# Patient Record
Sex: MALE | Race: BLACK OR AFRICAN AMERICAN | Employment: FULL TIME | URBAN - METROPOLITAN AREA
[De-identification: names, ages, dates, MRNs, and addresses within clinical notes are randomized per-mention and may not be internally consistent; named-entity substitution may affect disease eponyms.]

---

## 2017-01-04 ENCOUNTER — OFFICE VISIT (OUTPATIENT)
Dept: INTERNAL MEDICINE CLINIC | Age: 37
End: 2017-01-04

## 2017-01-04 VITALS
BODY MASS INDEX: 45.67 KG/M2 | DIASTOLIC BLOOD PRESSURE: 64 MMHG | SYSTOLIC BLOOD PRESSURE: 122 MMHG | HEIGHT: 67 IN | WEIGHT: 291 LBS | HEART RATE: 84 BPM | OXYGEN SATURATION: 96 % | RESPIRATION RATE: 20 BRPM

## 2017-01-04 DIAGNOSIS — G89.29 CHRONIC NONINTRACTABLE HEADACHE, UNSPECIFIED HEADACHE TYPE: Primary | ICD-10-CM

## 2017-01-04 DIAGNOSIS — R51.9 CHRONIC NONINTRACTABLE HEADACHE, UNSPECIFIED HEADACHE TYPE: Primary | ICD-10-CM

## 2017-01-04 DIAGNOSIS — E66.01 MORBID OBESITY DUE TO EXCESS CALORIES (HCC): ICD-10-CM

## 2017-01-04 DIAGNOSIS — E78.2 MIXED HYPERLIPIDEMIA: ICD-10-CM

## 2017-01-04 RX ORDER — TOPIRAMATE 50 MG/1
50 TABLET, FILM COATED ORAL 2 TIMES DAILY
Qty: 60 TAB | Refills: 0 | Status: SHIPPED | OUTPATIENT
Start: 2017-01-04 | End: 2017-02-03

## 2017-01-04 RX ORDER — PHENTERMINE HYDROCHLORIDE 37.5 MG/1
37.5 TABLET ORAL
Qty: 30 TAB | Refills: 0 | Status: SHIPPED | OUTPATIENT
Start: 2017-01-04 | End: 2017-02-03

## 2017-01-04 NOTE — PROGRESS NOTES
Chief Complaint   Patient presents with    Medication Refill     was started on phentermine for 30 days in sept and didn't know had to come in for refill. 1. Have you been to the ER, urgent care clinic since your last visit? Hospitalized since your last visit? No    2. Have you seen or consulted any other health care providers outside of the [address] [place] [address] since your last visit? Include any pap smears or colon screening.  No

## 2017-01-04 NOTE — MR AVS SNAPSHOT
Visit Information Date & Time Provider Department Dept. Phone Encounter #  
 1/4/2017 10:45 AM Cedric Elizabeth MD [facility] Medicine [phone] [id number] Upcoming Health Maintenance Date Due INFLUENZA AGE 9 TO ADULT 8/1/2016 DTaP/Tdap/Td series (3 - Td) 4/12/2021 Allergies as of 1/4/2017  Review Complete On: 1/4/2017 By: Cedric Elizabeth MD  
  
 Severity Noted Reaction Type Reactions Sulfa (Sulfonamide Antibiotics)  11/11/2009    Nausea and Vomiting Current Immunizations  Reviewed on 4/12/2011 Name Date  
 TD Vaccine 1/1/1998 TDAP Vaccine 4/12/2011 Not reviewed this visit You Were Diagnosed With   
  
 Codes Comments Chronic nonintractable headache, unspecified headache type    -  Primary ICD-10-CM: R51 ICD-9-CM: 784.0 Morbid obesity due to excess calories (HCC)     ICD-10-CM: E66.01 
ICD-9-CM: 278.01 Mixed hyperlipidemia     ICD-10-CM: E78.2 ICD-9-CM: 272.2 Vitals BP Pulse Resp Height(growth percentile) Weight(growth percentile) SpO2  
 122/64 84 20 5' 7\" (1.702 m) 291 lb (132 kg) 96% BMI Smoking Status 45.58 kg/m2 Never Smoker BMI and BSA Data Body Mass Index Body Surface Area 45.58 kg/m 2 2.5 m 2 Preferred Pharmacy Pharmacy Name Phone [address], [address] Your Updated Medication List  
  
   
This list is accurate as of: 1/4/17 11:46 AM.  Always use your most recent med list.  
  
  
  
  
 phentermine 37.5 mg tablet Commonly known as:  ADIPEX-P Take 1 Tab by mouth every morning for 30 days. Max Daily Amount: 37.5 mg.  
  
 topiramate 50 mg tablet Commonly known as:  TOPAMAX Take 1 Tab by mouth two (2) times a day for 30 days. Prescriptions Printed  Refills  
 phentermine (ADIPEX-P) 37.5 mg tablet 0  
 Sig: Take 1 Tab by mouth every morning for 30 days. Max Daily Amount: 37.5 mg.  
 Class: Print Route: Oral  
  
Prescriptions Sent to Pharmacy Refills  
 topiramate (TOPAMAX) 50 mg tablet 0 Sig: Take 1 Tab by mouth two (2) times a day for 30 days. Class: Normal  
 Pharmacy: [address] [facility], [address]  #: [phone] Route: Oral  
  
Introducing [facility]! Dear William Phillips: Thank you for requesting a [employer] account. Our records indicate that you already have an active [employer] account. You can access your account anytime at [email or web address]. [employer] Did you know that you can access your hospital and ER discharge instructions at any time in [employer]? You can also review all of your test results from your hospital stay or ER visit. Additional Information If you have questions, please visit the Frequently Asked Questions section of the [employer] website at [email or web address] Remember, [employer] is NOT to be used for urgent needs. For medical emergencies, dial 911. Now available from your iPhone and Android! Please provide this summary of care documentation to your next provider. Your primary care clinician is listed as Alonso Marmolejo. If you have any questions after today's visit, please call [phone].

## 2017-01-04 NOTE — PROGRESS NOTES
Written by Eugene Pardo, as dictated by Dr. Yuniel Helms MD.    Flaco Yoon is a 39 y.o. male. HPI  The patient comes in today for a phentermine follow up. He did not come back for a follow up after he finished the first month of phentermine in September. He lost down to 277 lbs and now he has gained the weight back and weighs 291 lbs. He would like to go back on phentermine. He was supposed to go in for a colonoscopy in September, but he lost the paperwork, so he requests more paperwork. He does not exercise and he is very stressed at work and has long hours. His headaches are better, at this time after we put him on Topamax. His flank pain has improved since his last visit. He is currently on Doxycycline for acne. He denies any abdominal pain or irregular BMs. Patient Active Problem List   Diagnosis Code    Hyperlipemia E78.5    Acne vulgaris L70.0    Depression F32.9    Chronic nonintractable headache R51        No current outpatient prescriptions on file prior to visit. No current facility-administered medications on file prior to visit.         Allergies   Allergen Reactions    Sulfa (Sulfonamide Antibiotics) Nausea and Vomiting       Past Medical History   Diagnosis Date    Mixed hyperlipidemia     Obesity     Persistent disorder of initiating or maintaining sleep        Past Surgical History   Procedure Laterality Date    Hx orthopaedic  '05     rt. 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 HENT: Negative for congestion. Respiratory: Negative for cough and wheezing. Cardiovascular: Negative for chest pain and palpitations. Gastrointestinal: Negative for abdominal pain and heartburn. Musculoskeletal: Negative for joint pain and myalgias. Neurological: Positive for headaches. Negative for weakness. Visit Vitals    /64    Pulse 84    Resp 20    Ht 5' 7\" (1.702 m)    Wt 291 lb (132 kg)    SpO2 96%    BMI 45.58 kg/m2     Physical Exam   Constitutional: He is oriented to person, place, and time. No distress. Obese   HENT:   Right Ear: External ear normal.   Left Ear: External ear normal.   Mouth/Throat: Oropharynx is clear and moist.   Eyes: Conjunctivae and EOM are normal. Right eye exhibits no discharge. Left eye exhibits no discharge. Neck: Normal range of motion. Neck supple. Cardiovascular: Normal rate and regular rhythm. Pulmonary/Chest: Effort normal and breath sounds normal. He has no wheezes. Abdominal: Soft. Bowel sounds are normal. He exhibits no distension. Lymphadenopathy:     He has no cervical adenopathy. Neurological: He is alert and oriented to person, place, and time. Psychiatric: He has a normal mood and affect. Nursing note and vitals reviewed. ASSESSMENT and PLAN    ICD-10-CM ICD-9-CM    1. Chronic nonintractable headache, unspecified headache type R51 784.0 topiramate (TOPAMAX) 50 mg tablet sent to pharmacy. I will send him a refill of his Topamax in order to help prevent his headaches. 2. Morbid obesity due to excess calories ([facility]) E66.01 278.01 phentermine (ADIPEX-P) 37.5 mg tablet script given to patient. I will put her on Phentermine for 1 month since he has already had an EKG on this medication.  I discussed that he must come in at the end of the month in order to gauge how his weight loss is working. I discussed he can only take this medication for 3 months. 3. Mixed hyperlipidemia E78.2 272.2 He is not on any oral statins at this time. Trying to loose weight & bring levels down with diet. This plan was reviewed with the patient and patient agrees. All questions were answered. This scribe documentation was reviewed by me and accurately reflects the examination and decisions made by me. This note will not be viewable in [address]

## 2017-04-27 ENCOUNTER — PATIENT OUTREACH (OUTPATIENT)
Dept: INTERNAL MEDICINE CLINIC | Age: 37
End: 2017-04-27

## 2017-07-20 ENCOUNTER — HOSPITAL ENCOUNTER (EMERGENCY)
Age: 37
Discharge: HOME | End: 2017-07-20
Attending: FAMILY MEDICINE

## 2017-07-20 ENCOUNTER — APPOINTMENT (OUTPATIENT)
Dept: GENERAL RADIOLOGY | Age: 37
End: 2017-07-20
Attending: FAMILY MEDICINE

## 2017-07-20 VITALS
HEIGHT: 67 IN | OXYGEN SATURATION: 95 % | SYSTOLIC BLOOD PRESSURE: 136 MMHG | WEIGHT: 291 LBS | HEART RATE: 82 BPM | RESPIRATION RATE: 16 BRPM | DIASTOLIC BLOOD PRESSURE: 86 MMHG | TEMPERATURE: 97.8 F | BODY MASS INDEX: 45.67 KG/M2

## 2017-07-20 DIAGNOSIS — S93.601A FOOT SPRAIN, RIGHT, INITIAL ENCOUNTER: Primary | ICD-10-CM

## 2017-07-20 RX ORDER — HYDROCODONE BITARTRATE AND ACETAMINOPHEN 7.5; 325 MG/1; MG/1
1 TABLET ORAL
Qty: 15 TAB | Refills: 0 | Status: SHIPPED | OUTPATIENT
Start: 2017-07-20 | End: 2017-10-27 | Stop reason: ALTCHOICE

## 2017-07-20 RX ORDER — IBUPROFEN 800 MG/1
800 TABLET ORAL
Qty: 20 TAB | Refills: 0 | Status: SHIPPED | OUTPATIENT
Start: 2017-07-20 | End: 2017-07-27

## 2017-07-20 NOTE — UC PROVIDER NOTE
Patient is a 40 y.o. male presenting with foot pain. The history is provided by the patient. Foot Pain    This is a new problem. The current episode started 3 to 5 hours ago. The problem occurs constantly. The problem has been gradually worsening. The pain is present in the right foot and right ankle. The quality of the pain is described as constant. The pain is at a severity of 9/10. The pain is severe. Associated symptoms include limited range of motion and stiffness. The symptoms are aggravated by standing, movement, palpation and contact. He has tried nothing for the symptoms. There has been a history of trauma (twisted ankle walking straighht this am ). Past Medical History:   Diagnosis Date    Mixed hyperlipidemia     Obesity     Persistent disorder of initiating or maintaining sleep         Past Surgical History:   Procedure Laterality Date    HX ORTHOPAEDIC  '05    rt. 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 All other systems reviewed and are negative. Vitals:    07/20/17 1403   BP: 136/86   Pulse: 82   Resp: 16   Temp: 97.8 °F (36.6 °C)   SpO2: 95%   Weight: 132 kg (291 lb)   Height: 5' 7\" (1.702 m)       Physical Exam   Musculoskeletal:        Right ankle: He exhibits decreased range of motion.  He exhibits no swelling, no ecchymosis, no deformity and no laceration. No tenderness. Right foot: There is decreased range of motion, tenderness, bony tenderness (on 5th MT bone) and swelling. Feet:    Nursing note and vitals reviewed. MDM     Differential Diagnosis; Clinical Impression; Plan:     CLINICAL IMPRESSION:  Foot sprain, right, initial encounter  (primary encounter diagnosis)      DDX    Plan:    Xray- no fracture  No weight bearing x 3-4 days and then advance to partial weight bearing as tolerated. Rest- ice- compression and elevation    Norco for pain and follow with ortho if pain not better. Amount and/or Complexity of Data Reviewed:   Tests in the radiology section of CPT®:  Ordered and reviewed   Review and summarize past medical records:  Yes  Risk of Significant Complications, Morbidity, and/or Mortality:   Presenting problems: Moderate  Diagnostic procedures: Moderate  Management options:   Moderate  Progress:   Patient progress:  Stable      Procedures

## 2017-07-20 NOTE — DISCHARGE INSTRUCTIONS
Foot Sprain: Care Instructions  Your Care Instructions    A foot sprain occurs when you stretch or tear the ligaments around your foot. Ligaments are the tough tissues that connect one bone to another. A sprain can happen when you run, fall, or hit your toe against something. Sprains often happen when you jump or change direction quickly. This may occur when you play basketball, soccer, or other sports. Most foot sprains will get better with treatment at home. Follow-up care is a key part of your treatment and safety. Be sure to make and go to all appointments, and call your doctor if you are having problems. It's also a good idea to know your test results and keep a list of the medicines you take. How can you care for yourself at home? · Walk or put weight on your sprained foot as long as it does not hurt. · If your doctor gave you a splint or immobilizer, wear it as directed. If you were given crutches, use them as directed. · For the first 2 days after your injury, avoid hot showers, hot tubs, or hot packs. They may increase swelling. · Put ice or a cold pack on your foot for 10 to 20 minutes at a time to stop swelling. Try this every 1 to 2 hours for 3 days (when you are awake) or until the swelling goes down. Put a thin cloth between the ice pack and your skin. Keep your splint dry. · After 2 or 3 days, if your swelling is gone, put a heating pad (set on low) or a warm cloth on your foot. Some doctors suggest that you go back and forth between hot and cold treatments. · Prop up your foot on a pillow when you ice it or anytime you sit or lie down. Try to keep it above the level of your heart. This will help reduce swelling. · Take pain medicines exactly as directed. ¨ If the doctor gave you a prescription medicine for pain, take it as prescribed. ¨ If you are not taking a prescription pain medicine, ask your doctor if you can take an over-the-counter medicine.   · Do any exercises that your doctor or physical therapist suggests. · Return to your usual exercise gradually as you feel better. When should you call for help? Call your doctor now or seek immediate medical care if:  · You have increased or severe pain. · Your toes are cool or pale or change color. · Your wrap or splint feels too tight. · You have signs of a blood clot, such as:  ¨ Pain in your calf, back of the knee, thigh, or groin. ¨ Redness and swelling in your leg or groin. · You have tingling, weakness, or numbness in your leg or foot. Watch closely for changes in your health, and be sure to contact your doctor if:  · You cannot put any weight on your foot. · You get a fever. · You do not get better as expected. Where can you learn more? Go to http://barbara-nataly.info/. Enter J013 in the search box to learn more about \"Foot Sprain: Care Instructions. \"  Current as of: March 21, 2017  Content Version: 11.3  © 5141-4537 [employer]. Care instructions adapted under license by [employer] (which disclaims liability or warranty for this information). If you have questions about a medical condition or this instruction, always ask your healthcare professional. [address] any warranty or liability for your use of this information. Arch Pain: Exercises  Your Care Instructions  Here are some examples of typical rehabilitation exercises for your condition. Start each exercise slowly. Ease off the exercise if you start to have pain. Your doctor or physical therapist will tell you when you can start these exercises and which ones will work best for you. How to do the exercises  Plantar fascia stretch    1. Sit in a chair and put your affected foot on your other knee. 2. Hold the heel of your foot in one hand, and grasp your toes with the other hand. 3. Pull on your heel (toward your body), and at the same time pull your toes back with your other hand.   4. You should feel a stretch along the bottom of your foot. 5. Hold 15 to 30 seconds. 6. Repeat 2 to 4 times. Plantar fascia stretch (kneeling)    Note: You may want to place a pillow under your knees for this exercise. 1. Get on your hands and knees on the floor. Keep your heels pointing up and the balls of your feet and your toes on the floor. 2. Slowly sit back toward your ankles. 3. If this is too hard, you can try doing it one leg at a time. Stand up, and then kneel on one knee and keep the other leg forward. Place the foot of your forward leg flat on the ground and bend that knee. The heel on the leg still behind you should point up. The ball and toes of that foot should be on the floor. Sit back toward that ankle. 4. Hold 15 to 30 seconds. 5. Repeat 2 to 4 times. Switch legs if you are doing this one leg at a time. Plantar fascia self-massage    1. Sit in a chair. 2. Place your affected foot on a firm, tube-shaped object, such as a can or water bottle. 3. Roll your foot back and forth over the object to massage the bottom of your foot. 4. If you want to do ice massage, fill a water bottle about three-fourths of the way full and freeze before using. 5. Continue for 2 to 5 minutes. Bilateral calf stretch (knees straight)    1. Place a book on the floor a few inches from a wall or countertop, and put the balls of your feet on it. Your heels should be on the floor. The book needs to be thick enough so that you can feel a gentle stretch in each calf. If you are not steady on your feet, hold on to a chair, counter, or wall while you do this stretch. 2. Keep your knees straight, and lean forward until you feel a stretch in each calf. 3. To get more stretch, add another book or use a thicker book, such as a phone book, a dictionary, or an encyclopedia. 4. Hold the stretch for at least 15 to 30 seconds. 5. Repeat 2 to 4 times. Bilateral calf stretch (knees bent)    1.  Place a book on the floor a few inches from a wall or countertop, and put the balls of your feet on it. Your heels should be on the floor. The book needs to be thick enough so that you can feel a gentle stretch in each calf. If you are not steady on your feet, hold on to a chair, counter, or wall while you do this stretch. 2. Bend your knees, and lean forward until you feel a stretch in each calf. 3. To get more stretch, add another book or use a thicker book, such as a phone book, a dictionary, or an encyclopedia. 4. Hold the stretch for at least 15 to 30 seconds. 5. Repeat 2 to 4 times. [place] pick-ups    1. Put some marbles on the floor next to a cup.  2. Sit down, and use the toes of your affected foot to lift up one marble from the floor at a time. Then try to put the marble in the cup.  3. Repeat 8 to 12 times. Towel scrunches    1. Sit down, and place your affected foot on a towel on the floor. You may also do this with both feet on the towel. 2. Scrunch the towel toward you with your toes. Then use your toes to push the towel back into place. 3. Repeat 8 to 12 times. Heel raises on a step    1. Stand on the bottom step of a staircase, facing up toward the stairs. Put the balls of your feet on the step. If you are not steady on your feet, hold on to the banister or wall. 2. Keeping both knees straight, slowly lift your heels above the step so that you are standing on your toes. Then slowly lower your heels below the step and toward the floor. 3. Return to the starting position, with your feet even with the step. 4. Repeat 8 to 12 times. Follow-up care is a key part of your treatment and safety. Be sure to make and go to all appointments, and call your doctor if you are having problems. It's also a good idea to know your test results and keep a list of the medicines you take. Where can you learn more? Go to http://barbara-nataly.info/.   Enter H119 in the search box to learn more about \"Arch Pain: Exercises. \"  Current as of: March 21, 2017  Content Version: 11.3  © 1623-6357 [employer]. Care instructions adapted under license by [employer] (which disclaims liability or warranty for this information). If you have questions about a medical condition or this instruction, always ask your healthcare professional. [place] [address] any warranty or liability for your use of this information.

## 2017-07-21 ENCOUNTER — OFFICE VISIT (OUTPATIENT)
Dept: INTERNAL MEDICINE CLINIC | Age: 37
End: 2017-07-21

## 2017-07-21 VITALS
BODY MASS INDEX: 45.58 KG/M2 | TEMPERATURE: 98.5 F | DIASTOLIC BLOOD PRESSURE: 80 MMHG | HEART RATE: 100 BPM | SYSTOLIC BLOOD PRESSURE: 136 MMHG | HEIGHT: 67 IN | OXYGEN SATURATION: 97 % | RESPIRATION RATE: 16 BRPM

## 2017-07-21 DIAGNOSIS — S93.401D SPRAIN OF RIGHT ANKLE, UNSPECIFIED LIGAMENT, SUBSEQUENT ENCOUNTER: Primary | ICD-10-CM

## 2017-07-21 DIAGNOSIS — R40.0 DAYTIME SLEEPINESS: ICD-10-CM

## 2017-07-21 DIAGNOSIS — E66.01 MORBID OBESITY DUE TO EXCESS CALORIES (HCC): ICD-10-CM

## 2017-07-21 DIAGNOSIS — R06.83 SNORES: ICD-10-CM

## 2017-07-21 NOTE — PROGRESS NOTES
Written by Jonna Curry, as dictated by Dr. Jessica Gonzáles MD.    Alo Wood is a 40 y.o. male. HPI  The patient comes in today for follow up from   and insomnia. He wakes up choking/gasping for air. The GI who did his colonoscopy recommended he get a sleep study done. He does not feel rested in the morning and feels sleepy throughout the day. He snores at night. He has also been having trouble with his memory, which is affecting his work. He is in a wheelchair today, as he recently fell and sprained his R ankle. He visited  yesterday 07/20 and was told no weight-bearing for 3-4 days, then progress to partial weight-bearing, rest/ice/compression/elevation. He was given Norco and ibuprofen, which he started taking yesterday and feels like the ibuprofen is helping more. He got a colonoscopy done due to fhx of colon cancer, which was normal and he was told to return in 3 years. He has gained weight since 02/2017. Patient Active Problem List   Diagnosis Code    Hyperlipemia E78.5    Acne vulgaris L70.0    Depression F32.9    Chronic nonintractable headache R51        Current Outpatient Prescriptions on File Prior to Visit   Medication Sig Dispense Refill    HYDROcodone-acetaminophen (NORCO) 7.5-325 mg per tablet Take 1 Tab by mouth every six (6) hours as needed for Pain. Max Daily Amount: 4 Tabs. 15 Tab 0    ibuprofen (MOTRIN) 800 mg tablet Take 1 Tab by mouth every eight (8) hours as needed for Pain for up to 7 days. 20 Tab 0     No current facility-administered medications on file prior to visit.         Allergies   Allergen Reactions    Sulfa (Sulfonamide Antibiotics) Nausea and Vomiting       Past Medical History:   Diagnosis Date    Mixed hyperlipidemia     Obesity     Persistent disorder of initiating or maintaining sleep        Past Surgical History:   Procedure Laterality Date    HX ORTHOPAEDIC  '05    rt. 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 Latest known visit with results is:    Hospital Outpatient Visit on 09/08/2016   Component Date Value Ref Range Status    Special Requests: 09/08/2016 NO SPECIAL REQUESTS    Final    [place] Count 09/08/2016 <1,000 COLONIES/mL    Final    Culture result: 09/08/2016 NO GROWTH 2 DAYS    Final       Review of Systems   Constitutional: Negative for malaise/fatigue. HENT: Negative for congestion. Respiratory: Negative for cough and shortness of breath. Musculoskeletal: Positive for joint pain. Negative for myalgias. Neurological: Negative for weakness and headaches. Psychiatric/Behavioral: Positive for memory loss. Negative for depression and substance abuse. The patient has insomnia. Visit Vitals    /80 (BP 1 Location: Right arm, BP Patient Position: Sitting)    Pulse 100    Temp 98.5 °F (36.9 °C) (Oral)    Resp 16    Ht 5' 7\" (1.702 m)    SpO2 97%    BMI 45.58 kg/m2       Physical Exam   Constitutional: He is oriented to person, place, and time. He appears well-developed. No distress. Morbidly obese   HENT:   Right Ear: External ear normal.   Left Ear: External ear normal.   Eyes: Conjunctivae and EOM are normal.   Neck: Normal range of motion. Neck supple. Cardiovascular: Normal rate and regular rhythm.     Pulmonary/Chest: Effort normal and breath sounds normal. He has no wheezes. Abdominal: Soft. Bowel sounds are normal. He exhibits distension. Musculoskeletal: He exhibits edema. ACE bandage on R ankle. ROM limited. Neurological: He is alert and oriented to person, place, and time. Skin: He is not diaphoretic. Psychiatric: He has a normal mood and affect. His behavior is normal.   Nursing note and vitals reviewed. ASSESSMENT and PLAN    ICD-10-CM ICD-9-CM    1. Sprain of right ankle, unspecified ligament, subsequent encounter S93.401D 845.00 Pt visited  on 07/20 and was given medications and a recovery plan. I discussed he should take ibuprofen with food and only take Norco for severe pain, as it can cause constipation. 2. Morbid obesity due to excess calories ([facility] [address] E66.01 278.01 We will discuss weight loss further after he has recovered from his sprain. 3. Snores R06.83 786.09 REFERRAL TO SLEEP STUDIES   4. Daytime sleepiness R40.0 780.54 REFERRAL TO SLEEP STUDIES    I want him to get a sleep study done. I discussed that his memory problems may be a result of chronic snoring. This plan was reviewed with the patient and patient agrees. All questions were answered. This scribe documentation was reviewed by me and accurately reflects the examination and decisions made by me. This note will not be viewable in [address]

## 2017-07-21 NOTE — PROGRESS NOTES
Chief Complaint   Patient presents with    Anxiety    Sleep Problem       1. Have you been to the ER, urgent care clinic since your last visit? Hospitalized since your last visit? No    2. Have you seen or consulted any other health care providers outside of the [address] [place] [address] since your last visit? Include any pap smears or colon screening. No    Body mass index is 45.58 kg/(m^2).

## 2017-07-26 ENCOUNTER — OFFICE VISIT (OUTPATIENT)
Dept: INTERNAL MEDICINE CLINIC | Age: 37
End: 2017-07-26

## 2017-07-26 VITALS
HEART RATE: 84 BPM | HEIGHT: 67 IN | TEMPERATURE: 98.3 F | SYSTOLIC BLOOD PRESSURE: 126 MMHG | OXYGEN SATURATION: 97 % | RESPIRATION RATE: 16 BRPM | WEIGHT: 300 LBS | BODY MASS INDEX: 47.09 KG/M2 | DIASTOLIC BLOOD PRESSURE: 86 MMHG

## 2017-07-26 DIAGNOSIS — F98.8 ADD (ATTENTION DEFICIT DISORDER): Primary | ICD-10-CM

## 2017-07-26 DIAGNOSIS — F41.8 DEPRESSION WITH ANXIETY: ICD-10-CM

## 2017-07-26 RX ORDER — DEXTROAMPHETAMINE SACCHARATE, AMPHETAMINE ASPARTATE, DEXTROAMPHETAMINE SULFATE AND AMPHETAMINE SULFATE 2.5; 2.5; 2.5; 2.5 MG/1; MG/1; MG/1; MG/1
10 TABLET ORAL DAILY
Qty: 30 TAB | Refills: 0 | Status: SHIPPED | OUTPATIENT
Start: 2017-07-26 | End: 2017-08-29 | Stop reason: SDUPTHER

## 2017-07-26 NOTE — MR AVS SNAPSHOT
Visit Information Date & Time Provider Department Dept. Phone Encounter #  
 7/26/2017 12:00 PM Edmond Weir, [address] [facility],[address] Internal Medicine [phone] [id number] Your Appointments 8/22/2017  3:20 PM  
New Patient with Isabela Ashley MD  
[address] [facility] ([facility]) Appt Note: New patient refd by Edmond Weir MD-Please evaluate patient for sleep apnea [address]  
  
   
 [address] [address] Upcoming Health Maintenance Date Due INFLUENZA AGE 9 TO ADULT 8/1/2017 DTaP/Tdap/Td series (3 - Td) 4/12/2021 Allergies as of 7/26/2017  Review Complete On: 7/26/2017 By: Edmond Weir MD  
  
 Severity Noted Reaction Type Reactions Sulfa (Sulfonamide Antibiotics)  11/11/2009    Nausea and Vomiting Current Immunizations  Reviewed on 4/12/2011 Name Date  
 TD Vaccine 1/1/1998 TDAP Vaccine 4/12/2011 Not reviewed this visit You Were Diagnosed With   
  
 Codes Comments ADD (attention deficit disorder)    -  Primary ICD-10-CM: F98.8 ICD-9-CM: 314.00 Depression with anxiety     ICD-10-CM: F41.8 ICD-9-CM: 300.4 Vitals BP Pulse Temp Resp Height(growth percentile) Weight(growth percentile) 126/86 (BP 1 Location: Left arm, BP Patient Position: Sitting) 84 98.3 °F (36.8 °C) (Oral) 16 5' 7\" (1.702 m) 300 lb (136.1 kg) SpO2 BMI Smoking Status 97% 46.99 kg/m2 Never Smoker Vitals History BMI and BSA Data Body Mass Index Body Surface Area  
 46.99 kg/m 2 2.54 m 2 Preferred Pharmacy Pharmacy Name Phone [address], [address] Your Updated Medication List  
  
   
This list is accurate as of: 7/26/17 12:48 PM.  Always use your most recent med list.  
  
  
  
  
 dextroamphetamine-amphetamine 10 mg tablet Commonly known as:  ADDERALL Take 1 Tab (10 mg total) by mouth daily. Max Daily Amount: 10 mg HYDROcodone-acetaminophen 7.5-325 mg per tablet Commonly known as:  Puryear Camilo Take 1 Tab by mouth every six (6) hours as needed for Pain. Max Daily Amount: 4 Tabs. ibuprofen 800 mg tablet Commonly known as:  MOTRIN Take 1 Tab by mouth every eight (8) hours as needed for Pain for up to 7 days. Prescriptions Printed Refills  
 dextroamphetamine-amphetamine (ADDERALL) 10 mg tablet 0 Sig: Take 1 Tab (10 mg total) by mouth daily. Max Daily Amount: 10 mg  
 Class: Print Route: Oral  
  
We Performed the Following REFERRAL TO PSYCHOLOGY [NAU17 Custom] Comments:  
 Please evaluate patient for ADD/anxiety Referral Information Referral ID Referred By Referred To  
  
 [id number] Samuel CHOE6, PHD   
   [address] [place] [address] Phone: [phone] Fax: [phone] Visits Status Start Date End Date 1 New Request 7/26/17 7/26/18 If your referral has a status of pending review or denied, additional information will be sent to support the outcome of this decision. Introducing hospitals & HEALTH SERVICES! Dear Philipp Clay: Thank you for requesting a [employer] account. Our records indicate that you already have an active [employer] account. You can access your account anytime at [email or web address] Did you know that you can access your hospital and ER discharge instructions at any time in [employer]? You can also review all of your test results from your hospital stay or ER visit. Additional Information If you have questions, please visit the Frequently Asked Questions section of the [employer] website at [email or web address] Remember, [employer] is NOT to be used for urgent needs. For medical emergencies, dial 911. Now available from your iPhone and Android! Please provide this summary of care documentation to your next provider. Your primary care clinician is listed as James Calvo. If you have any questions after today's visit, please call [phone].

## 2017-07-26 NOTE — PROGRESS NOTES
Written by Celia Coronado, as dictated by Dr. Magi Reyes MD.    Yolie Nicole is a 40 y.o. male. HPI  The patient comes in today c/o sleep problem. He has been noticing he has difficulty prioritizing things, multitasking, and staying organized, which he first noticed after college. He has also been feeling depressed and anxious lately, as he does not like being around people or speak often. No suicidal ideation, never been on any anxiety or depression medications. Patient Active Problem List   Diagnosis Code    Hyperlipemia E78.5    Acne vulgaris L70.0    Depression F32.9    Chronic nonintractable headache R51        Current Outpatient Prescriptions on File Prior to Visit   Medication Sig Dispense Refill    HYDROcodone-acetaminophen (NORCO) 7.5-325 mg per tablet Take 1 Tab by mouth every six (6) hours as needed for Pain. Max Daily Amount: 4 Tabs. 15 Tab 0    ibuprofen (MOTRIN) 800 mg tablet Take 1 Tab by mouth every eight (8) hours as needed for Pain for up to 7 days. 20 Tab 0     No current facility-administered medications on file prior to visit.         Allergies   Allergen Reactions    Sulfa (Sulfonamide Antibiotics) Nausea and Vomiting       Past Medical History:   Diagnosis Date    Mixed hyperlipidemia     Obesity     Persistent disorder of initiating or maintaining sleep        Past Surgical History:   Procedure Laterality Date    HX ORTHOPAEDIC  '05    rt. shoulder - \"slap\" injury       Family History   Problem Relation Age of Onset    Cancer Father      colon    Asthma Sister        Social History     Social History    Marital status: SINGLE     Spouse name: Single    Number of children: 0    Years of education: N/A     Occupational History    IT      Social History Main Topics    Smoking status: Never Smoker    Smokeless tobacco: Never Used    Alcohol use 1.5 oz/week     3 Standard drinks or equivalent per week    Drug use: No    Sexual activity: Yes     Partners: Female     Other Topics Concern    Not on file     Social History Narrative           Review of Systems   Constitutional: Negative for malaise/fatigue. HENT: Negative for congestion. Respiratory: Negative for cough and shortness of breath. Musculoskeletal: Negative for joint pain and myalgias. Neurological: Negative for weakness and headaches. Psychiatric/Behavioral: Positive for depression. Negative for memory loss and substance abuse. The patient is nervous/anxious and has insomnia. Visit Vitals    /86 (BP 1 Location: Left arm, BP Patient Position: Sitting)    Pulse 84    Temp 98.3 °F (36.8 °C) (Oral)    Resp 16    Ht 5' 7\" (1.702 m)    Wt 300 lb (136.1 kg)    SpO2 97%    BMI 46.99 kg/m2       Physical Exam   Constitutional: He is oriented to person, place, and time. He appears well-developed. Morbidly obese   HENT:   Right Ear: External ear normal.   Left Ear: External ear normal.   Eyes: Conjunctivae and EOM are normal.   Neck: Normal range of motion. Neck supple. Cardiovascular: Normal rate and regular rhythm. Pulmonary/Chest: Effort normal and breath sounds normal.   Abdominal: Soft. Bowel sounds are normal. There is no tenderness. Neurological: He is alert and oriented to person, place, and time. Psychiatric:   Mood normal   Affect flat. Nursing note and vitals reviewed. ASSESSMENT and PLAN    ICD-10-CM ICD-9-CM    1. ADD (attention deficit disorder) F98.8 314.00 REFERRAL TO PSYCHOLOGY      dextroamphetamine-amphetamine (ADDERALL) 10 mg tablet script given to pt   2. Depression with anxiety F41.8 300.4 REFERRAL TO PSYCHOLOGY    I want him to start on Adderall after breakfast and monitor how he feels all day. He should avoid taking another pill in the afternoon as it may affect his sleep. I will refer him for ADD testing to determine if the root cause of his psychological issues is related to depression or anxiety.  He should stop taking Adderall 2-3 days before he is scheduled to be tested. This plan was reviewed with the patient and patient agrees. All questions were answered. This scribe documentation was reviewed by me and accurately reflects the examination and decisions made by me. This note will not be viewable in [address]

## 2017-07-26 NOTE — PROGRESS NOTES
Patient's identity verified with two patient identifiers (name and date of birth). 1. Have you been to the ER, urgent care clinic since your last visit? Hospitalized since your last visit? No    2. Have you seen or consulted any other health care providers outside of the [address] [place] [address] since your last visit? Include any pap smears or colon screening. No    Chief Complaint   Patient presents with    Sleep Problem     Presents for Rx and ADD study, isn't scheduled for consult for sleep study until 8/22/17, needs help with concentration. Not fasting.

## 2017-08-22 ENCOUNTER — OFFICE VISIT (OUTPATIENT)
Dept: SLEEP MEDICINE | Age: 37
End: 2017-08-22

## 2017-08-22 VITALS
WEIGHT: 295.7 LBS | DIASTOLIC BLOOD PRESSURE: 88 MMHG | HEART RATE: 102 BPM | SYSTOLIC BLOOD PRESSURE: 128 MMHG | OXYGEN SATURATION: 96 % | HEIGHT: 67 IN | BODY MASS INDEX: 46.41 KG/M2

## 2017-08-22 DIAGNOSIS — Z86.59 H/O: DEPRESSION: ICD-10-CM

## 2017-08-22 DIAGNOSIS — G47.33 OSA (OBSTRUCTIVE SLEEP APNEA): Primary | ICD-10-CM

## 2017-08-22 NOTE — PROGRESS NOTES
[address] [facility] Jocelyne Burrows [address], [address]  Tel.  [phone]  Fax. [address] [facility]  [place], [address] [facility]  Tel.  [phone]  Fax. [phone] [address]  Tel.  [phone]  Fax. [phone]         Subjective:      Mercedes Giron is an 40 y.o. male referred for evaluation for a sleep disorder. He complains of snoring associated with periods of not breathing. Symptoms began several years ago, unchanged since that time. He usually can fall asleep in 30 minutes. Family or house members note snoring and periods of not breathing. He denies completely or partially paralyzed while falling asleep or waking up. Mercedes Giron does wake up frequently at night. He is bothered by waking up too early and left unable to get back to sleep. He actually sleeps about 5 hours at night and wakes up about 3 times during the night. He does work shifts:  First Shift. Immanuel Carrillo indicates he does get too little sleep at night. His bedtime is 2200. He awakens at 0430. He does take naps. He takes 6 naps a week lasting 2, Hour(s). He has the following observed behaviors: Loud snoring, Light snoring, Pauses in breathing;  . Other remarks:   He denies of an urge to move extremities due to discomfort or other sensation and denies of dream enactment behavior. Bulan Sleepiness Score: 10 which reflect moderate daytime drowsiness. Allergies   Allergen Reactions    Sulfa (Sulfonamide Antibiotics) Nausea and Vomiting         Current Outpatient Prescriptions:     dextroamphetamine-amphetamine (ADDERALL) 10 mg tablet, Take 1 Tab (10 mg total) by mouth daily. Max Daily Amount: 10 mg, Disp: 30 Tab, Rfl: 0    HYDROcodone-acetaminophen (NORCO) 7.5-325 mg per tablet, Take 1 Tab by mouth every six (6) hours as needed for Pain.  Max Daily Amount: 4 Tabs., Disp: 15 Tab, Rfl: 0     He  has a past medical history of Mixed hyperlipidemia; Obesity; and Persistent disorder of initiating or maintaining sleep. He  has a past surgical history that includes orthopaedic ('05). He family history includes Asthma in his sister; Cancer in his father. He  reports that he has never smoked. He has never used smokeless tobacco. He reports that he drinks about 1.5 oz of alcohol per week  He reports that he does not use illicit drugs. Review of Systems:  Constitutional: significant weight gain - since 2004  Eyes:  No blurred vision  CVS:  No significant chest pain  Pulm:  No significant shortness of breath  GI:  No significant nausea or vomiting  :  significant nocturia  Musculoskeletal:  No significant joint pain at night  Skin:  No significant rashes  Neuro:  No significant dizziness   Psych: active mood issues - planning to see someone in October. Sleep Review of Systems: notable for difficulty falling asleep; frequent awakenings at night;  regular dreaming noted; no nightmares ; occasional early morning headaches; memory problems; concentration issues; no history of any automobile or occupational accidents due to daytime drowsiness. Objective:     Visit Vitals    /88    Pulse (!) 102    Ht 5' 7\" (1.702 m)    Wt 295 lb 11.2 oz (134.1 kg)    SpO2 96%    BMI 46.31 kg/m2         General:   Not in acute distress   Eyes:  Anicteric sclerae, no obvious strabismus   Nose:  No obvious nasal septum deviation    Oropharynx:   Class 4 oropharyngeal outlet, thick tongue base, uvula could not be seen due to low-lying soft palate, narrow tonsilo-pharyngeal pilars   Tonsils:   tonsils are not seen due to low-lying soft palate   Neck:   Neck circ.  in \"inches\": 18; midline trachea   Chest/Lungs:  Equal lung expansion, clear on auscultation    CVS:  Normal rate, regular rhythm; no JVD   Skin:  Warm to touch; no obvious rashes   Neuro:  No focal deficits ; no obvious tremor    Psych:  Normal affect,  normal countenance;          Assessment:       ICD-10-CM ICD-9-CM    1. LASHAUN (obstructive sleep apnea) G47.33 327.23 SLEEP STUDY UNATTENDED, 4 CHANNEL   2. BMI 45.0-49.9, adult (HCC) Z68.42 V85.42    3. H/O: depression Z86.59 V11.8          Plan:     * The patient currently has a High Risk for having sleep apnea. STOP-BANG score 6.  * Sleep testing was ordered for initial evaluation. * He was provided information on sleep apnea including coresponding risk factors and the importance of proper treatment. * Treatment options if indicated were reviewed today. Patient agrees to a trial of PAP therapy if indicated. * Counseling was provided regarding proper sleep hygiene (including effect of light on sleep), paradoxical intention, stimulus control, sleep environment safety and safe driving. * Effect of sleep disturbance on weight was reviewed. We have recommended a dedicated weight loss through appropriate diet and an exercise regiment as significant weight reduction has been shown to reduce severity of obstructive sleep apnea. * Patient agrees to telephone [phone]  follow-up by myself of associate shortly after sleep study to review results and plan final management.     (patient has given permission for a message to be left regarding test results and further management if patient cannot be cannot be reached directly). Thank you for allowing us to participate in your patient's medical care. We'll keep you updated on these investigations. Angy Peters MD, FAASM  Electronically signed.  08/22/17

## 2017-08-22 NOTE — PATIENT INSTRUCTIONS
217 [facility] [place]. [place], [address]  Tel.  [phone]  Fax. 100 [facility] 60  [place], [address] [facility]  Tel.  [phone]  Fax. [phone] [facility] [place] [address] [address]  Tel.  [phone]  Fax. [phone]     Sleep Apnea: After Your Visit  Your Care Instructions  Sleep apnea occurs when you frequently stop breathing for 10 seconds or longer during sleep. It can be mild to severe, based on the number of times per hour that you stop breathing or have slowed breathing. Blocked or narrowed airways in your nose, mouth, or throat can cause sleep apnea. Your airway can become blocked when your throat muscles and tongue relax during sleep. Sleep apnea is common, occurring in 1 out of 20 individuals. Individuals having any of the following characteristics should be evaluated and treated right away due to high risk and detrimental consequences from untreated sleep apnea:  1. Obesity  2. Congestive Heart failure  3. Atrial Fibrillation  4. Uncontrolled Hypertension  5. Type II Diabetes  6. Night-time Arrhythmias  7. Stroke  8. Pulmonary Hypertension  9. High-risk Driving Populations (pilots, truck drivers, etc.)  10. Patients Considering Weight-loss Surgery    How do you know you have sleep apnea? You probably have sleep apnea if you answer 'yes' to 3 or more of the following questions:  S - Have you been told that you Snore? T - Are you often Tired during the day? O - Has anyone Observed you stop breathing while sleeping? P- Do you have (or are being treated for) high blood Pressure? B - Are you obese (Body Mass Index > 35)? A - Is your Age 48years old or older? N - Is your Neck size greater than 16 inches? G - Are you male Gender? A sleep physician can prescribe a breathing device that prevents tissues in the throat from blocking your airway.  Or your doctor may recommend using a dental device (oral breathing device) to help keep your airway open. In some cases, surgery may be needed to remove enlarged tissues in the throat. Follow-up care is a key part of your treatment and safety. Be sure to make and go to all appointments, and call your doctor if you are having problems. It's also a good idea to know your test results and keep a list of the medicines you take. How can you care for yourself at home? · Lose weight, if needed. It may reduce the number of times you stop breathing or have slowed breathing. · Go to bed at the same time every night. · Sleep on your side. It may stop mild apnea. If you tend to roll onto your back, sew a pocket in the back of your pajama top. Put a tennis ball into the pocket, and stitch the pocket shut. This will help keep you from sleeping on your back. · Avoid alcohol and medicines such as sleeping pills and sedatives before bed. · Do not smoke. Smoking can make sleep apnea worse. If you need help quitting, talk to your doctor about stop-smoking programs and medicines. These can increase your chances of quitting for good. · Prop up the head of your bed 4 to 6 inches by putting bricks under the legs of the bed. · Treat breathing problems, such as a stuffy nose, caused by a cold or allergies. · Use a continuous positive airway pressure (CPAP) breathing machine if lifestyle changes do not help your apnea and your doctor recommends it. The machine keeps your airway from closing when you sleep. · If CPAP does not help you, ask your doctor whether you should try other breathing machines. A bilevel positive airway pressure machine has two types of air pressureâone for breathing in and one for breathing out. Another device raises or lowers air pressure as needed while you breathe. · If your nose feels dry or bleeds when using one of these machines, talk with your doctor about increasing moisture in the air. A humidifier may help.   · If your nose is runny or stuffy from using a breathing machine, talk with your doctor about using decongestants or a corticosteroid nasal spray. When should you call for help? Watch closely for changes in your health, and be sure to contact your doctor if:  · You still have sleep apnea even though you have made lifestyle changes. · You are thinking of trying a device such as CPAP. · You are having problems using a CPAP or similar machine. Where can you learn more? Go to DianDian. Enter F852 in the search box to learn more about \"Sleep Apnea: After Your Visit. \"   © 8416-7222 Healthwise, Incorporated. Care instructions adapted under license by [facility] (which disclaims liability or warranty for this information). This care instruction is for use with your licensed healthcare professional. If you have questions about a medical condition or this instruction, always ask your healthcare professional. Ajay Hail any warranty or liability for your use of this information. PROPER SLEEP HYGIENE    What to avoid  · Do not have drinks with caffeine, such as coffee or black tea, for 8 hours before bed. · Do not smoke or use other types of tobacco near bedtime. Nicotine is a stimulant and can keep you awake. · Avoid drinking alcohol late in the evening, because it can cause you to wake in the middle of the night. · Do not eat a big meal close to bedtime. If you are hungry, eat a light snack. · Do not drink a lot of water close to bedtime, because the need to urinate may wake you up during the night. · Do not read or watch TV in bed. Use the bed only for sleeping and sexual activity. What to try  · Go to bed at the same time every night, and wake up at the same time every morning. Do not take naps during the day. · Keep your bedroom quiet, dark, and cool. · Get regular exercise, but not within 3 to 4 hours of your bedtime. .  · Sleep on a comfortable pillow and mattress.   · If watching the clock makes you anxious, turn it facing away from you so you cannot see the time. · If you worry when you lie down, start a worry book. Well before bedtime, write down your worries, and then set the book and your concerns aside. · Try meditation or other relaxation techniques before you go to bed. · If you cannot fall asleep, get up and go to another room until you feel sleepy. Do something relaxing. Repeat your bedtime routine before you go to bed again. · Make your house quiet and calm about an hour before bedtime. Turn down the lights, turn off the TV, log off the computer, and turn down the volume on music. This can help you relax after a busy day. Drowsy Driving  The [address] [place] [address] Road Traffic Safety Administration cites drowsiness as a causing factor in more than 126,773 police reported crashes annually, resulting in 76,000 injuries and 1,500 deaths. Other surveys suggest 55% of people polled have driven while drowsy in the past year, 23% had fallen asleep but not crashed, 3% crashed, and 2% had and accident due to drowsy driving. Who is at risk? Young Drivers: One study of drowsy driving accidents states that 55% of the drivers were under 25 years. Of those, 75% were male. Shift Workers and Travelers: People who work overnight or travel across time zones frequently are at higher risk of experiencing Circadian Rhythm Disorders. They are trying to work and function when their body is programed to sleep. Sleep Deprived: Lack of sleep has a serious impact on your ability to pay attention or focus on a task. Consistently getting less than the average of 8 hours your body needs creates partial or cumulative sleep deprivation. Untreated Sleep Disorders: Sleep Apnea, Narcolepsy, R.L.S., and other sleep disorders (untreated) prevent a person from getting enough restful sleep. This leads to excessive daytime sleepiness and increases the risk for drowsy driving accidents by up to 7 times.   Medications / Alcohol: Even over the counter medications can cause drowsiness. Medications that impair a drivers attention should have a warning label. Alcohol naturally makes you sleepy and on its own can cause accidents. Combined with excessive drowsiness its effects are amplified. Signs of Drowsy Driving:   * You don't remember driving the last few miles   * You may drift out of your mariella   * You are unable to focus and your thoughts wander   * You may yawn more often than normal   * You have difficulty keeping your eyes open / nodding off   * Missing traffic signs, speeding, or tailgating  Prevention-   Good sleep hygiene, lifestyle and behavioral choices have the most impact on drowsy driving. There is no substitute for sleep and the average person requires 8 hours nightly. If you find yourself driving drowsy, stop and sleep. Consider the sleep hygiene tips provided during your visit as well. Medication Refill Policy: Refills for all medications require 1 week advance notice. Please have your pharmacy fax a refill request. We are unable to fax, or call in \"controled substance\" medications and you will need to pick these prescriptions up from our office. testbirds Activation    Thank you for requesting access to testbirds. Please follow the instructions below to securely access and download your online medical record. testbirds allows you to send messages to your doctor, view your test results, renew your prescriptions, schedule appointments, and more. How Do I Sign Up? 1. In your internet browser, go to [email or web address]. 2. Click on the First Time User? Click Here link in the Sign In box. You will see the New Member Sign Up page. 3. Enter your testbirds Access Code exactly as it appears below. You will not need to use this code after youve completed the sign-up process. If you do not sign up before the expiration date, you must request a new code. testbirds Access Code:  Activation code not generated  Current testbirds Status: Active (This is the date your Redeem access code will )    4. Enter the last four digits of your Social Security Number (xxxx) and Date of Birth (mm/dd/yyyy) as indicated and click Submit. You will be taken to the next sign-up page. 5. Create a Weole Energyt ID. This will be your Redeem login ID and cannot be changed, so think of one that is secure and easy to remember. 6. Create a Redeem password. You can change your password at any time. 7. Enter your Password Reset Question and Answer. This can be used at a later time if you forget your password. 8. Enter your e-mail address. You will receive e-mail notification when new information is available in [address] 9. Click Sign Up. You can now view and download portions of your medical record. 10. Click the Download Summary menu link to download a portable copy of your medical information. Additional Information    If you have questions, please call [phone]. Remember, Redeem is NOT to be used for urgent needs. For medical emergencies, dial 911.

## 2017-08-22 NOTE — PROGRESS NOTES
217 [facility]., [place]. [place], [address]  Tel.  [phone]  Fax. 100 [facility] 60  [place], [address] [facility]  Tel.  [phone]  Fax. [phone] [address] [facility] [address]   Tel.  [phone]  Fax. [phone]       S>Shorty Lora is a 40 y.o. male seen today to receive a home sleep testing unit (HST). · Patient was educated on proper hookup and operation of the HST. · Instruction forms and documentation were reviewed and signed. · The patient demonstrated good understanding of the HST. O>    Visit Vitals    /88    Pulse (!) 102    Ht 5' 7\" (1.702 m)    Wt 295 lb 11.2 oz (134.1 kg)    SpO2 96%    BMI 46.31 kg/m2    Neck circ. in \"inches\": 18    A>  1. LASHAUN (obstructive sleep apnea)    2. BMI 45.0-49.9, adult ([address]    3. H/O: depression          P>  · General information regarding operations and maintenance of the device was provided. · He was provided information on sleep apnea including coresponding risk factors and the importance of proper treatment. · Follow-up appointment was made to return the HST. He will be contacted once the results have been reviewed. · He was asked to contact our office for any problems regarding his home sleep test study.

## 2017-08-24 ENCOUNTER — TELEPHONE (OUTPATIENT)
Dept: SLEEP MEDICINE | Age: 37
End: 2017-08-24

## 2017-08-24 DIAGNOSIS — G47.33 OSA (OBSTRUCTIVE SLEEP APNEA): Primary | ICD-10-CM

## 2017-08-24 NOTE — TELEPHONE ENCOUNTER
[facility]    Date of Study: 8/22/17    The following information was gathered from the patients study log:    · Lights off: 8:43P  · Estimated sleep onset: 9:25P    · Awakened a total of 3 times  · The patient felt they slept 7 hours  · Patient took no sleep aid before starting the test  · Sleep quality was the same compared to a usual nights sleep.     Further information provided: -

## 2017-08-25 ENCOUNTER — DOCUMENTATION ONLY (OUTPATIENT)
Dept: SLEEP MEDICINE | Age: 37
End: 2017-08-25

## 2017-08-25 NOTE — TELEPHONE ENCOUNTER
Results of Sleep Testing, PAP prescription and follow-up discussed with patient. Patient encouraged to call if there were any further questions regarding sleep symptoms. Encounter Diagnosis   Name Primary?  LASHAUN (obstructive sleep apnea) Yes       Orders Placed This Encounter    AMB SUPPLY ORDER     Diagnosis: Obstructive Sleep Apnea ICD-10 Code (G47.33)    Positive Airway Pressure Therapy: Duration of need: 99 months. ResMed APAP Device: Minimum Pressure: 4 cmH2O, Maximum Pressure: 20 cmH2O. Ramp Time: 30 Minutes. EPR: 2. Nocturnal Oximetry on PAP without supplemental oxygen. ASAP. CPAP mask -  Patient preference, headgear, tubing, and filter;  heated humidifier; wireless modem. Remote monitoring enrollment. Bhakti Wilson MD, FAASM; NPI: [id number]  Electronically signed. 08/25/17

## 2017-08-29 DIAGNOSIS — F98.8 ADD (ATTENTION DEFICIT DISORDER): ICD-10-CM

## 2017-09-01 ENCOUNTER — TELEPHONE (OUTPATIENT)
Dept: INTERNAL MEDICINE CLINIC | Age: 37
End: 2017-09-01

## 2017-09-01 RX ORDER — DEXTROAMPHETAMINE SACCHARATE, AMPHETAMINE ASPARTATE, DEXTROAMPHETAMINE SULFATE AND AMPHETAMINE SULFATE 2.5; 2.5; 2.5; 2.5 MG/1; MG/1; MG/1; MG/1
10 TABLET ORAL DAILY
Qty: 30 TAB | Refills: 0 | Status: SHIPPED | OUTPATIENT
Start: 2017-09-01 | End: 2017-10-06 | Stop reason: SDUPTHER

## 2017-09-01 NOTE — TELEPHONE ENCOUNTER
Called and spoke with patient to let him know that the patient's prescription for Adderall is ready for pickup

## 2017-09-01 NOTE — TELEPHONE ENCOUNTER
From: Isabel Montano  To: Jaylene Garcia MD  Sent: 8/29/2017 5:48 PM EDT  Subject: Medication Renewal Request    Original authorizing provider: MD Isabel Foy would like a refill of the following medications:  dextroamphetamine-amphetamine (ADDERALL) 10 mg tablet Jaylene Garcia MD]    Preferred pharmacy: [facility] Carolin Ochoa    Comment:

## 2017-09-01 NOTE — TELEPHONE ENCOUNTER
Last refill 7/26/17  Last visit 7/26/17    He has an appt with psych, we typically will fill until they can get into psych. So he is just waiting for his appt.

## 2017-09-18 ENCOUNTER — TELEPHONE (OUTPATIENT)
Dept: SLEEP MEDICINE | Age: 37
End: 2017-09-18

## 2017-09-18 NOTE — TELEPHONE ENCOUNTER
Patient called stating he has used his pap device for 2 nights, after the first night he woke up with a bump on the bridge of his nose and on the second night when he woke up the bridge of his nose was bleeding. DME asked him to give us a call and see if [phone] would prefer him come in for a mask fitting to try another mask or for him to use gecko. They were not sure what to use on his open wound.

## 2017-10-10 NOTE — TELEPHONE ENCOUNTER
Last office visit 7/26/17  Last refill 9/1/17    He has called to see if this is ready, he placed on Friday at 3:30. Told him we have 24-48 BUSINESS HOURS.

## 2017-10-19 RX ORDER — DEXTROAMPHETAMINE SACCHARATE, AMPHETAMINE ASPARTATE, DEXTROAMPHETAMINE SULFATE AND AMPHETAMINE SULFATE 2.5; 2.5; 2.5; 2.5 MG/1; MG/1; MG/1; MG/1
10 TABLET ORAL DAILY
Qty: 30 TAB | Refills: 0 | Status: SHIPPED | OUTPATIENT
Start: 2017-10-19 | End: 2017-11-17 | Stop reason: SDUPTHER

## 2017-10-23 ENCOUNTER — DOCUMENTATION ONLY (OUTPATIENT)
Dept: SLEEP MEDICINE | Age: 37
End: 2017-10-23

## 2017-10-24 ENCOUNTER — OFFICE VISIT (OUTPATIENT)
Dept: SLEEP MEDICINE | Age: 37
End: 2017-10-24

## 2017-10-24 VITALS
SYSTOLIC BLOOD PRESSURE: 116 MMHG | HEIGHT: 67 IN | WEIGHT: 299.9 LBS | BODY MASS INDEX: 47.07 KG/M2 | DIASTOLIC BLOOD PRESSURE: 81 MMHG | OXYGEN SATURATION: 95 % | HEART RATE: 93 BPM

## 2017-10-26 ENCOUNTER — OFFICE VISIT (OUTPATIENT)
Dept: SLEEP MEDICINE | Age: 37
End: 2017-10-26

## 2017-10-26 VITALS
HEIGHT: 67 IN | BODY MASS INDEX: 46.93 KG/M2 | DIASTOLIC BLOOD PRESSURE: 83 MMHG | OXYGEN SATURATION: 94 % | HEART RATE: 106 BPM | SYSTOLIC BLOOD PRESSURE: 132 MMHG | WEIGHT: 299 LBS

## 2017-10-26 DIAGNOSIS — Z86.59 H/O: DEPRESSION: ICD-10-CM

## 2017-10-26 DIAGNOSIS — G47.33 OSA (OBSTRUCTIVE SLEEP APNEA): Primary | ICD-10-CM

## 2017-10-26 NOTE — PATIENT INSTRUCTIONS
217 [facility] [place]. [place], [address]  Tel.  [phone]  Fax. 100 [facility] 60  [place], [address] [facility]  Tel.  [phone]  Fax. [phone] [address]  Tel.  [phone]  Fax. [phone]     Learning About CPAP for Sleep Apnea  What is CPAP? CPAP is a small machine that you use at home every night while you sleep. It increases air pressure in your throat to keep your airway open. When you have sleep apnea, this can help you sleep better so you feel much better. CPAP stands for \"continuous positive airway pressure. \"  The CPAP machine will have one of the following:  · A mask that covers your nose and mouth  · Prongs that fit into your nose  · A mask that covers your nose only, the most common type. This type is called NCPAP. The N stands for \"nasal.\"  Why is it done? CPAP is usually the best treatment for obstructive sleep apnea. It is the first treatment choice and the most widely used. Your doctor may suggest CPAP if you have:  · Moderate to severe sleep apnea. · Sleep apnea and coronary artery disease (CAD) or heart failure. How does it help? · CPAP can help you have more normal sleep, so you feel less sleepy and more alert during the daytime. · CPAP may help keep heart failure or other heart problems from getting worse. · NCPAP may help lower your blood pressure. · If you use CPAP, your bed partner may also sleep better because you are not snoring or restless. What are the side effects? Some people who use CPAP have:  · A dry or stuffy nose and a sore throat. · Irritated skin on the face. · Sore eyes. · Bloating. If you have any of these problems, work with your doctor to fix them. Here are some things you can try:  · Be sure the mask or nasal prongs fit well. · See if your doctor can adjust the pressure of your CPAP. · If your nose is dry, try a humidifier.   · If your nose is runny or stuffy, try decongestant medicine or a steroid nasal spray. If these things do not help, you might try a different type of machine. Some machines have air pressure that adjusts on its own. Others have air pressures that are different when you breathe in than when you breathe out. This may reduce discomfort caused by too much pressure in your nose. Where can you learn more? Go to Beem.be  Enter Jeannie Bess in the search box to learn more about \"Learning About CPAP for Sleep Apnea. \"   © 7204-8795 Healthwise, Incorporated. Care instructions adapted under license by [employer] (which disclaims liability or warranty for this information). This care instruction is for use with your licensed healthcare professional. If you have questions about a medical condition or this instruction, always ask your healthcare professional. [address] any warranty or liability for your use of this information. Content Version: 1.7.82095; Last Revised: January 11, 2010  PROPER SLEEP HYGIENE    What to avoid  · Do not have drinks with caffeine, such as coffee or black tea, for 8 hours before bed. · Do not smoke or use other types of tobacco near bedtime. Nicotine is a stimulant and can keep you awake. · Avoid drinking alcohol late in the evening, because it can cause you to wake in the middle of the night. · Do not eat a big meal close to bedtime. If you are hungry, eat a light snack. · Do not drink a lot of water close to bedtime, because the need to urinate may wake you up during the night. · Do not read or watch TV in bed. Use the bed only for sleeping and sexual activity. What to try  · Go to bed at the same time every night, and wake up at the same time every morning. Do not take naps during the day. · Keep your bedroom quiet, dark, and cool. · Get regular exercise, but not within 3 to 4 hours of your bedtime. .  · Sleep on a comfortable pillow and mattress.   · If watching the clock makes you anxious, turn it facing away from you so you cannot see the time. · If you worry when you lie down, start a worry book. Well before bedtime, write down your worries, and then set the book and your concerns aside. · Try meditation or other relaxation techniques before you go to bed. · If you cannot fall asleep, get up and go to another room until you feel sleepy. Do something relaxing. Repeat your bedtime routine before you go to bed again. · Make your house quiet and calm about an hour before bedtime. Turn down the lights, turn off the TV, log off the computer, and turn down the volume on music. This can help you relax after a busy day. Drowsy Driving: The [employer] cites drowsiness as a causing factor in more than 796,400 police reported crashes annually, resulting in 76,000 injuries and 1,500 deaths. Other surveys suggest 55% of people polled have driven while drowsy in the past year, 23% had fallen asleep but not crashed, 3% crashed, and 2% had and accident due to drowsy driving. Who is at risk? Young Drivers: One study of drowsy driving accidents states that 55% of the drivers were under 25 years. Of those, 75% were male. Shift Workers and Travelers: People who work overnight or travel across time zones frequently are at higher risk of experiencing Circadian Rhythm Disorders. They are trying to work and function when their body is programed to sleep. Sleep Deprived: Lack of sleep has a serious impact on your ability to pay attention or focus on a task. Consistently getting less than the average of 8 hours your body needs creates partial or cumulative sleep deprivation. Untreated Sleep Disorders: Sleep Apnea, Narcolepsy, R.L.S., and other sleep disorders (untreated) prevent a person from getting enough restful sleep. This leads to excessive daytime sleepiness and increases the risk for drowsy driving accidents by up to 7 times.   Medications / Alcohol: Even over the counter medications can cause drowsiness. Medications that impair a drivers attention should have a warning label. Alcohol naturally makes you sleepy and on its own can cause accidents. Combined with excessive drowsiness its effects are amplified. Signs of Drowsy Driving:   * You don't remember driving the last few miles   * You may drift out of your mariella   * You are unable to focus and your thoughts wander   * You may yawn more often than normal   * You have difficulty keeping your eyes open / nodding off   * Missing traffic signs, speeding, or tailgating  Prevention-   Good sleep hygiene, lifestyle and behavioral choices have the most impact on drowsy driving. There is no substitute for sleep and the average person requires 8 hours nightly. If you find yourself driving drowsy, stop and sleep. Consider the sleep hygiene tips provided during your visit as well. Medication Refill Policy: Refills for all medications require 1 week advance notice. Please have your pharmacy fax a refill request. We are unable to fax, or call in \"controled substance\" medications and you will need to pick these prescriptions up from our office. Internet Mall Activation    Thank you for requesting access to Internet Mall. Please follow the instructions below to securely access and download your online medical record. Internet Mall allows you to send messages to your doctor, view your test results, renew your prescriptions, schedule appointments, and more. How Do I Sign Up? 1. In your internet browser, go to [email or web address]. 2. Click on the First Time User? Click Here link in the Sign In box. You will see the New Member Sign Up page. 3. Enter your Internet Mall Access Code exactly as it appears below. You will not need to use this code after youve completed the sign-up process. If you do not sign up before the expiration date, you must request a new code. Internet Mall Access Code:  Activation code not generated  Current SeroMatch Status: Active (This is the date your SeroMatch access code will )    4. Enter the last four digits of your Social Security Number (xxxx) and Date of Birth (mm/dd/yyyy) as indicated and click Submit. You will be taken to the next sign-up page. 5. Create a Idun Pharmaceuticalst ID. This will be your SeroMatch login ID and cannot be changed, so think of one that is secure and easy to remember. 6. Create a SeroMatch password. You can change your password at any time. 7. Enter your Password Reset Question and Answer. This can be used at a later time if you forget your password. 8. Enter your e-mail address. You will receive e-mail notification when new information is available in [address] 9. Click Sign Up. You can now view and download portions of your medical record. 10. Click the Download Summary menu link to download a portable copy of your medical information. Additional Information    If you have questions, please call [phone]. Remember, SeroMatch is NOT to be used for urgent needs. For medical emergencies, dial 911.

## 2017-10-26 NOTE — PROGRESS NOTES
[address] [facility] [place]. [place], [address]  Tel.  [phone]  Fax. 100 [facility] 60  [place], [address]  Tel.  [phone]  Fax. [phone] [address]   Tel.  [phone]  Fax. [phone]     S>Shorty Perry is a 40 y.o. male seen for a positive airway pressure follow-up. He reports no problems using the device. He is 100% compliant over the past 30 days. The following problems are identified:    Drowsiness no Problems exhaling no   Snoring no Forget to put on no   Mask Comfortable yes Can't fall asleep no   Dry Mouth no Mask falls off no   Air Leaking no Frequent awakenings no       He admits that his sleep has improved. He reports of developing nasal abrasion following use of ReMed F20 FF mask - these are improving following a change in mask. He is being followed by a dermatologist.     Allergies   Allergen Reactions    Sulfa (Sulfonamide Antibiotics) Nausea and Vomiting       He has a current medication list which includes the following prescription(s): dextroamphetamine-amphetamine and hydrocodone-acetaminophen. .      He  has a past medical history of Mixed hyperlipidemia; Obesity; and Persistent disorder of initiating or maintaining sleep. [place] Sleepiness Score: 0   and Modified F.O.S.Q. Score Total / 2: 18   which reflect improved sleep quality over therapy time.     O>    Visit Vitals    /83    Pulse (!) 106    Ht 5' 7\" (1.702 m)    Wt 299 lb (135.6 kg)    SpO2 94%    BMI 46.83 kg/m2         General:   Not in acute distress   Eyes:  Anicteric sclerae, no obvious strabismus   Nose:  No obvious nasal septum deviation    Oropharynx:   Class 4 oropharyngeal outlet, thick tongue base, uvula not seen due to low-lying soft palate, narrow tonsilo-pharyngeal pilars   Tonsils:   tonsils are not visualized due to low-lying soft palate   Neck:   midline trachea   Chest/Lungs:  Equal lung expansion, clear on auscultation    CVS:  Normal rate, regular rhythm; no JVD   Skin:  Warm to touch; no obvious rashes   Neuro:  No focal deficits ; no obvious tremor    Psych:  Normal affect,  normal countenance;           A>    ICD-10-CM ICD-9-CM    1. LASHAUN (obstructive sleep apnea) G47.33 327.23    2. BMI 45.0-49.9, adult (HCC) Z68.42 V85.42    3. H/O: depression Z86.59 V11.8      AHI = 37.5. On Resmed :  APAP 4 - 20 cmH2O. Compliant:      yes    Therapeutic Response:  Positive    P>    * We have recommended a dedicated weight loss through appropriate diet and an exercise regiment as significant weight reduction has been shown to reduce severity of obstructive sleep apnea. * Follow-up Disposition:  Return in about 1 year (around 10/26/2018), or if symptoms worsen or fail to improve. * He was asked to contact our office for any problems regarding PAP therapy. * Counseling was provided regarding the importance of regular PAP use and on proper sleep hygiene and safe driving. * Re-enforced proper and regular cleaning for the device. Thank you for allowing us to participate in your patient's medical care. Kaden Tamez MD, FAASM  Electronically signed.  10/26/17

## 2017-10-27 ENCOUNTER — OFFICE VISIT (OUTPATIENT)
Dept: INTERNAL MEDICINE CLINIC | Age: 37
End: 2017-10-27

## 2017-10-27 ENCOUNTER — HOSPITAL ENCOUNTER (OUTPATIENT)
Dept: GENERAL RADIOLOGY | Age: 37
Discharge: HOME | End: 2017-10-27

## 2017-10-27 VITALS
OXYGEN SATURATION: 98 % | TEMPERATURE: 99.1 F | RESPIRATION RATE: 18 BRPM | DIASTOLIC BLOOD PRESSURE: 88 MMHG | HEART RATE: 94 BPM | HEIGHT: 67 IN | BODY MASS INDEX: 46.62 KG/M2 | SYSTOLIC BLOOD PRESSURE: 120 MMHG | WEIGHT: 297 LBS

## 2017-10-27 DIAGNOSIS — M79.605 PAIN OF LEFT LOWER EXTREMITY: ICD-10-CM

## 2017-10-27 DIAGNOSIS — E66.01 MORBIDLY OBESE (HCC): ICD-10-CM

## 2017-10-27 DIAGNOSIS — F90.0 ATTENTION DEFICIT HYPERACTIVITY DISORDER (ADHD), PREDOMINANTLY INATTENTIVE TYPE: ICD-10-CM

## 2017-10-27 DIAGNOSIS — M25.552 LEFT HIP PAIN: ICD-10-CM

## 2017-10-27 DIAGNOSIS — M25.552 LEFT HIP PAIN: Primary | ICD-10-CM

## 2017-10-27 DIAGNOSIS — G47.33 OBSTRUCTIVE SLEEP APNEA SYNDROME: ICD-10-CM

## 2017-10-27 PROCEDURE — 73502 X-RAY EXAM HIP UNI 2-3 VIEWS: CPT

## 2017-10-27 RX ORDER — DIFLUNISAL 500 MG/1
500 TABLET, FILM COATED ORAL 2 TIMES DAILY
Qty: 20 TAB | Refills: 0 | Status: SHIPPED | OUTPATIENT
Start: 2017-10-27 | End: 2017-11-06

## 2017-10-27 NOTE — PROGRESS NOTES
Pt here to be seen for left leg pain and pain in the hip he states that this began a few months, when he sits for an extended period of time or when he walks the pain becomes worse. Chief Complaint   Patient presents with    Leg Pain    Hip Pain     Visit Vitals    /88 (BP 1 Location: Right arm, BP Patient Position: Sitting)    Pulse 94    Temp 99.1 °F (37.3 °C) (Oral)    Resp 18    Ht 5' 7\" (1.702 m)    Wt 297 lb (134.7 kg)    SpO2 98%    BMI 46.52 kg/m2     1. Have you been to the ER, urgent care clinic since your last visit? Hospitalized since your last visit? No    2. Have you seen or consulted any other health care providers outside of the [address] [place] [address] since your last visit? Include any pap smears or colon screening.  Ana María Kwaneuropsychologist Oct 6th 2017

## 2017-10-27 NOTE — MR AVS SNAPSHOT
Visit Information Date & Time Provider Department Dept. Phone Encounter #  
 10/27/2017 11:30 AM Sandro Malloy, [address] [facility],[address] Internal Medicine [phone] [id number] Upcoming Health Maintenance Date Due DTaP/Tdap/Td series (3 - Td) 4/12/2021 Allergies as of 10/27/2017  Review Complete On: 10/27/2017 By: Sandro Malloy MD  
  
 Severity Noted Reaction Type Reactions Sulfa (Sulfonamide Antibiotics)  11/11/2009    Nausea and Vomiting Current Immunizations  Reviewed on 4/12/2011 Name Date  
 TD Vaccine 1/1/1998 TDAP Vaccine 4/12/2011 Not reviewed this visit You Were Diagnosed With   
  
 Codes Comments Left hip pain    -  Primary ICD-10-CM: C12.715 ICD-9-CM: 719.45 Pain of left lower extremity     ICD-10-CM: M79.605 ICD-9-CM: 729.5 Obstructive sleep apnea syndrome     ICD-10-CM: G47.33 
ICD-9-CM: 327.23 Attention deficit hyperactivity disorder (ADHD), predominantly inattentive type     ICD-10-CM: F90.0 ICD-9-CM: 314.00 Morbidly obese (HCC)     ICD-10-CM: E66.01 
ICD-9-CM: 278.01 Vitals BP Pulse Temp Resp Height(growth percentile) Weight(growth percentile) 120/88 (BP 1 Location: Right arm, BP Patient Position: Sitting) 94 99.1 °F (37.3 °C) (Oral) 18 5' 7\" (1.702 m) 297 lb (134.7 kg) SpO2 BMI Smoking Status 98% 46.52 kg/m2 Never Smoker Vitals History BMI and BSA Data Body Mass Index Body Surface Area  
 46.52 kg/m 2 2.52 m 2 Preferred Pharmacy Pharmacy Name Phone [address] [place], [facility] [address] Your Updated Medication List  
  
   
This list is accurate as of: 10/27/17  1:07 PM.  Always use your most recent med list.  
  
  
  
  
 dextroamphetamine-amphetamine 10 mg tablet Commonly known as:  ADDERALL Take 1 Tab (10 mg total) by mouth dailyEarliest Fill Date: 10/19/17.   Max Daily Amount: 10 mg  
  
 diflunisal 500 mg Tab Commonly known as:  DOLOBID Take 1 Tab by mouth two (2) times a day for 10 days. Prescriptions Sent to Pharmacy Refills  
 diflunisal (DOLOBID) 500 mg tab 0 Sig: Take 1 Tab by mouth two (2) times a day for 10 days. Class: Normal  
 Pharmacy: [address] [facility], [facility] [address]  #: [phone] Route: Oral  
  
We Performed the Following REFERRAL TO PHYSICAL THERAPY [GOB25 Custom] To-Do List   
 10/27/2017 Imaging:  XR HIP LT W OR WO PELV 2-3 VWS Referral Information Referral ID Referred By Referred To  
  
 [id number], [address], [address] [place] [address] Road Phone: [phone] Fax: [phone] Visits Status Start Date End Date 1 New Request 10/27/17 10/27/18 If your referral has a status of pending review or denied, additional information will be sent to support the outcome of this decision. Introducing \[facility] [place] [facility]\"" & HEALTH SERVICES! Dear Parker Garcia: Thank you for requesting a [employer] account. Our records indicate that you already have an active [employer] account. You can access your account anytime at [email or web address] Did you know that you can access your hospital and ER discharge instructions at any time in [employer]? You can also review all of your test results from your hospital stay or ER visit. Additional Information If you have questions, please visit the Frequently Asked Questions section of the [employer] website at [email or web address] Remember, [employer] is NOT to be used for urgent needs. For medical emergencies, dial 911. Now available from your iPhone and Android! Please provide this summary of care documentation to your next provider. Your primary care clinician is listed as Charlie Vasquez.  If you have any questions after today's visit, please call [phone].

## 2017-10-27 NOTE — PROGRESS NOTES
Written by Amanda Kenny, as dictated by Dr. Kandy Johns MD.    Lloyd Castrejon is a 40 y.o. male. HPI  The patient comes in today c/o L pain behind the knee x 3 weeks, especially after he has been seated for a while and stands up. He has also been experiencing L hip pain x a x 2 months, also especially after he has been seated for a while and stands up. He does not think he twisted his hip wrong recently. He has been taking Excedrin for this pain which helps little. He has lost a few lbs, from 300 lbs in 07/2017 to 297 lbs today. He has not tried weight watchers or any other supervised weight loss program.    He had an appointment with sleep medicine, and he has been using a CPAP which has been helping. He says his headaches have been improving and he does not feel as fatigued. He is compliant on Adderall, and has noticed that being on a CPAP has helped him not feel as fatigued while he is taking Adderall. He has psych testing scheduled for next week, and has already met with Dr. Radha Andrade who wants him to be taking Adderall for testing. He declined the flu shot. 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 Patient Active Problem List   Diagnosis Code    Hyperlipemia E78.5    Acne vulgaris L70.0    Depression F32.9    Chronic nonintractable headache R51        Current Outpatient Prescriptions on File Prior to Visit   Medication Sig Dispense Refill    dextroamphetamine-amphetamine (ADDERALL) 10 mg tablet Take 1 Tab (10 mg total) by mouth dailyEarliest Fill Date: 10/19/17. Max Daily Amount: 10 mg 30 Tab 0     No current facility-administered medications on file prior to visit.         Allergies   Allergen Reactions    Sulfa (Sulfonamide Antibiotics) Nausea and Vomiting       Past Medical History:   Diagnosis Date    Mixed hyperlipidemia     Obesity     Persistent disorder of initiating or maintaining sleep     Sleep apnea        Past Surgical History:   Procedure Laterality Date    HX ORTHOPAEDIC  '05    rt. shoulder - \"slap\" injury       Family History   Problem Relation Age of Onset    Cancer Father      colon    Asthma Sister        Social History     Social History    Marital status: SINGLE     Spouse name: Single    Number of children: 0    Years of education: N/A     Occupational History    IT      Social History Main Topics    Smoking status: Never Smoker    Smokeless tobacco: Never Used    Alcohol use 1.5 oz/week     3 Standard drinks or equivalent per week    Drug use: No    Sexual activity: Yes     Partners: Female     Other Topics Concern    Not on file     Social History Narrative           Review of Systems   Constitutional: Negative for malaise/fatigue. HENT: Negative for congestion. Respiratory: Negative for cough and shortness of breath. Musculoskeletal: Positive for joint pain. Negative for myalgias. Neurological: Negative for weakness and headaches. Psychiatric/Behavioral: Negative for depression, memory loss and substance abuse. The patient does not have insomnia. Visit Vitals    /88 (BP 1 Location: Right arm, BP Patient Position: Sitting)    Pulse 94    Temp 99.1 °F (37.3 °C) (Oral)    Resp 18    Ht 5' 7\" (1.702 m)    Wt 297 lb (134.7 kg)    SpO2 98%    BMI 46.52 kg/m2       Physical Exam   Constitutional: He is oriented to person, place, and time. He appears well-developed. No distress. Morbidly obese   HENT:   Right Ear: External ear normal.   Left Ear: External ear normal.   Eyes: Conjunctivae and EOM are normal. Right eye exhibits no discharge. Left eye exhibits no discharge. Neck: Normal range of motion. Neck supple. Cardiovascular: Normal rate and regular rhythm. Pulmonary/Chest: Effort normal and breath sounds normal. He has no wheezes. Abdominal: Soft. Bowel sounds are normal. There is no tenderness.    Musculoskeletal:   SLRT + > 45 degrees   Lymphadenopathy:     He has no cervical adenopathy. Neurological: He is alert and oriented to person, place, and time. Skin: He is not diaphoretic. Psychiatric: He has a normal mood and affect. His behavior is normal.   Nursing note and vitals reviewed. ASSESSMENT and PLAN    ICD-10-CM ICD-9-CM    1. Left hip pain M25.552 719.45 XR HIP LT W OR WO PELV 2-3 VWS      REFERRAL TO PHYSICAL THERAPY      diflunisal (DOLOBID) 500 mg tab sent to pharmacy   2. Pain of left lower extremity M79.605 729.5 XR HIP LT W OR WO PELV 2-3 VWS      REFERRAL TO PHYSICAL THERAPY      diflunisal (DOLOBID) 500 mg tab sent to pharmacy    Hip XR ordered. I want him to start on Dolobid BID for 5 days. If he does not experience any improvement, I will refer him for PT. If PT and medication does not work, I can order further imaging. 3. Obstructive sleep apnea syndrome G47.33 327.23 He has been using a CPAP machine, which has been helping with his headaches and fatigue. 4. Attention deficit hyperactivity disorder (ADHD), predominantly inattentive type F90.0 314.00 He takes Adderall and is scheduled for psych testing next week. 5. Morbidly obese (HCC) E66.01 278.01 I encouraged him to try weight watchers or another diet plan. This plan was reviewed with the patient and patient agrees. All questions were answered. This scribe documentation was reviewed by me and accurately reflects the examination and decisions made by me. This note will not be viewable in [address]

## 2017-10-27 NOTE — PROGRESS NOTES
Shorty, hip x-ray came back normal. If pain does not improve with Dolobid ,please make an appointment for Physical therapy.

## 2017-11-03 ENCOUNTER — HOSPITAL ENCOUNTER (OUTPATIENT)
Dept: PHYSICAL THERAPY | Age: 37
Discharge: HOME | End: 2017-11-03
Payer: COMMERCIAL

## 2017-11-03 PROCEDURE — 97161 PT EVAL LOW COMPLEX 20 MIN: CPT

## 2017-11-03 PROCEDURE — 97110 THERAPEUTIC EXERCISES: CPT

## 2017-11-03 NOTE — PROGRESS NOTES
[facility]  [address]  [place], [address] [facility]  Phone: [phone]  Fax: [phone]    Plan of Care/Statement of Necessity for Physical Therapy Services  2-15    Patient name: Bernarda Taylor  : 1980  Provider#: [id number]  Referral source: hCristie Lake MD      Medical/Treatment Diagnosis: Left hip pain [M25.552]  Left leg pain [M79.605]     Prior Hospitalization: see medical history     Comorbidities: depression   Prior Level of Function: able to transfer sit to stand, walk, ascend/descend stairs w/out pain/limitation  Medications: Verified on Patient Summary List    Start of Care: 11/3/2017    Onset Date: 2017       The Plan of Care and following information is based on the information from the initial evaluation. Assessment/ key information: pt is a 40year old male presents w/ signs/sx that suggest L hip flexor tendonitis, L patellar tendonitis and possible L LE radiculopathy     Evaluation Complexity History MEDIUM  Complexity : 1-2 comorbidities / personal factors will impact the outcome/ POC ; Examination LOW Complexity : 1-2 Standardized tests and measures addressing body structure, function, activity limitation and / or participation in recreation  ;Presentation MEDIUM Complexity : Evolving with changing characteristics  ; Clinical Decision Making MEDIUM Complexity : FOTO score of 26-74  Overall Complexity Rating: LOW     Problem List: pain affecting function, decrease ROM, decrease strength, impaired gait/ balance, decrease ADL/ functional abilitiies, decrease activity tolerance and decrease flexibility/ joint mobility   Treatment Plan may include any combination of the following: Therapeutic exercise, Therapeutic activities, Neuromuscular re-education, Physical agent/modality, Gait/balance training, Manual therapy, Aquatic therapy and Patient education  Patient / Family readiness to learn indicated by: asking questions, trying to perform skills and interest  Persons(s) to be included in education: patient (P)  Barriers to Learning/Limitations: None  Patient Goal (s): \"for pain to go away and lose weight\"   Patient Self Reported Health Status: poor  Rehabilitation Potential: fair    Short Term Goals: To be accomplished in 1-2 weeks:  1) Pt will be independent with HEP  2) Pt will demonstrate understanding/application of postural principles and recommendations   3) Pt will report >/= 25% decrease in symptoms   4) Pt will demonstrate proper abdominal brace sequencing and ability to hold >/= 10 seconds for improved core strength    Long Term Goals: To be accomplished in 6-8 weeks:  1) Pt will report >/= 75% decrease in symptoms   2) Pt will report ascend/descend stairs without pain   3) Pt will be able to ambulate greater than/= 15 min without increase of pain   4) Pt will demonstrate independence with modified exercise/gym routine without aggravation of sx. 5) Pt will report sit to stand transfer w/out incr pain   6) Pt will be able to perform 10 consecutive bridge w/ march w/out hip drop to demonstrate improved core strength in stance     Frequency / Duration: Patient to be seen 1-2 times per week for 6-8 weeks. Patient/ Caregiver education and instruction: self care, activity modification, brace/ splint application and exercises    [x]  Plan of care has been reviewed with WILDER Cao, PT 11/3/2017 2:39 PM    ________________________________________________________________________    I certify that the above Therapy Services are being furnished while the patient is under my care. I agree with the treatment plan and certify that this therapy is necessary.     [de-identified] Signature:____________________  Date:____________Time: _________

## 2017-11-03 NOTE — PROGRESS NOTES
PT INITIAL EVALUATION NOTE  2-15    Patient Name: Leila Osuna  Date:11/3/2017  : 1980  [x]  Patient  Verified  Payor: Claudia Valenzuela / Plan: Blanca Adams / Product Type: HMO /    In time: 1145 AM Out time:1245 PM  Total Treatment Time (min): 60  Total Timed Codes (min): 10   Visit #: 1     Treatment Area: Left hip pain [M25.552]  Left leg pain [M79.605]    SUBJECTIVE  Pain Level (0-10 scale): 0/10   Any medication changes, allergies to medications, adverse drug reactions, diagnosis change, or new procedure performed?: [] No    [x] Yes (see summary sheet for update)  Subjective:      Pt presents w/ chief complaint of L hip and knee pain, initial onset L hip insidious ~ 3 months ago w/ transfering to standing after prol sitting, gradually worsening and incr freq, sx then progress to involve post/lat aspect knee & prox lat gastroc w/ sitting and transfer sit to stand, felt inability to extend leg, in last week decr post pain but experiencing L ant knee pain w/ walking & more so w/ stairs; saw Dr. Av Perez 10/27/2017 for hip & knee pain, xray no abnormality, prescribed oral med, referred for PT; has taken 1 dose of oral medication 10/30/2017, has not taken more due to forgetting and \"trying to get used to the pain\"; pt notes currently notes decr L hip pain, feels may be due to taking oral medication &/or incr activity level     Pain:   5/10 max 0/10 min 0/10 now     Aggravated by: L hip sit to stand  L knee walking, stairs   Eased by: change of position   Location/description of symptoms: ant hip sharp pain w/ sit to stand lasting ~ 2 hours but typically decr w/ movement; ant knee pain dull pain     Diagnostic Tests: [] Lab work [x] X-rays    [] CT [] MRI     [] Other:  Results (per report of the patient):no abnormality     PMH: Significant for hx ankle/foot sprain bilat, depression    Social/Recreation/Work:  Pt works at [employer] in [employer] prol sitting at desk, has pain transitioning to standing from prol sitting, requires occas lifitng but avoiding currently; lives in apartment building, no stairs in apartment but occas uses from parking garage to 5th floor, does not exercise currently, has access to gym at job     Prior level of function: able to transfer sit to stand, walk, ascend/descend stairs w/out pain/limitation     Patient goal(s): \"for pain to go away and lose weight\"     OBJECTIVE/EXAMINATION    Observation:   Pt is obese     Movement/gait:   bilat toe out, decr step length, decr trunk rot       ROM:  [] Unable to assess                 AAROM                                           Right Left   Hip Flexion wnl wnl pain    Extension hypo hypo    Abduction wnl wnl    Adduction wnl wnl    IR wnl wnl pain    ER hypo Hypo pain   Knee Flexion wnl wnl    Extension hyper hyper     LUMB AROM wnl all motions, pain w/ ext >, lat flex bilat & rot bilat    Strength:  [] Unable to assess    Right Left   Hip Flexion 5 4 pain    Extension nt nt    Abduction 4 4 pain    Adduction nt nt    IR nt nt    ER 5 4 pain   Knee Flexion 5 4    extension  5 4 pain    Ankle DF 5 4     SLR R 5/5 L 4/5  QS wnl/= bilat   abdom brace good seq fair endur  Bridge fair    Neurosensory: sensation grossly intact to light touch bilat     Palpation:   Tender w/ palpation L prox hip flexor tendon, L patellar tendon, L4-5 S1 SP & w/ pressure at these levels     Functional Biomechanical Screen  SLS: 10 sec bilat, > difficulty stance L   Bilateral Squat: good mobility, trunk flex, pain L knee     Special Tests:  90/90 test + bilat L>R, neural tension L>R   Ely's test +  bilat L>R  Hip scour - bilat         Outcome Measure: Patient presents with an initial FOTO score 37/100    10 min Therapeutic Exercise:  [x] See flow sheet : patient education, instruction HEP    Rationale: increase ROM, increase strength, improve coordination, improve balance and increase proprioception to improve the patients ability to transfer sit to stand, walk, ascend/descend stairs w/out pain/limitation           With   [x] TE   [] TA   [] neuro   [] other: Patient Education: [x] Review HEP    [] Progressed/Changed HEP based on:   [x] positioning   [x] body mechanics   [] transfers   [x] heat/ice application    [x] other:  windy/path, POC and role of PT, activity modification, postural principles, sleeping position, workstation ergonomic         Pain Level (0-10 scale) post treatment: 0/10      ASSESSMENT:      [x]  See Plan of [address] [facility], PT 11/3/2017  11:56 AM

## 2017-11-07 ENCOUNTER — HOSPITAL ENCOUNTER (OUTPATIENT)
Dept: PHYSICAL THERAPY | Age: 37
Discharge: HOME | End: 2017-11-07
Payer: COMMERCIAL

## 2017-11-07 PROCEDURE — 97110 THERAPEUTIC EXERCISES: CPT

## 2017-11-07 PROCEDURE — 97140 MANUAL THERAPY 1/> REGIONS: CPT

## 2017-11-07 NOTE — PROGRESS NOTES
PT DAILY TREATMENT NOTE 2-15    Patient Name: Sunil Gilliam  Date:2017  : 1980  [x]  Patient  Verified  Payor: Ksenia Portillo / Plan: Farheen Harding / Product Type: HMO /    In time: 205 PM Out time: 320 PM  Total Treatment Time (min): 75  Visit #: 2     Treatment Area: Left hip pain [M25.552]  Left leg pain [M79.605]    SUBJECTIVE  Pain Level (0-10 scale): 1/10  Any medication changes, allergies to medications, adverse drug reactions, diagnosis change, or new procedure performed?: [x] No    [] Yes (see summary sheet for update)  Subjective functional status/changes:   [] No changes reported  Pt reports \"better\" since initial evaluation; has been taking prescribed antiinflammatory on regular schedule; reports trung HEP well, discomfort w/ hamstring stretch     OBJECTIVE    Modality rationale: decrease inflammation and decrease pain to improve the patients ability to transfer sit to stand, walk, ascend/descend stairs w/out pain/limitation   Min Type Additional Details    [] Estim: []Att   []Unatt        []TENS instruct                  []IFC  []Premod   []NMES                     []Other:  []w/US   []w/ice   []w/heat  Position:  Location:    []  Traction: [] Cervical       []Lumbar                       [] Prone          []Supine                       []Intermittent   []Continuous Lbs:  [] before manual  [] after manual  []w/heat    []  Ultrasound: []Continuous   [] Pulsed at:                            []1MHz   []3MHz Location:  W/cm2:    []  Paraffin         Location:  []w/heat   10 [x]  Ice     []  Heat  []  Ice massage Position: sup  Location: L knee, L prox hip flexor     []  Laser  []  Other: Position:  Location:    []  Vasopneumatic Device Pressure:       [] lo [] med [] hi   Temperature:    [x] Skin assessment post-treatment:  [x]intact []redness- no adverse reaction    []redness  adverse reaction:     55 min Therapeutic Exercise:  [x] See flow sheet : review HEP,    Rationale: increase ROM, increase strength, improve coordination, improve balance and increase proprioception to improve the patients ability to transfer sit to stand, walk, ascend/descend stairs w/out pain/limitation    10 min Manual Therapy:  Use IASM \"scoop\" technique L patellar tendon, infrapatellar fat pad mobilization, manual and w/ IASM STM/TPR L prox hip flexor tendon, man hip flexor stretch in prone 30 sec x 3     Rationale: decrease pain, increase ROM, increase tissue extensibility, decrease trigger points and increase postural awareness  to improve the patients ability to transfer sit to stand, walk, ascend/descend stairs w/out pain/limitation          With   [x] TE   [] TA   [] neuro   [] other: Patient Education: [x] Review HEP    [] Progressed/Changed HEP based on:   [x] positioning   [x] body mechanics   [] transfers   [x] heat/ice application    [] other:      Other Objective/Functional Measures:   nt     Pain Level (0-10 scale) post treatment: 0/10    ASSESSMENT/Changes in Function:     Pt tender L prox third patellar tendon, tender prox hip flexor tendon; trung ex well, cont to be very limited w/ hamstring flexibility L     Patient will continue to benefit from skilled PT services to modify and progress therapeutic interventions, address functional mobility deficits, address ROM deficits, address strength deficits, analyze and address soft tissue restrictions, analyze and cue movement patterns, analyze and modify body mechanics/ergonomics and assess and modify postural abnormalities to attain remaining goals.      [x]  See Plan of Care  []  See progress note/recertification  []  See Discharge Summary         Progress towards goals / Updated goals:  nt    PLAN  []  Upgrade activities as tolerated     [x]  Continue plan of care  []  Update interventions per flow sheet       []  Discharge due to:_  []  Other:_      Samson Duarte, PT 11/7/2017  2:08 PM

## 2017-11-14 ENCOUNTER — HOSPITAL ENCOUNTER (OUTPATIENT)
Dept: PHYSICAL THERAPY | Age: 37
Discharge: HOME | End: 2017-11-14
Payer: COMMERCIAL

## 2017-11-14 PROCEDURE — 97140 MANUAL THERAPY 1/> REGIONS: CPT | Performed by: PHYSICAL THERAPY ASSISTANT

## 2017-11-14 PROCEDURE — 97110 THERAPEUTIC EXERCISES: CPT | Performed by: PHYSICAL THERAPY ASSISTANT

## 2017-11-21 ENCOUNTER — HOSPITAL ENCOUNTER (OUTPATIENT)
Dept: PHYSICAL THERAPY | Age: 37
Discharge: HOME | End: 2017-11-21
Payer: COMMERCIAL

## 2017-11-21 PROCEDURE — 97110 THERAPEUTIC EXERCISES: CPT | Performed by: PHYSICAL THERAPY ASSISTANT

## 2017-11-21 NOTE — PROGRESS NOTES
PT DAILY TREATMENT NOTE 2-15    Patient Name: Mati Montes  Date:2017  : 1980  [x]  Patient  Verified  Payor: Namita Speedy / Plan: Anastasiia Dumont / Product Type: HMO /    In time: 2:10 PM Out time: 3:05 PM  Total Treatment Time (min): 55  Visit #: 4     Treatment Area: Left hip pain [M25.552]  Left leg pain [M79.605]     SUBJECTIVE  Pain Level (0-10 scale): 2/10  Any medication changes, allergies to medications, adverse drug reactions, diagnosis change, or new procedure performed?: [x] No    [] Yes (see summary sheet for update)  Subjective functional status/changes:   [] No changes reported  Pt reports\"The anti-inflammatories are helping. \" \"i'm seeing improvement, I can stretch it more. \"    OBJECTIVE    Modality rationale: decrease inflammation and decrease pain to improve the patients ability to transfer sit to stand, walk, ascend/descend stairs w/out pain/limitation   Min Type Additional Details    [] Estim: []Att   []Unatt        []TENS instruct                  []IFC  []Premod   []NMES                     []Other:  []w/US   []w/ice   []w/heat  Position:  Location:    []  Traction: [] Cervical       []Lumbar                       [] Prone          []Supine                       []Intermittent   []Continuous Lbs:  [] before manual  [] after manual  []w/heat    []  Ultrasound: []Continuous   [] Pulsed at:                            []1MHz   []3MHz Location:  W/cm2:    []  Paraffin         Location:  []w/heat   10 [x]  Ice     []  Heat  []  Ice massage Position: sup  Location: L knee, L prox hip flexor     []  Laser  []  Other: Position:  Location:    []  Vasopneumatic Device Pressure:       [] lo [] med [] hi   Temperature:    [x] Skin assessment post-treatment:  [x]intact []redness- no adverse reaction    []redness  adverse reaction:     45 min Therapeutic Exercise:  [x] See flow sheet :    Rationale: increase ROM, increase strength, improve coordination, improve balance and increase proprioception to improve the patients ability to transfer sit to stand, walk, ascend/descend stairs w/out pain/limitation    omit min Manual Therapy:  Use IASM \"scoop\" technique L patellar tendon, infrapatellar fat pad mobilization, manual and w/ IASM STM/TPR L prox hip flexor tendon, man hip flexor stretch in prone 30 sec x 3     Rationale: decrease pain, increase ROM, increase tissue extensibility, decrease trigger points and increase postural awareness  to improve the patients ability to transfer sit to stand, walk, ascend/descend stairs w/out pain/limitation          With   [x] TE   [] TA   [] neuro   [] other: Patient Education: [x] Review HEP    [] Progressed/Changed HEP based on:   [x] positioning   [x] body mechanics   [] transfers   [x] heat/ice application    [] other:      Other Objective/Functional Measures:   nt     Pain Level (0-10 scale) post treatment: 0/10 L knee, 4/10 low back    ASSESSMENT/Changes in Function:     Patient with increased low back pain at end of session but without reports of lumbar pain/discomfort during exercises. Patient will continue to benefit from skilled PT services to modify and progress therapeutic interventions, address functional mobility deficits, address ROM deficits, address strength deficits, analyze and address soft tissue restrictions, analyze and cue movement patterns, analyze and modify body mechanics/ergonomics and assess and modify postural abnormalities to attain remaining goals.      [x]  See Plan of Care  []  See progress note/recertification  []  See Discharge Summary         Progress towards goals / Updated goals:  nt    PLAN  []  Upgrade activities as tolerated     [x]  Continue plan of care  []  Update interventions per flow sheet       []  Discharge due to:_  []  Other:_      Jasson Blevins, WILDER 11/21/2017  2:10 PM

## 2017-11-27 ENCOUNTER — HOSPITAL ENCOUNTER (OUTPATIENT)
Dept: PHYSICAL THERAPY | Age: 37
Discharge: HOME | End: 2017-11-27
Payer: COMMERCIAL

## 2017-11-27 PROCEDURE — 97110 THERAPEUTIC EXERCISES: CPT

## 2017-11-27 NOTE — PROGRESS NOTES
PT DAILY TREATMENT NOTE 2-15    Patient Name: Gonzalo Sapp  Date:2017  : 1980  [x]  Patient  Verified  Payor: Sher Alamo / Plan: Rajan Keane / Product Type: HMO /    In time: 530 PM Out time: 625 PM  Total Treatment Time (min): 55  Visit #: 5     Treatment Area: Left hip pain [M25.552]  Left leg pain [M79.605]     SUBJECTIVE  Pain Level (0-10 scale): 3/10  Any medication changes, allergies to medications, adverse drug reactions, diagnosis change, or new procedure performed?: [x] No    [] Yes (see summary sheet for update)  Subjective functional status/changes:   [] No changes reported  Pt reports finished oral anti inflammatory last week and feels that symptoms \"same\"; reports experienced L side back pain while in bed last night, initially while in R s/l and persisted even in supine; decr pain today however had episode of painful \"crack\" when stood and extended spine but did not cont to hurt     OBJECTIVE    Modality rationale: decrease inflammation and decrease pain to improve the patients ability to transfer sit to stand, walk, ascend/descend stairs w/out pain/limitation   Min Type Additional Details    [] Estim: []Att   []Unatt        []TENS instruct                  []IFC  []Premod   []NMES                     []Other:  []w/US   []w/ice   []w/heat  Position:  Location:    []  Traction: [] Cervical       []Lumbar                       [] Prone          []Supine                       []Intermittent   []Continuous Lbs:  [] before manual  [] after manual  []w/heat    []  Ultrasound: []Continuous   [] Pulsed at:                            []1MHz   []3MHz Location:  W/cm2:    []  Paraffin         Location:  []w/heat   10 [x]  Ice     []  Heat  []  Ice massage Position: sup  Location: L knee, lumb spine      []  Laser  []  Other: Position:  Location:    []  Vasopneumatic Device Pressure:       [] lo [] med [] hi   Temperature:    [x] Skin assessment post-treatment:  [x]intact []redness- no adverse reaction    []redness  adverse reaction:     45 min Therapeutic Exercise:  [x] See flow sheet : modified ex due to lumb reg pain, add SKTC stretch    Rationale: increase ROM, increase strength, improve coordination, improve balance and increase proprioception to improve the patients ability to transfer sit to stand, walk, ascend/descend stairs w/out pain/limitation    omit min Manual Therapy:  Use IASM \"scoop\" technique L patellar tendon, infrapatellar fat pad mobilization, manual and w/ IASM STM/TPR L prox hip flexor tendon, man hip flexor stretch in prone 30 sec x 3     Rationale: decrease pain, increase ROM, increase tissue extensibility, decrease trigger points and increase postural awareness  to improve the patients ability to transfer sit to stand, walk, ascend/descend stairs w/out pain/limitation          With   [x] TE   [] TA   [] neuro   [] other: Patient Education: [x] Review HEP    [] Progressed/Changed HEP based on:   [x] positioning   [x] body mechanics   [] transfers   [x] heat/ice application    [] other:      Other Objective/Functional Measures:   nt     Pain Level (0-10 scale) post treatment: 0/10     ASSESSMENT/Changes in Function:     Modified treatment session today due to lumb reg pain, painful w/ AROM & AAROM lumb ext, improved trung w/ flex w/ SKTC stretch     Patient will continue to benefit from skilled PT services to modify and progress therapeutic interventions, address functional mobility deficits, address ROM deficits, address strength deficits, analyze and address soft tissue restrictions, analyze and cue movement patterns, analyze and modify body mechanics/ergonomics and assess and modify postural abnormalities to attain remaining goals.      [x]  See Plan of Care  []  See progress note/recertification  []  See Discharge Summary         Progress towards goals / Updated goals:  nt    PLAN  []  Upgrade activities as tolerated     [x]  Continue plan of care  []  Update interventions per flow sheet       []  Discharge due to:_  []  Other:_      [place], PT 11/27/2017  608 PM

## 2017-12-05 ENCOUNTER — HOSPITAL ENCOUNTER (OUTPATIENT)
Dept: PHYSICAL THERAPY | Age: 37
Discharge: HOME | End: 2017-12-05
Payer: COMMERCIAL

## 2017-12-05 PROCEDURE — 97110 THERAPEUTIC EXERCISES: CPT

## 2017-12-05 NOTE — PROGRESS NOTES
PT DAILY TREATMENT NOTE 2-15    Patient Name: Soha Sender  Date:2017  : 1980  [x]  Patient  Verified  Payor: Melva García / Plan: Karina Dawson / Product Type: HMO /    In time: 65 PM Out time: 640 PM  Total Treatment Time (min): 70  Visit #: 6    Treatment Area: Left hip pain [M25.552]  Left leg pain [M79.605]     SUBJECTIVE  Pain Level (0-10 scale): 2/10  Any medication changes, allergies to medications, adverse drug reactions, diagnosis change, or new procedure performed?: [x] No    [] Yes (see summary sheet for update)  Subjective functional status/changes:   [] No changes reported  Pt reports \"better\", has cont to use ice for back pain; 2/10 pain today in lumb reg; admits fair compliance w/ HEP due to working long hours    OBJECTIVE    Modality rationale: decrease inflammation and decrease pain to improve the patients ability to transfer sit to stand, walk, ascend/descend stairs w/out pain/limitation   Min Type Additional Details    [] Estim: []Att   []Unatt        []TENS instruct                  []IFC  []Premod   []NMES                     []Other:  []w/US   []w/ice   []w/heat  Position:  Location:    []  Traction: [] Cervical       []Lumbar                       [] Prone          []Supine                       []Intermittent   []Continuous Lbs:  [] before manual  [] after manual  []w/heat    []  Ultrasound: []Continuous   [] Pulsed at:                            []1MHz   []3MHz Location:  W/cm2:    []  Paraffin         Location:  []w/heat   10 [x]  Ice     []  Heat  []  Ice massage Position: sup  Location: L knee, lumb spine      []  Laser  []  Other: Position:  Location:    []  Vasopneumatic Device Pressure:       [] lo [] med [] hi   Temperature:    [x] Skin assessment post-treatment:  [x]intact []redness- no adverse reaction    []redness  adverse reaction:     60 min Therapeutic Exercise:  [x] See flow sheet : add standing abdom brace w/ row, bilat UE ext, bilat UE flex, mini squat, SAQ    Rationale: increase ROM, increase strength, improve coordination, improve balance and increase proprioception to improve the patients ability to transfer sit to stand, walk, ascend/descend stairs w/out pain/limitation    omit min Manual Therapy:  Use IASM \"scoop\" technique L patellar tendon, infrapatellar fat pad mobilization, manual and w/ IASM STM/TPR L prox hip flexor tendon, man hip flexor stretch in prone 30 sec x 3     Rationale: decrease pain, increase ROM, increase tissue extensibility, decrease trigger points and increase postural awareness  to improve the patients ability to transfer sit to stand, walk, ascend/descend stairs w/out pain/limitation          With   [x] TE   [] TA   [] neuro   [] other: Patient Education: [x] Review HEP    [] Progressed/Changed HEP based on:   [x] positioning   [x] body mechanics   [] transfers   [x] heat/ice application    [] other:      Other Objective/Functional Measures:   nt     Pain Level (0-10 scale) post treatment: 2/10     ASSESSMENT/Changes in Function:     Attempted SLR L LE today, pt experiencing discomfort prox hip & min muscle fatigue L quad, able to [employer] w/ appropriate muscle and no hip pain; pain L knee w/ squat, able to avoid w/ modification of technique and limiting depth of squat     Patient will continue to benefit from skilled PT services to modify and progress therapeutic interventions, address functional mobility deficits, address ROM deficits, address strength deficits, analyze and address soft tissue restrictions, analyze and cue movement patterns, analyze and modify body mechanics/ergonomics and assess and modify postural abnormalities to attain remaining goals.      [x]  See Plan of Care  []  See progress note/recertification  []  See Discharge Summary         Progress towards goals / Updated goals:  nt    PLAN  []  Upgrade activities as tolerated     [x]  Continue plan of care  []  Update interventions per flow sheet       [] Discharge due to:_  []  Other:_      Michael Crump, PT 12/5/2017  644 PM

## 2017-12-06 ENCOUNTER — APPOINTMENT (OUTPATIENT)
Dept: PHYSICAL THERAPY | Age: 37
End: 2017-12-06
Payer: COMMERCIAL

## 2017-12-11 ENCOUNTER — OFFICE VISIT (OUTPATIENT)
Dept: INTERNAL MEDICINE CLINIC | Age: 37
End: 2017-12-11

## 2017-12-11 VITALS
HEART RATE: 90 BPM | OXYGEN SATURATION: 98 % | SYSTOLIC BLOOD PRESSURE: 126 MMHG | DIASTOLIC BLOOD PRESSURE: 88 MMHG | WEIGHT: 302.8 LBS | RESPIRATION RATE: 18 BRPM | TEMPERATURE: 99 F | HEIGHT: 67 IN | BODY MASS INDEX: 47.53 KG/M2

## 2017-12-11 DIAGNOSIS — G47.33 OSA (OBSTRUCTIVE SLEEP APNEA): Primary | ICD-10-CM

## 2017-12-11 DIAGNOSIS — E66.01 MORBIDLY OBESE (HCC): ICD-10-CM

## 2017-12-11 DIAGNOSIS — R41.840 ATTENTION AND CONCENTRATION DEFICIT: ICD-10-CM

## 2017-12-11 DIAGNOSIS — F34.1 DYSTHYMIA: ICD-10-CM

## 2017-12-11 RX ORDER — BUPROPION HYDROCHLORIDE 75 MG/1
75 TABLET ORAL 2 TIMES DAILY
Qty: 60 TAB | Refills: 0 | Status: SHIPPED | OUTPATIENT
Start: 2017-12-11 | End: 2017-12-27 | Stop reason: ALTCHOICE

## 2017-12-11 RX ORDER — DEXTROAMPHETAMINE SACCHARATE, AMPHETAMINE ASPARTATE MONOHYDRATE, DEXTROAMPHETAMINE SULFATE AND AMPHETAMINE SULFATE 2.5; 2.5; 2.5; 2.5 MG/1; MG/1; MG/1; MG/1
10 CAPSULE, EXTENDED RELEASE ORAL
Qty: 30 CAP | Refills: 0 | Status: SHIPPED | OUTPATIENT
Start: 2017-12-11 | End: 2017-12-27 | Stop reason: ALTCHOICE

## 2017-12-11 RX ORDER — NALTREXONE HYDROCHLORIDE 50 MG/1
50 TABLET, FILM COATED ORAL DAILY
Qty: 30 TAB | Refills: 0 | Status: SHIPPED | OUTPATIENT
Start: 2017-12-11 | End: 2018-01-10

## 2017-12-11 NOTE — PROGRESS NOTES
Pt here to be seen to discuss neuro psych testing results, and medication evaluation. He also has c/o of left knee pain that has been occurring since the summer, which has not improved. Chief Complaint   Patient presents with    Results     neuro psych results/medication    Knee Pain     left knee      Visit Vitals    /88 (BP 1 Location: Right arm, BP Patient Position: Sitting)    Pulse 90    Temp 99 °F (37.2 °C) (Oral)    Resp 18    Ht 5' 7\" (1.702 m)    Wt 302 lb 12.8 oz (137.3 kg)    SpO2 98%    BMI 47.43 kg/m2     1. Have you been to the ER, urgent care clinic since your last visit? Hospitalized since your last visit? No    2. Have you seen or consulted any other health care providers outside of the [address] [place] [address] since your last visit? Include any pap smears or colon screening.  No

## 2017-12-11 NOTE — MR AVS SNAPSHOT
[address] [facility] [address] 
[phone] Patient: Pauline Stover MRN: [id number] KNP:[id number] Visit Information Date & Time Provider Department Dept. Phone Encounter #  
 12/11/2017 11:30 AM Chyna Cabello, [address] [facility],[address] Internal Medicine [phone] [id number] Upcoming Health Maintenance Date Due DTaP/Tdap/Td series (3 - Td) 4/12/2021 Allergies as of 12/11/2017  Review Complete On: 12/11/2017 By: Chyna Cabello MD  
  
 Severity Noted Reaction Type Reactions Sulfa (Sulfonamide Antibiotics)  11/11/2009    Nausea and Vomiting Current Immunizations  Reviewed on 4/12/2011 Name Date  
 TD Vaccine 1/1/1998 TDAP Vaccine 4/12/2011 Not reviewed this visit You Were Diagnosed With   
  
 Codes Comments LASHAUN (obstructive sleep apnea)    -  Primary ICD-10-CM: G47.33 
ICD-9-CM: 327.23 Dysthymia     ICD-10-CM: F34.1 ICD-9-CM: 300.4 Attention and concentration deficit     ICD-10-CM: R41.840 ICD-9-CM: 799.51 Morbidly obese (HCC)     ICD-10-CM: E66.01 
ICD-9-CM: 278.01 Vitals BP Pulse Temp Resp Height(growth percentile) Weight(growth percentile) 126/88 (BP 1 Location: Right arm, BP Patient Position: Sitting) 90 99 °F (37.2 °C) (Oral) 18 5' 7\" (1.702 m) 302 lb 12.8 oz (137.3 kg) SpO2 BMI Smoking Status 98% 47.43 kg/m2 Never Smoker Vitals History BMI and BSA Data Body Mass Index Body Surface Area  
 47.43 kg/m 2 2.55 m 2 Preferred Pharmacy Pharmacy Name Phone [address], [facility] [address] Your Updated Medication List  
  
   
This list is accurate as of: 12/11/17 12:03 PM.  Always use your most recent med list.  
  
  
  
  
 buPROPion 75 mg tablet Commonly known as:  [facility] Take 1 Tab by mouth two (2) times a day for 30 days. * dextroamphetamine-amphetamine 10 mg tablet Commonly known as:  ADDERALL Take 1 Tab (10 mg total) by mouth dailyEarliest Fill Date: 11/17/17. Max Daily Amount: 10 mg  
  
 * amphetamine-dextroamphetamine XR 10 mg XR capsule Commonly known as:  ADDERALL XR Take 1 Cap (10 mg total) by mouth every morning. Max Daily Amount: 10 mg  
  
 naltrexone 50 mg tablet Commonly known as:  DEPADE Take 1 Tab by mouth daily for 30 days. * Notice: This list has 2 medication(s) that are the same as other medications prescribed for you. Read the directions carefully, and ask your doctor or other care provider to review them with you. Prescriptions Printed Refills  
 amphetamine-dextroamphetamine XR (ADDERALL XR) 10 mg XR capsule 0 Sig: Take 1 Cap (10 mg total) by mouth every morning. Max Daily Amount: 10 mg  
 Class: Print Route: Oral  
  
Prescriptions Sent to Pharmacy Refills buPROPion (WELLBUTRIN) 75 mg tablet 0 Sig: Take 1 Tab by mouth two (2) times a day for 30 days. Class: Normal  
 Pharmacy: [address] [place] [address] Ph #: [phone] Route: Oral  
 naltrexone (DEPADE) 50 mg tablet 0 Sig: Take 1 Tab by mouth daily for 30 days. Class: Normal  
 Pharmacy: [address] [place] [address] Ph #: [phone] Route: Oral  
  
Introducing [facility]! Dear Yaw Barcenas: Thank you for requesting a [facility] account. Our records indicate that you already have an active [facility] account. You can access your account anytime at [email or web address] Did you know that you can access your hospital and ER discharge instructions at any time in [facility]? You can also review all of your test results from your hospital stay or ER visit. Additional Information If you have questions, please visit the Frequently Asked Questions section of the [facility] website at [email or web address] Remember, [facility] is NOT to be used for urgent needs. For medical emergencies, dial 911. Now available from your iPhone and Android! Please provide this summary of care documentation to your next provider. Your primary care clinician is listed as Elin Sanders. If you have any questions after today's visit, please call [phone].

## 2017-12-11 NOTE — PROGRESS NOTES
Written by Mónica Miranda, as dictated by Dr. Cary Ac MD.  [facility]   Debbie Ponce is a 40 y.o. male who comes in for medication evaluation and review of recent psych tests he had with Dr. Madiha Venegas (Psychology). He did not stop taking the Adderall before his testing and advised him that it could affect his results. The results came back stating that he did not have ADHD. He states that the Adderall helps him a lot with his focus at work. Pt states that Dr. Madiha Venegas told him that he could continue taking the Adderall for the test. He states that at work he cannot complete things and seems all over the place. He continues to use his CPAP machine regularly that he states used to help a lot with his fatigue. Pt states that he now begins to get tired in the afternoon and wakes up a lot during the night. Pt states that he saw Dr. Bernarda Shelton (Sleep Medicine) initially for CPAP machine. He states that he will get tired and sleepy if he is not actively moving or doing work at all times during the day. He states that his last Adderall he took was this morning. He states that he still has some left knee pain. He continues to do his exercises and stretches, but the pain has not improved. He states that his physical therapist thinks it has to do with his back. He states that the holidays caused him to gain weight recently and not being as active. Patient Active Problem List   Diagnosis Code    Hyperlipemia E78.5    Acne vulgaris L70.0    Depression F32.9    Chronic nonintractable headache R51    Obesity, morbid ([facility] E66.01    Attention and concentration deficit R41.840        Current Outpatient Prescriptions on File Prior to Visit   Medication Sig Dispense Refill    dextroamphetamine-amphetamine (ADDERALL) 10 mg tablet Take 1 Tab (10 mg total) by mouth dailyEarliest Fill Date: 11/17/17.   Max Daily Amount: 10 mg 30 Tab 0     No current facility-administered medications on file prior to visit. Allergies   Allergen Reactions    Sulfa (Sulfonamide Antibiotics) Nausea and Vomiting       Past Medical History:   Diagnosis Date    Mixed hyperlipidemia     Obesity     Persistent disorder of initiating or maintaining sleep     Sleep apnea        Past Surgical History:   Procedure Laterality Date    HX ORTHOPAEDIC  '05    rt. shoulder - \"slap\" injury       Family History   Problem Relation Age of Onset    Cancer Father      colon    Asthma Sister        Social History     Social History    Marital status: SINGLE     Spouse name: Single    Number of children: 0    Years of education: N/A     Occupational History    IT      Social History Main Topics    Smoking status: Never Smoker    Smokeless tobacco: Never Used    Alcohol use 1.5 oz/week     3 Standard drinks or equivalent per week    Drug use: No    Sexual activity: Yes     Partners: Female     Other Topics Concern    Not on file     Social History Narrative         Review of Systems   Constitutional: Positive for malaise/fatigue. HENT: Negative for congestion. Eyes: Negative for blurred vision and pain. Respiratory: Negative for cough and shortness of breath. Cardiovascular: Negative for chest pain and palpitations. Gastrointestinal: Negative for abdominal pain and heartburn. Genitourinary: Negative for frequency and urgency. Musculoskeletal: Negative for joint pain and myalgias. Neurological: Negative for dizziness, tingling, sensory change, weakness and headaches. Psychiatric/Behavioral: Negative for memory loss and substance abuse. Visit Vitals    /88 (BP 1 Location: Right arm, BP Patient Position: Sitting)    Pulse 90    Temp 99 °F (37.2 °C) (Oral)    Resp 18    Ht 5' 7\" (1.702 m)    Wt 302 lb 12.8 oz (137.3 kg)    SpO2 98%    BMI 47.43 kg/m2     Physical Exam   Constitutional: He is oriented to person, place, and time. No distress.    Morbidly obese   HENT:   Right Ear: External ear normal.   Left Ear: External ear normal.   Eyes: Conjunctivae and EOM are normal. Right eye exhibits no discharge. Left eye exhibits no discharge. Neck: Normal range of motion. Neck supple. Cardiovascular: Normal rate and regular rhythm. Pulmonary/Chest: Effort normal and breath sounds normal. He has no wheezes. Abdominal: Soft. Bowel sounds are normal. There is no tenderness. Lymphadenopathy:     He has no cervical adenopathy. Neurological: He is alert and oriented to person, place, and time. Skin: He is not diaphoretic. Psychiatric: He has a normal mood and affect. His behavior is normal.   Nursing note and vitals reviewed. ASSESSMENT and PLAN    ICD-10-CM ICD-9-CM    1. LASHAUN (obstructive sleep apnea) G47.33 327.23 Advised pt to follow up with Dr. Roz Vogel about possibly adjusting his CPAP machine. Advised pt to ask about the medication Nuvagil. 2. Dysthymia F34.1 300.4 buPROPion (WELLBUTRIN) 75 mg tablet      naltrexone (DEPADE) 50 mg tablet. Contrave not covered by insurance. I suggested pt start on Wellbutrin and Naltrexone for mood elevation and fatigue. Advised pt to gradually start on the Wellbutrin and start with just 1 tablet a day for a couple of days and then increase to 2 a day. Will follow up in 1 month to see if the medication is working. 3. Attention and concentration deficit R41.840 799.51 Result from Dr. Sydni Armando office reviewed. Told him he should have  not taken medication before the test.        Will give pt one more prescription for Adderall XR to last longer so that he is not feeling fatigued at 11 am.   4. Morbidly obese ([address] E66.01 278.01 Advised pt to watch his diet and incorporate more daily exercise. Advised pt to try and lose 20-30lbs before next visit to see if his symptoms improve. This plan was reviewed with the patient and patient agrees. All questions were answered.     This scribe documentation was reviewed by me and accurately reflects the examination

## 2017-12-19 ENCOUNTER — HOSPITAL ENCOUNTER (OUTPATIENT)
Dept: PHYSICAL THERAPY | Age: 37
Discharge: HOME | End: 2017-12-19
Payer: COMMERCIAL

## 2017-12-19 PROCEDURE — 97110 THERAPEUTIC EXERCISES: CPT

## 2017-12-19 NOTE — PROGRESS NOTES
PT DAILY TREATMENT NOTE 2-15    Patient Name: Jatinder Machado  Date:2017  : 1980  [x]  Patient  Verified  Payor: Darío Segura / Plan: Sidney Roth / Product Type: HMO /    In time: 440 PM Out time:  0 PM  Total Treatment Time (min): 65  Visit #: 8    Treatment Area: Left hip pain [M25.552]  Left leg pain [M79.605]     SUBJECTIVE  Pain Level (0-10 scale): 310  Any medication changes, allergies to medications, adverse drug reactions, diagnosis change, or new procedure performed?: [x] No    [] Yes (see summary sheet for update)  Subjective functional status/changes:   [] No changes reported  Pt reports \"about the same\", reports decr hip pain but cont pain in L knee and low back; has had incr L knee pain w/ attempts to do minisquat exercise at home; reports cont knee pain w/ stairs and then w/ walking after stairs; cont freq low back pain     OBJECTIVE    Modality rationale: decrease inflammation and decrease pain to improve the patients ability to transfer sit to stand, walk, ascend/descend stairs w/out pain/limitation   Min Type Additional Details    [] Estim: []Att   []Unatt        []TENS instruct                  []IFC  []Premod   []NMES                     []Other:  []w/US   []w/ice   []w/heat  Position:  Location:    []  Traction: [] Cervical       []Lumbar                       [] Prone          []Supine                       []Intermittent   []Continuous Lbs:  [] before manual  [] after manual  []w/heat    []  Ultrasound: []Continuous   [] Pulsed at:                            []1MHz   []3MHz Location:  W/cm2:    []  Paraffin         Location:  []w/heat   10 [x]  Ice     []  Heat  []  Ice massage Position: sup  Location: L knee, lumb spine      []  Laser  []  Other: Position:  Location:    []  Vasopneumatic Device Pressure:       [] lo [] med [] hi   Temperature:    [x] Skin assessment post-treatment:  [x]intact []redness- no adverse reaction    []redness  adverse reaction:     55 min Therapeutic Exercise:  [x] See flow sheet : reassessment performed, review recommendations for independent program     Rationale: increase ROM, increase strength, improve coordination, improve balance and increase proprioception to improve the patients ability to transfer sit to stand, walk, ascend/descend stairs w/out pain/limitation    omit min Manual Therapy:  Use IASM \"scoop\" technique L patellar tendon, infrapatellar fat pad mobilization, manual and w/ IASM STM/TPR L prox hip flexor tendon, man hip flexor stretch in prone 30 sec x 3     Rationale: decrease pain, increase ROM, increase tissue extensibility, decrease trigger points and increase postural awareness  to improve the patients ability to transfer sit to stand, walk, ascend/descend stairs w/out pain/limitation          With   [x] TE   [] TA   [] neuro   [] other: Patient Education: [x] Review HEP    [] Progressed/Changed HEP based on:   [x] positioning   [x] body mechanics   [] transfers   [x] heat/ice application    [x] other:  regarding healthy eating habits, lumbar support while sitting as well as avoid sitting >30 minutes, discussed TENS unit for pain management.      Other Objective/Functional Measures:   Observation:   Pt is obese      Movement/gait:   bilat toe out, decr step length, decr trunk rot                                       ROM:  [] Unable to assess                 AAROM                                             Right Left   Hip Flexion wnl wnl     Extension hypo hypo     Abduction wnl wnl     Adduction wnl wnl     IR wnl wnl     ER hypo Hypo    Knee Flexion wnl wnl     Extension hyper hyper      LUMB AROM wnl all motions, pain w/ ext     Strength:  [] Unable to assess      Right Left   Hip Flexion 5 4      Extension nt nt     Abduction 4 4     Adduction nt nt     IR nt nt     ER 5 4   Knee Flexion 5 4     extension  5 4 pain    Ankle DF 5 4      SLR R 5/5 L 4/5  QS wnl/= bilat   abdom brace good seq fair endur  Bridge fair     Neurosensory: sensation grossly intact to light touch bilat      Palpation:   Tender w/ palpation L prox hip flexor tendon, L patellar tendon, L knee med joint line, L4-5 S1 SP & w/ pressure at these levels & surrounding paraspinal mm.       Functional Biomechanical Screen  SLS: 10 sec bilat, > difficulty stance L   Bilateral Squat: good mobility, trunk flex, pain L knee      Special Tests:  90/90 test + bilat L>R, neural tension L>R   Ely's test +  bilat L>R  Hip scour - bilat                           Outcome Measure: Patient presents with FOTO score 52/100 (initial FOTO score 37/100)       Pain Level (0-10 scale) post treatment: 2/10     ASSESSMENT/Changes in Function:     Patient presents w/ incr pain free lumb & hip ROM, decr report of L hip pain, incr core m. Activation/strength, incr FOTO score from 37 to 52 out of 100 which indicates increased function however pt is w/ cont report of pain in L knee and lumb spine; he cont to have signif weakness in his L LE which feel may be attributed to lumbar spine; at this time advised pt to discontinue squat at home as this seems to be aggravating L knee sx; initially pt w/ poor compliance w/ ex, postural & activity recommendations which has improved in last couple weeks but has slowed ability to progress exercises and may interfere w/ sx management     PT is to return to MD to discuss reaching plateau and POC     Patient will continue to benefit from skilled PT services to modify and progress therapeutic interventions, address functional mobility deficits, address ROM deficits, address strength deficits, analyze and address soft tissue restrictions, analyze and cue movement patterns, analyze and modify body mechanics/ergonomics and assess and modify postural abnormalities to attain remaining goals. [x]  See Plan of Care  []  See progress note/recertification  []  See Discharge Summary         Progress towards goals / Updated goals:  Short Term Goals:  To be accomplished in 1-2 weeks:  1) Pt will be independent with HEP met  2) Pt will demonstrate understanding/application of postural principles and recommendations met  3) Pt will report >/= 25% decrease in symptoms partially met, decr L hip pain, knee & lumb spine min change  4) Pt will demonstrate proper abdominal brace sequencing and ability to hold >/= 10 seconds for improved core strengthmet   Long Term Goals:  To be accomplished in 6-8 weeks:  1) Pt will report >/= 75% decrease in symptoms not met   2) Pt will report ascend/descend stairs without painnot met    3) Pt will be able to ambulate greater than/= 15 min without increase of pain not met   4) Pt will demonstrate independence with modified exercise/gym routine without aggravation of sx.not met   5) Pt will report sit to stand transfer w/out incr pain met6) Pt will be able to perform 10 consecutive bridge w/ march w/out hip drop to demonstrate improved core strength not met     PLAN  []  Upgrade activities as tolerated     [x]  Continue plan of care  []  Update interventions per flow sheet       []  Discharge due to:_  [x]  Other:  Pt seems to have reached a plateau, to f/u w/ MD to discuss [address], PT 12/19/2017  5:30 PM

## 2017-12-20 NOTE — PROGRESS NOTES
MD NOTE 2-15    Patient Name: Eduin Lundberg  Date:2017  : 1980  Visit #: 8    Treatment Area: Left hip pain [M25.552]  Left leg pain [M82.605]     SUBJECTIVE  Pain Level (0-10 scale): 3/10    Pt reports \"about the same\", reports decr hip pain but cont pain in L knee and low back; has had incr L knee pain w/ attempts to do minisquat exercise at home; reports cont knee pain w/ stairs and then w/ walking after stairs; cont freq low back pain     OBJECTIVE    Observation:   Pt is obese      Movement/gait:   bilat toe out, decr step length, decr trunk rot                                       ROM:  [] Unable to assess                 AAROM                                             Right Left   Hip Flexion wnl wnl     Extension hypo hypo     Abduction wnl wnl     Adduction wnl wnl     IR wnl wnl     ER hypo Hypo    Knee Flexion wnl wnl     Extension hyper hyper      LUMB AROM wnl all motions, pain w/ ext     Strength:  [] Unable to assess      Right Left   Hip Flexion 5 4      Extension nt nt     Abduction 4 4     Adduction nt nt     IR nt nt     ER 5 4   Knee Flexion 5 4     extension  5 4 pain    Ankle DF 5 4      SLR R 5/5 L 4/5  QS wnl/= bilat   abdom brace good seq fair endur  Bridge fair     Neurosensory: sensation grossly intact to light touch bilat      Palpation:   Tender w/ palpation L prox hip flexor tendon, L patellar tendon, L knee med joint line, L4-5 S1 SP & w/ pressure at these levels & surrounding paraspinal mm.       Functional Biomechanical Screen  SLS: 10 sec bilat, > difficulty stance L   Bilateral Squat: good mobility, trunk flex, pain L knee      Special Tests:  90/90 test + bilat L>R, neural tension L>R   Ely's test +  bilat L>R  Hip scour - bilat                           Outcome Measure: Patient presents with FOTO score 52/100 (initial FOTO score 37/100)     Pain Level (0-10 scale) post treatment: 2/10     ASSESSMENT/Changes in Function:     Patient presents w/ incr pain free lumb & hip ROM, decr report of L hip pain, incr core m. Activation/strength, incr FOTO score from 37 to 52 out of 100 which indicates increased function however pt is w/ cont report of pain in L knee and lumb spine; he cont to have signif weakness in his L LE which feel may be attributed to lumbar spine; at this time advised pt to discontinue squat at home as this seems to be aggravating L knee sx; initially pt w/ poor compliance w/ ex, postural & activity recommendations which has improved in last couple weeks but has slowed ability to progress exercises and may interfere w/ sx management     PT is to return to MD to discuss reaching plateau and POC            Progress towards goals / Updated goals:  Short Term Goals: To be accomplished in 1-2 weeks:  1) Pt will be independent with HEP met  2) Pt will demonstrate understanding/application of postural principles and recommendations met  3) Pt will report >/= 25% decrease in symptoms partially met, decr L hip pain, knee & lumb spine min change  4) Pt will demonstrate proper abdominal brace sequencing and ability to hold >/= 10 seconds for improved core strengthmet   Long Term Goals:  To be accomplished in 6-8 weeks:  1) Pt will report >/= 75% decrease in symptoms not met   2) Pt will report ascend/descend stairs without painnot met    3) Pt will be able to ambulate greater than/= 15 min without increase of pain not met   4) Pt will demonstrate independence with modified exercise/gym routine without aggravation of sx.not met   5) Pt will report sit to stand transfer w/out incr pain met6) Pt will be able to perform 10 consecutive bridge w/ march w/out hip drop to demonstrate improved core strength not met     PLAN  []  Upgrade activities as tolerated     [x]  Continue plan of care  []  Update interventions per flow sheet       []  Discharge due to:_  [x]  Other:  Pt seems to have reached a plateau, to f/u w/ MD to discuss [address], PT 12/19/2017  5:30 PM

## 2017-12-21 ENCOUNTER — APPOINTMENT (OUTPATIENT)
Dept: PHYSICAL THERAPY | Age: 37
End: 2017-12-21
Payer: COMMERCIAL

## 2017-12-27 ENCOUNTER — OFFICE VISIT (OUTPATIENT)
Dept: INTERNAL MEDICINE CLINIC | Age: 37
End: 2017-12-27

## 2017-12-27 VITALS
BODY MASS INDEX: 47.78 KG/M2 | RESPIRATION RATE: 16 BRPM | TEMPERATURE: 99.3 F | OXYGEN SATURATION: 96 % | WEIGHT: 304.4 LBS | HEART RATE: 93 BPM | DIASTOLIC BLOOD PRESSURE: 82 MMHG | SYSTOLIC BLOOD PRESSURE: 138 MMHG | HEIGHT: 67 IN

## 2017-12-27 DIAGNOSIS — G47.33 OSA ON CPAP: ICD-10-CM

## 2017-12-27 DIAGNOSIS — F34.1 DYSTHYMIA: ICD-10-CM

## 2017-12-27 DIAGNOSIS — Z99.89 OSA ON CPAP: ICD-10-CM

## 2017-12-27 DIAGNOSIS — G89.29 CHRONIC LEFT HIP PAIN: ICD-10-CM

## 2017-12-27 DIAGNOSIS — G47.419 NARCOLEPSY WITHOUT CATAPLEXY: Primary | ICD-10-CM

## 2017-12-27 DIAGNOSIS — E66.01 MORBIDLY OBESE (HCC): ICD-10-CM

## 2017-12-27 DIAGNOSIS — M25.552 CHRONIC LEFT HIP PAIN: ICD-10-CM

## 2017-12-27 PROBLEM — F33.9 RECURRENT DEPRESSION (HCC): Status: ACTIVE | Noted: 2017-12-27

## 2017-12-27 RX ORDER — DEXMETHYLPHENIDATE HYDROCHLORIDE 10 MG/1
10 CAPSULE, EXTENDED RELEASE ORAL
Qty: 30 CAP | Refills: 0 | Status: SHIPPED | OUTPATIENT
Start: 2017-12-27 | End: 2018-02-09 | Stop reason: SDUPTHER

## 2017-12-27 RX ORDER — BUPROPION HYDROCHLORIDE 100 MG/1
100 TABLET ORAL 2 TIMES DAILY
Qty: 60 TAB | Refills: 0 | Status: SHIPPED | OUTPATIENT
Start: 2017-12-27 | End: 2018-02-09 | Stop reason: SDUPTHER

## 2017-12-27 NOTE — MR AVS SNAPSHOT
Visit Information Date & Time Provider Department Dept. Phone Encounter #  
 12/27/2017 12:15 PM Brett Cabrera MD [facility] Internal Medicine [phone] [id number] Your Appointments 4/3/2018  3:40 PM  
Any with Jono Banks MD  
[address] [facility] ([address]) Appt Note: Follow up for issues with nasal pillow mask, last seen Oct 2017 Karlos Samm [address] [id number] Upcoming Health Maintenance Date Due DTaP/Tdap/Td series (3 - Td) 4/12/2021 Allergies as of 12/27/2017  Review Complete On: 12/27/2017 By: Brett Cabrera MD  
  
 Severity Noted Reaction Type Reactions Sulfa (Sulfonamide Antibiotics)  11/11/2009    Nausea and Vomiting Current Immunizations  Reviewed on 4/12/2011 Name Date  
 TD Vaccine 1/1/1998 TDAP Vaccine 4/12/2011 Not reviewed this visit You Were Diagnosed With   
  
 Codes Comments Narcolepsy without cataplexy    -  Primary ICD-10-CM: K63.927 ICD-9-CM: 347.00 Dysthymia     ICD-10-CM: F34.1 ICD-9-CM: 300.4 Morbidly obese (HCC)     ICD-10-CM: E66.01 
ICD-9-CM: 278.01   
 LASHAUN on CPAP     ICD-10-CM: G47.33, Z99.89 ICD-9-CM: 327.23, V46.8 Chronic left hip pain     ICD-10-CM: M25.552, G89.29 ICD-9-CM: 719.45, 338.29 Vitals BP Pulse Temp Resp Height(growth percentile) Weight(growth percentile) 138/82 (BP 1 Location: Right arm, BP Patient Position: Sitting) 93 99.3 °F (37.4 °C) (Oral) 16 5' 7\" (1.702 m) 304 lb 6.4 oz (138.1 kg) SpO2 BMI Smoking Status 96% 47.68 kg/m2 Never Smoker BMI and BSA Data Body Mass Index Body Surface Area  
 47.68 kg/m 2 2.56 m 2 Preferred Pharmacy Pharmacy Name Phone [address], [address] Your Updated Medication List  
  
   
 This list is accurate as of: 12/27/17 12:54 PM.  Always use your most recent med list.  
  
  
  
  
 buPROPion 100 mg tablet Commonly known as:  [facility] Take 1 Tab by mouth two (2) times a day for 30 days. dexmethylphenidate 10 mg ER Capsule Commonly known as:  FOCALIN XR Take 1 Cap (10 mg total) by mouth every morning. Max Daily Amount: 10 mg  
  
 naltrexone 50 mg tablet Commonly known as:  DEPADE Take 1 Tab by mouth daily for 30 days. Prescriptions Printed Refills  
 dexmethylphenidate (FOCALIN XR) 10 mg ER Capsule 0 Sig: Take 1 Cap (10 mg total) by mouth every morning. Max Daily Amount: 10 mg  
 Class: Print Route: Oral  
  
Prescriptions Sent to Pharmacy Refills buPROPion (WELLBUTRIN) 100 mg tablet 0 Sig: Take 1 Tab by mouth two (2) times a day for 30 days. Class: Normal  
 Pharmacy: [facility], [address] [place] [address] #: [phone] Route: Oral  
  
To-Do List   
 12/27/2017 Imaging:  MRI LUMB SPINE W WO CONT Referral Information Referral ID Referred By Referred To  
  
 [id number] Griselda Gilliam Not Available Visits Status Start Date End Date 1 New Request 12/27/17 12/27/18 If your referral has a status of pending review or denied, additional information will be sent to support the outcome of this decision. Introducing [facility]! Dear Melly Carrion: Thank you for requesting a [employer] account. Our records indicate that you already have an active [employer] account. You can access your account anytime at [email or web address] Did you know that you can access your hospital and ER discharge instructions at any time in [employer]? You can also review all of your test results from your hospital stay or ER visit. Additional Information If you have questions, please visit the Frequently Asked Questions section of the [employer] website at [email or web address] Remember, [employer] is NOT to be used for urgent needs. For medical emergencies, dial 911. Now available from your iPhone and Android! Please provide this summary of care documentation to your next provider. Your primary care clinician is listed as Lisa Coughlin. If you have any questions after today's visit, please call [phone].

## 2017-12-27 NOTE — PROGRESS NOTES
Chief Complaint   Patient presents with    Other     patient states that he went to physical therapy and pt recommended that he discuss having a MRI

## 2017-12-27 NOTE — PROGRESS NOTES
Written by Christian Stewart, as dictated by Dr. Digna Rodgers MD.    Francisco Leno is a 40 y.o. male. HPI  The patient comes in today for a follow-up. He has been going to PT 3-5 days per week for his L hip and knee pain, and has been doing the exercises at home (except for weekends when he does not do them as regularly). The program originally wanted him to have PT every day. He has been taking Adderall XR. He did notice that on days when he takes it when he did not have anything to do, he falls asleep for most of the day. Recall that he had neuropsych testing with Dr. Marin Granger, who said he did not have ADHD, though he had been taking Adderall on the day he was tested. He is concerned about his increase sleepiness & not able to stay focus at work. He has an appointment with sleep medicine in April 2018. He has been taking Wellbutrin 75 mg BID and has felt less depressed. He has not lost any weight, however, and has gained 2 lbs since his last visit on 12/11, and has been gaining weight slowly for a while. He thinks recent weight gain is due to [place] season. Patient Active Problem List   Diagnosis Code    Hyperlipemia E78.5    Acne vulgaris L70.0    Depression F32.9    Chronic nonintractable headache R51    Obesity, morbid ([facility] [address] E66.01    Attention and concentration deficit R41.840        Current Outpatient Prescriptions on File Prior to Visit   Medication Sig Dispense Refill    buPROPion (WELLBUTRIN) 75 mg tablet Take 1 Tab by mouth two (2) times a day for 30 days. 60 Tab 0    naltrexone (DEPADE) 50 mg tablet Take 1 Tab by mouth daily for 30 days. 30 Tab 0    amphetamine-dextroamphetamine XR (ADDERALL XR) 10 mg XR capsule Take 1 Cap (10 mg total) by mouth every morning. Max Daily Amount: 10 mg 30 Cap 0     No current facility-administered medications on file prior to visit.         Allergies   Allergen Reactions    Sulfa (Sulfonamide Antibiotics) Nausea and Vomiting       Past Medical History:   Diagnosis Date    Mixed hyperlipidemia     Obesity     Persistent disorder of initiating or maintaining sleep     Sleep apnea        Past Surgical History:   Procedure Laterality Date    HX ORTHOPAEDIC  '05    rt. shoulder - \"slap\" injury       Family History   Problem Relation Age of Onset    Cancer Father      colon    Asthma Sister        Social History     Social History    Marital status: SINGLE     Spouse name: Single    Number of children: 0    Years of education: N/A     Occupational History    IT      Social History Main Topics    Smoking status: Never Smoker    Smokeless tobacco: Never Used    Alcohol use 1.5 oz/week     3 Standard drinks or equivalent per week    Drug use: No    Sexual activity: Yes     Partners: Female     Other Topics Concern    Not on file     Social History Narrative       Review of Systems   Constitutional: Positive for malaise/fatigue. HENT: Negative for congestion. Respiratory: Negative for cough and shortness of breath. Musculoskeletal: Positive for joint pain. Negative for myalgias. Neurological: Negative for weakness. Psychiatric/Behavioral: Negative for depression, memory loss and substance abuse. Visit Vitals    /82 (BP 1 Location: Right arm, BP Patient Position: Sitting)    Pulse 93    Temp 99.3 °F (37.4 °C) (Oral)    Resp 16    Ht 5' 7\" (1.702 m)    Wt 304 lb 6.4 oz (138.1 kg)    SpO2 96%    BMI 47.68 kg/m2       Physical Exam   Constitutional: He is oriented to person, place, and time. He appears well-developed. No distress. Morbidly obese   HENT:   Right Ear: External ear normal.   Left Ear: External ear normal.   Eyes: Conjunctivae and EOM are normal. Right eye exhibits no discharge. Left eye exhibits no discharge. Neck: Normal range of motion. Neck supple. Cardiovascular: Normal rate and regular rhythm.     Pulmonary/Chest: Effort normal and breath sounds normal. He has no wheezes. Abdominal: Soft. Bowel sounds are normal. There is no tenderness. Lymphadenopathy:     He has no cervical adenopathy. Neurological: He is alert and oriented to person, place, and time. Skin: He is not diaphoretic. Psychiatric: He has a normal mood and affect. His behavior is normal.   Nursing note and vitals reviewed. ASSESSMENT and PLAN    ICD-10-CM ICD-9-CM    1. Narcolepsy without cataplexy G47.419 347.00 dexmethylphenidate (FOCALIN XR) 10 mg ER Capsule sent to pharmacy    Discussed it is possible that his daytime sleepiness could be caused by narcolepsy. I want him to start on Focalin and see if it helps. Monadifil not covered by insurance. D/c Adderall. He should follow-up in 1 month. 2. Dysthymia F34.1 300.4 buPROPion (WELLBUTRIN) 100 mg tablet sent to pharmacy    Doing well on Wellbutrin. Dose increased to 100 mg BID. 3. Morbidly obese (HCC) E66.01 278.01 Increased dose of Wellbutrin with naltrexone may help him lose weight. Encouraged him to start a weight loss program.   4. LASHAUN on CPAP G47.33 327.23 Sleep medicine appointment scheduled for 04/2018. Z99.89 V46.8      5. Chronic left hip pain M25.552 719.45 MRI LUMB SPINE W WO CONT    G89.29 338.29   He has been going to PT, who said he needs a spine MRI. This plan was reviewed with the patient and patient agrees. All questions were answered. This scribe documentation was reviewed by me and accurately reflects the examination and decisions made by me. This note will not be viewable in [address]

## 2018-01-03 ENCOUNTER — HOSPITAL ENCOUNTER (OUTPATIENT)
Dept: MRI IMAGING | Age: 38
Discharge: HOME | End: 2018-01-03
Attending: INTERNAL MEDICINE
Payer: COMMERCIAL

## 2018-01-03 DIAGNOSIS — G89.29 CHRONIC LEFT HIP PAIN: ICD-10-CM

## 2018-01-03 DIAGNOSIS — M25.552 CHRONIC LEFT HIP PAIN: ICD-10-CM

## 2018-01-03 PROCEDURE — 72148 MRI LUMBAR SPINE W/O DYE: CPT

## 2018-02-09 ENCOUNTER — DOCUMENTATION ONLY (OUTPATIENT)
Dept: INTERNAL MEDICINE CLINIC | Age: 38
End: 2018-02-09

## 2018-02-09 DIAGNOSIS — F34.1 DYSTHYMIA: ICD-10-CM

## 2018-02-09 DIAGNOSIS — G47.419 NARCOLEPSY WITHOUT CATAPLEXY: ICD-10-CM

## 2018-02-09 RX ORDER — BUPROPION HYDROCHLORIDE 100 MG/1
100 TABLET ORAL 2 TIMES DAILY
Qty: 60 TAB | Refills: 0 | Status: SHIPPED | OUTPATIENT
Start: 2018-02-09 | End: 2018-06-15 | Stop reason: SDUPTHER

## 2018-02-09 RX ORDER — DEXMETHYLPHENIDATE HYDROCHLORIDE 10 MG/1
10 CAPSULE, EXTENDED RELEASE ORAL
Qty: 30 CAP | Refills: 0 | Status: SHIPPED | OUTPATIENT
Start: 2018-02-09 | End: 2018-03-11

## 2018-02-09 NOTE — TELEPHONE ENCOUNTER
From: Yue Georges  To: Renetta Ding MD  Sent: 2/9/2018 10:23 AM EST  Subject: Medication Renewal Request    Original authorizing provider: MD Yue Tomlin would like a refill of the following medications:  buPROPion (WELLBUTRIN) 100 mg tablet Renetta Ding MD]  dexmethylphenidate (FOCALIN XR) 10 mg ER Capsule Renetta Ding MD]    Preferred pharmacy: [facility] [address]    Comment:

## 2018-04-03 ENCOUNTER — OFFICE VISIT (OUTPATIENT)
Dept: SLEEP MEDICINE | Age: 38
End: 2018-04-03

## 2018-04-03 VITALS
DIASTOLIC BLOOD PRESSURE: 83 MMHG | HEART RATE: 105 BPM | HEIGHT: 67 IN | WEIGHT: 305.8 LBS | TEMPERATURE: 98.7 F | BODY MASS INDEX: 48 KG/M2 | SYSTOLIC BLOOD PRESSURE: 121 MMHG | OXYGEN SATURATION: 95 %

## 2018-04-03 DIAGNOSIS — Z86.59 H/O: DEPRESSION: ICD-10-CM

## 2018-04-03 DIAGNOSIS — G47.33 OSA (OBSTRUCTIVE SLEEP APNEA): Primary | ICD-10-CM

## 2018-04-03 RX ORDER — BUPROPION HYDROCHLORIDE 100 MG/1
TABLET ORAL
COMMUNITY
End: 2019-02-08 | Stop reason: ALTCHOICE

## 2018-04-03 RX ORDER — DEXMETHYLPHENIDATE HYDROCHLORIDE 10 MG/1
10 TABLET ORAL 2 TIMES DAILY
COMMUNITY
End: 2018-07-24 | Stop reason: SDUPTHER

## 2018-04-03 RX ORDER — ARMODAFINIL 50 MG/1
50 TABLET ORAL
Qty: 30 TAB | Refills: 2 | Status: SHIPPED | OUTPATIENT
Start: 2018-04-03 | End: 2019-02-01 | Stop reason: SDUPTHER

## 2018-04-03 NOTE — MR AVS SNAPSHOT
[address] [address] 
[phone] Patient: Joni Babcock MRN: [id number] GRR:[id number] Visit Information Date & Time Provider Department Dept. Phone Encounter #  
 4/3/2018  3:40 PM Saroj Brennan MD [address] [address] [id number] Follow-up Instructions Return if symptoms worsen or fail to improve. Follow-up and Disposition History Upcoming Health Maintenance Date Due DTaP/Tdap/Td series (3 - Td) 4/12/2021 Allergies as of 4/3/2018  Review Complete On: 4/3/2018 By: Saroj Brennan MD  
  
 Severity Noted Reaction Type Reactions Sulfa (Sulfonamide Antibiotics)  11/11/2009    Nausea and Vomiting Current Immunizations  Reviewed on 4/12/2011 Name Date  
 TD Vaccine 1/1/1998 TDAP Vaccine 4/12/2011 Not reviewed this visit You Were Diagnosed With   
  
 Codes Comments LASHAUN (obstructive sleep apnea)    -  Primary ICD-10-CM: G47.33 
ICD-9-CM: 327.23 BMI 45.0-49.9, adult [facility])     ICD-10-CM: B04.74 
ICD-9-CM: V85.42   
 H/O: depression     ICD-10-CM: Z86.59 
ICD-9-CM: V11.8 Vitals BP Pulse Temp Height(growth percentile) Weight(growth percentile) SpO2  
 121/83 (!) 105 98.7 °F (37.1 °C) 5' 7\" (1.702 m) 305 lb 12.8 oz (138.7 kg) 95% BMI Smoking Status 47.9 kg/m2 Never Smoker Vitals History BMI and BSA Data Body Mass Index Body Surface Area  
 47.9 kg/m 2 2.56 m 2 Preferred Pharmacy Pharmacy Name Phone [address], [facility] [address] Your Updated Medication List  
  
   
This list is accurate as of 4/3/18  4:35 PM.  Always use your most recent med list.  
  
  
  
  
 armodafinil 50 mg Tab Take 50 mg by mouth every morning. Max Daily Amount: 50 mg.  Indications: SLEEPINESS DUE TO OBSTRUCTIVE SLEEP APNEA  
  
 dexmethylphenidate 10 mg tablet Take 10 mg by mouth two (2) times a day. WELLBUTRIN 100 mg tablet Generic drug:  buPROPion Take  by mouth. Prescriptions Printed Refills  
 armodafinil 50 mg tab 2 Sig: Take 50 mg by mouth every morning. Max Daily Amount: 50 mg. Indications: SLEEPINESS DUE TO OBSTRUCTIVE SLEEP APNEA Class: Print Route: Oral  
  
Follow-up Instructions Return if symptoms worsen or fail to improve. To-Do List   
 04/03/2018 Sleep Center:  SLEEP LAB (PAP TITRATION)   
  
 04/27/2018 9:30 PM  
  Appointment with BEDROOM [address] [place] [address] at [address] ([phone]) 1. Do not take a nap the day of the study  2. No caffeine after 12 noon the day of the study  3. Bring a 2 piece sleeping garment  4. Hair should be clean and dry, no oils, sprays, powders and remove wigs, weaves or other hair accessories  6. Patient should eat dinner prior to arriving for the test and a light breakfast will be provided upon discharge in the morning  7. Patient encouraged to bring activity items such as books, magazines, laptop, IPAD, etc, as well as toiletry items needed for the next morning  8. Bring all medications with you to the center  9. For specific questions please contact the sleep center directly, 830a to 5p  10. Do not arrive more than 15 minutes prior to appt time and use the doorbell to enter the sleep center. Introducing [facility]! Dear Raven White: Thank you for requesting a [employer] account. Our records indicate that you already have an active [employer] account. You can access your account anytime at [email or web address] Did you know that you can access your hospital and ER discharge instructions at any time in [employer]? You can also review all of your test results from your hospital stay or ER visit. Additional Information If you have questions, please visit the Frequently Asked Questions section of the [email or web address] website at [email or web address] Remember, [email or web address] is NOT to be used for urgent needs. For medical emergencies, dial 911. Now available from your iPhone and Android! Please provide this summary of care documentation to your next provider. Your primary care clinician is listed as Jesus Zamora. If you have any questions after today's visit, please call [phone].

## 2018-04-03 NOTE — PROGRESS NOTES
[address] [place]. [place], [address]  Tel.  [phone]  Fax. [address] [facility] [address]  [place], [address]  Tel.  [phone]  Fax. [phone] [address] [facility] [address] [place]   Tel.  [phone]  Fax. [phone]     S>Shorty Hernandez is a 45 y.o. male seen for a positive airway pressure follow-up. He reports no problems using the device. He is 100% compliant over the past 30 days. The following problems are identified:    Drowsiness no Problems exhaling no   Snoring no Forget to put on no   Mask Comfortable yes Can't fall asleep no   Dry Mouth no Mask falls off no   Air Leaking no Frequent awakenings no         He admits that his sleep has not improved. He reports of having tried different masks and current nasal pillows is comfort yet he continues to struggle with CPAP therapy at night and remains tired and sleepy during the day. Oximetry on CPAP did not indicate significant hypoxemia. Allergies   Allergen Reactions    Sulfa (Sulfonamide Antibiotics) Nausea and Vomiting       He has a current medication list which includes the following prescription(s): bupropion and dexmethylphenidate. .      He  has a past medical history of Mixed hyperlipidemia; Obesity; Persistent disorder of initiating or maintaining sleep; and Sleep apnea. [place] Sleepiness Score: 12   and Modified F.O.S.Q. Score Total / 2: 8.5   which reflect improved sleep quality over therapy time.     O>    Visit Vitals    /83    Pulse (!) 105    Temp 98.7 °F (37.1 °C)    Ht 5' 7\" (1.702 m)    Wt 305 lb 12.8 oz (138.7 kg)    SpO2 95%    BMI 47.9 kg/m2         General:   Not in acute distress   Eyes:  Anicteric sclerae, no obvious strabismus   Nose:  No obvious nasal septum deviation    Oropharynx:   Class 4 oropharyngeal outlet, thick tongue base, uvula not seen due to low-lying soft palate, narrow tonsilo-pharyngeal pilars   Tonsils:   tonsils are not visualized due to low-lying soft palate   Neck:   midline trachea   Chest/Lungs:  Equal lung expansion, clear on auscultation    CVS:  Normal rate, regular rhythm; no JVD   Skin:  Warm to touch; no obvious rashes   Neuro:  No focal deficits ; no obvious tremor    Psych:  Normal affect,  normal countenance;           A>    ICD-10-CM ICD-9-CM    1. LASHAUN (obstructive sleep apnea) G47.33 327.23 SLEEP LAB (PAP TITRATION)   2. BMI 45.0-49.9, adult (HCC) Z68.42 V85.42    3. H/O: depression Z86.59 V11.8      AHI = 37.5 (2017). On Resmed :  APAP 4-20 cmH2O. Compliant:      yes    Therapeutic Response:  Negative    P>    Orders Placed This Encounter    SLEEP LAB (PAP TITRATION)     Standing Status:   Future     Standing Expiration Date:   10/2/2018     Scheduling Instructions:      Perform Bi-level Titration. Order Specific Question:   Reason for Exam     Answer:   LASHAUN    armodafinil 50 mg tab     Sig: Take 50 mg by mouth every morning. Max Daily Amount: 50 mg. Indications: SLEEPINESS DUE TO OBSTRUCTIVE SLEEP APNEA     Dispense:  30 Tab     Refill:  2       * We have recommended a dedicated weight loss through appropriate diet and an exercise regiment as significant weight reduction has been shown to reduce severity of obstructive sleep apnea. * Follow-up Disposition:  Return if symptoms worsen or fail to improve. * He was asked to contact our office for any problems regarding PAP therapy. * Counseling was provided regarding the importance of regular PAP use and on proper sleep hygiene and safe driving. * Re-enforced proper and regular cleaning for the device. Thank you for allowing us to participate in your patient's medical care. Amelie Benton MD, FAASM  Electronically signed.  04/03/18

## 2018-04-27 ENCOUNTER — HOSPITAL ENCOUNTER (OUTPATIENT)
Dept: SLEEP MEDICINE | Age: 38
Discharge: HOME | End: 2018-04-27
Payer: COMMERCIAL

## 2018-04-27 VITALS
BODY MASS INDEX: 48.08 KG/M2 | HEIGHT: 67 IN | OXYGEN SATURATION: 96 % | HEART RATE: 92 BPM | SYSTOLIC BLOOD PRESSURE: 155 MMHG | DIASTOLIC BLOOD PRESSURE: 110 MMHG | WEIGHT: 306.3 LBS

## 2018-04-27 DIAGNOSIS — G47.33 OSA (OBSTRUCTIVE SLEEP APNEA): ICD-10-CM

## 2018-04-27 PROCEDURE — 95811 POLYSOM 6/>YRS CPAP 4/> PARM: CPT | Performed by: INTERNAL MEDICINE

## 2018-04-30 ENCOUNTER — TELEPHONE (OUTPATIENT)
Dept: SLEEP MEDICINE | Age: 38
End: 2018-04-30

## 2018-04-30 DIAGNOSIS — G47.33 OSA (OBSTRUCTIVE SLEEP APNEA): Primary | ICD-10-CM

## 2018-05-01 ENCOUNTER — DOCUMENTATION ONLY (OUTPATIENT)
Dept: SLEEP MEDICINE | Age: 38
End: 2018-05-01

## 2018-05-01 NOTE — TELEPHONE ENCOUNTER
Orders Placed This Encounter    AMB SUPPLY ORDER     Diagnosis: (G47.33) LASHAUN (obstructive sleep apnea)  (primary encounter diagnosis)     Positive Airway Pressure Therapy: Duration of need: 99 months. Respironics DreamStation ( Bi-Level Unit / Spontaneous Mode):    IPAP: 14 cmH2O; Minimum EPAP: 06 cmH2O. Ramp Time: 30 Minutes; Flex: 2. Remote monitoring enrollment.  Heated Humidifier     Oral/Nasal Combo Mask 1 every 3 months.  Oral Cushion Combo Mask (Replace) 2 per month.  Nasal Pillows Combo Mask (Replace) 2 per month.  Full Face Mask 1 every 3 months.  Full Face Mask Cushion 1 per month.  Nasal Cushion (Replace) 2 per month.  Nasal Pillows (Replace) 2 per month.  Nasal Interface Mask 1 every 3 months.  Headgear 1 every 6 months.  Chinstrap 1 every 6 months.  Tubing 1 every 3 months.  Filter(s) Disposable 2 per month.  Filter(s) Non-Disposable 1 every 6 months.  Oral Interface 1 every 3 months. [address] for [employer] (Replace) 1 every 6 months.  Tubing with heating element 1 every 3 months. Perform Mask Fitting per patient preference and comfort - replace as above. Matthew Agustin MD, FAASM; NPI: [id number]  Electronically signed. 05/01/18

## 2018-06-15 DIAGNOSIS — F34.1 DYSTHYMIA: ICD-10-CM

## 2018-06-16 RX ORDER — BUPROPION HYDROCHLORIDE 100 MG/1
TABLET ORAL
Qty: 60 TAB | Refills: 0 | Status: SHIPPED | OUTPATIENT
Start: 2018-06-16 | End: 2018-10-01 | Stop reason: SDUPTHER

## 2018-07-24 ENCOUNTER — OFFICE VISIT (OUTPATIENT)
Dept: SLEEP MEDICINE | Age: 38
End: 2018-07-24

## 2018-07-24 VITALS
DIASTOLIC BLOOD PRESSURE: 82 MMHG | HEART RATE: 90 BPM | WEIGHT: 310 LBS | BODY MASS INDEX: 48.65 KG/M2 | SYSTOLIC BLOOD PRESSURE: 122 MMHG | OXYGEN SATURATION: 97 % | HEIGHT: 67 IN

## 2018-07-24 DIAGNOSIS — Z86.59 H/O: DEPRESSION: ICD-10-CM

## 2018-07-24 DIAGNOSIS — G47.10 HYPERSOMNIA WITH SLEEP APNEA: Primary | ICD-10-CM

## 2018-07-24 DIAGNOSIS — G47.30 HYPERSOMNIA WITH SLEEP APNEA: Primary | ICD-10-CM

## 2018-07-24 RX ORDER — ARMODAFINIL 50 MG/1
50 TABLET ORAL
Qty: 30 TAB | Refills: 5 | Status: SHIPPED | OUTPATIENT
Start: 2018-07-24 | End: 2019-01-24 | Stop reason: SDUPTHER

## 2018-07-24 NOTE — PROGRESS NOTES
[address] [facility] [place]. [place], [address]  Tel.  [phone]  Fax. 100 [facility] [address]  [place], [address] [facility]  Tel.  [phone]  Fax. [phone] [address]  Tel.  [phone]  Fax. [phone]     S>Shorty Hastings is a 45 y.o. male seen for a positive airway pressure follow-up. He reports no problems using the device. He is 93% compliant over the past 30 days. The following problems are identified:    Drowsiness no Problems exhaling no   Snoring no Forget to put on no   Mask Comfortable yes Can't fall asleep no   Dry Mouth no Mask falls off no   Air Leaking no Frequent awakenings no         He admits that his sleep has improved. He reports of sleeping well and feels less sleepy with Armodafinil. He denies of any side effects from this medication. He denies of symptoms indicative of cataplexy, sleep paralysis, hypnagogic hallucinations or of vivid colorful dreams. Allergies   Allergen Reactions    Sulfa (Sulfonamide Antibiotics) Nausea and Vomiting       He has a current medication list which includes the following prescription(s): armodafinil, bupropion, bupropion, and armodafinil. Pasquale Roque He  has a past medical history of Mixed hyperlipidemia; Obesity; Persistent disorder of initiating or maintaining sleep; and Sleep apnea. [place] Sleepiness Score: 6   and Modified F.O.S.Q. Score Total / 2: 12   which reflect improved sleep quality over therapy time.     O>    Visit Vitals    /82    Pulse 90    Ht 5' 7\" (1.702 m)    Wt 310 lb (140.6 kg)    SpO2 97%    BMI 48.55 kg/m2         General:   Not in acute distress   Eyes:  Anicteric sclerae, no obvious strabismus   Nose:  No obvious nasal septum deviation    Oropharynx:   Class 4 oropharyngeal outlet, thick tongue base, uvula not seen due to low-lying soft palate, narrow tonsilo-pharyngeal pilars   Tonsils:   tonsils are not visualized due to low-lying soft palate Neck:   midline trachea   Chest/Lungs:  Equal lung expansion, clear on auscultation    CVS:  Normal rate, regular rhythm; no JVD   Skin:  Warm to touch; no obvious rashes   Neuro:  No focal deficits ; no obvious tremor    Psych:  Normal affect,  normal countenance;           A>    ICD-10-CM ICD-9-CM    1. Hypersomnia with sleep apnea G47.10 780.53 armodafinil 50 mg tab    G47.30     2. BMI 45.0-49.9, adult (HCC) Z68.42 V85.42    3. H/O: depression Z86.59 V11.8      AHI = 37.5 (2017). On Bi - Level :  14/06 cmH2O. Compliant:      yes    Therapeutic Response:  Positive    P>  Orders Placed This Encounter    armodafinil 50 mg tab     Sig: Take 50 mg by mouth Daily (before breakfast). Max Daily Amount: 50 mg. Dispense:  30 Tab     Refill:  5     * We have recommended a dedicated weight loss through appropriate diet and an exercise regiment as significant weight reduction has been shown to reduce severity of obstructive sleep apnea. * Follow-up Disposition:  Return in about 6 months (around 1/24/2019), or if symptoms worsen or fail to improve. * He was asked to contact our office for any problems regarding PAP therapy. * Counseling was provided regarding the importance of regular PAP use and on proper sleep hygiene and safe driving. * Re-enforced proper and regular cleaning for the device. Thank you for allowing us to participate in your patient's medical care. Ashia Boo MD, [facility]  Electronically signed.  07/24/18

## 2018-07-24 NOTE — PATIENT INSTRUCTIONS
217 [facility] [place]. [place], [address]  Tel.  [phone]  Fax. 100 [facility] 60  [place], [address] [facility]  Tel.  [phone]  Fax. [phone] [address]  Tel.  [phone]  Fax. [phone]     Learning About CPAP for Sleep Apnea  What is CPAP? CPAP is a small machine that you use at home every night while you sleep. It increases air pressure in your throat to keep your airway open. When you have sleep apnea, this can help you sleep better so you feel much better. CPAP stands for \"continuous positive airway pressure. \"  The CPAP machine will have one of the following:  · A mask that covers your nose and mouth  · Prongs that fit into your nose  · A mask that covers your nose only, the most common type. This type is called NCPAP. The N stands for \"nasal.\"  Why is it done? CPAP is usually the best treatment for obstructive sleep apnea. It is the first treatment choice and the most widely used. Your doctor may suggest CPAP if you have:  · Moderate to severe sleep apnea. · Sleep apnea and coronary artery disease (CAD) or heart failure. How does it help? · CPAP can help you have more normal sleep, so you feel less sleepy and more alert during the daytime. · CPAP may help keep heart failure or other heart problems from getting worse. · NCPAP may help lower your blood pressure. · If you use CPAP, your bed partner may also sleep better because you are not snoring or restless. What are the side effects? Some people who use CPAP have:  · A dry or stuffy nose and a sore throat. · Irritated skin on the face. · Sore eyes. · Bloating. If you have any of these problems, work with your doctor to fix them. Here are some things you can try:  · Be sure the mask or nasal prongs fit well. · See if your doctor can adjust the pressure of your CPAP. · If your nose is dry, try a humidifier.   · If your nose is runny or stuffy, try decongestant medicine or a steroid nasal spray. If these things do not help, you might try a different type of machine. Some machines have air pressure that adjusts on its own. Others have air pressures that are different when you breathe in than when you breathe out. This may reduce discomfort caused by too much pressure in your nose. Where can you learn more? Go to Inway Studios.be  Enter Janece Hill in the search box to learn more about \"Learning About CPAP for Sleep Apnea. \"   © 6877-6977 Healthwise, Incorporated. Care instructions adapted under license by [employer] (which disclaims liability or warranty for this information). This care instruction is for use with your licensed healthcare professional. If you have questions about a medical condition or this instruction, always ask your healthcare professional. [address] any warranty or liability for your use of this information. Content Version: 5.0.38555; Last Revised: January 11, 2010  PROPER SLEEP HYGIENE    What to avoid  · Do not have drinks with caffeine, such as coffee or black tea, for 8 hours before bed. · Do not smoke or use other types of tobacco near bedtime. Nicotine is a stimulant and can keep you awake. · Avoid drinking alcohol late in the evening, because it can cause you to wake in the middle of the night. · Do not eat a big meal close to bedtime. If you are hungry, eat a light snack. · Do not drink a lot of water close to bedtime, because the need to urinate may wake you up during the night. · Do not read or watch TV in bed. Use the bed only for sleeping and sexual activity. What to try  · Go to bed at the same time every night, and wake up at the same time every morning. Do not take naps during the day. · Keep your bedroom quiet, dark, and cool. · Get regular exercise, but not within 3 to 4 hours of your bedtime. .  · Sleep on a comfortable pillow and mattress.   · If watching the clock makes you anxious, turn it facing away from you so you cannot see the time. · If you worry when you lie down, start a worry book. Well before bedtime, write down your worries, and then set the book and your concerns aside. · Try meditation or other relaxation techniques before you go to bed. · If you cannot fall asleep, get up and go to another room until you feel sleepy. Do something relaxing. Repeat your bedtime routine before you go to bed again. · Make your house quiet and calm about an hour before bedtime. Turn down the lights, turn off the TV, log off the computer, and turn down the volume on music. This can help you relax after a busy day. Drowsy Driving: The [employer] cites drowsiness as a causing factor in more than 549,642 police reported crashes annually, resulting in 76,000 injuries and 1,500 deaths. Other surveys suggest 55% of people polled have driven while drowsy in the past year, 23% had fallen asleep but not crashed, 3% crashed, and 2% had and accident due to drowsy driving. Who is at risk? Young Drivers: One study of drowsy driving accidents states that 55% of the drivers were under 25 years. Of those, 75% were male. Shift Workers and Travelers: People who work overnight or travel across time zones frequently are at higher risk of experiencing Circadian Rhythm Disorders. They are trying to work and function when their body is programed to sleep. Sleep Deprived: Lack of sleep has a serious impact on your ability to pay attention or focus on a task. Consistently getting less than the average of 8 hours your body needs creates partial or cumulative sleep deprivation. Untreated Sleep Disorders: Sleep Apnea, Narcolepsy, R.L.S., and other sleep disorders (untreated) prevent a person from getting enough restful sleep. This leads to excessive daytime sleepiness and increases the risk for drowsy driving accidents by up to 7 times.   Medications / Alcohol: Even over the counter medications can cause drowsiness. Medications that impair a drivers attention should have a warning label. Alcohol naturally makes you sleepy and on its own can cause accidents. Combined with excessive drowsiness its effects are amplified. Signs of Drowsy Driving:   * You don't remember driving the last few miles   * You may drift out of your mariella   * You are unable to focus and your thoughts wander   * You may yawn more often than normal   * You have difficulty keeping your eyes open / nodding off   * Missing traffic signs, speeding, or tailgating  Prevention-   Good sleep hygiene, lifestyle and behavioral choices have the most impact on drowsy driving. There is no substitute for sleep and the average person requires 8 hours nightly. If you find yourself driving drowsy, stop and sleep. Consider the sleep hygiene tips provided during your visit as well. Medication Refill Policy: Refills for all medications require 1 week advance notice. Please have your pharmacy fax a refill request. We are unable to fax, or call in \"controled substance\" medications and you will need to pick these prescriptions up from our office. [email or web address] Activation    Thank you for requesting access to [email or web address]. Please follow the instructions below to securely access and download your online medical record. [email or web address] allows you to send messages to your doctor, view your test results, renew your prescriptions, schedule appointments, and more. How Do I Sign Up? 1. In your internet browser, go to [email or web address] 2. Click on the First Time User? Click Here link in the Sign In box. You will see the New Member Sign Up page. 3. Enter your [email or web address] Access Code exactly as it appears below. You will not need to use this code after youve completed the sign-up process. If you do not sign up before the expiration date, you must request a new code. [email or web address] Access Code:  Activation code not generated  Current [employer] Status: Active (This is the date your [employer] access code will )    4. Enter the last four digits of your Social Security Number (xxxx) and Date of Birth (mm/dd/yyyy) as indicated and click Submit. You will be taken to the next sign-up page. 5. Create a MLW Squaredt ID. This will be your [employer] login ID and cannot be changed, so think of one that is secure and easy to remember. 6. Create a [employer] password. You can change your password at any time. 7. Enter your Password Reset Question and Answer. This can be used at a later time if you forget your password. 8. Enter your e-mail address. You will receive e-mail notification when new information is available in [address] 9. Click Sign Up. You can now view and download portions of your medical record. 10. Click the Download Summary menu link to download a portable copy of your medical information. Additional Information    If you have questions, please call [phone]. Remember, [employer] is NOT to be used for urgent needs. For medical emergencies, dial 911.

## 2018-07-24 NOTE — MR AVS SNAPSHOT
[id number] [facility] [address] [address] 
[phone] Patient: Pauline Stover MRN: [id number] [facility]:[birth year] Visit Information Date & Time Provider Department Dept. Phone Encounter #  
 7/24/2018 11:00 AM Kyara Garcias MD [address] [address] [id number] Follow-up Instructions Return in about 6 months (around 1/24/2019), or if symptoms worsen or fail to improve. Routing History Upcoming Health Maintenance Date Due Influenza Age 5 to Adult 8/1/2018 DTaP/Tdap/Td series (3 - Td) 4/12/2021 Allergies as of 7/24/2018  Review Complete On: 7/24/2018 By: Kyara Garcias MD  
  
 Severity Noted Reaction Type Reactions Sulfa (Sulfonamide Antibiotics)  11/11/2009    Nausea and Vomiting Current Immunizations  Reviewed on 4/12/2011 Name Date  
 TD Vaccine 1/1/1998 TDAP Vaccine 4/12/2011 Not reviewed this visit You Were Diagnosed With   
  
 Codes Comments Hypersomnia with sleep apnea    -  Primary ICD-10-CM: G47.10, G47.30 ICD-9-CM: 780.53 BMI 45.0-49.9, adult [facility])     ICD-10-CM: Z60.90 
ICD-9-CM: V85.42   
 H/O: depression     ICD-10-CM: Z86.59 
ICD-9-CM: V11.8 Vitals BP Pulse Height(growth percentile) Weight(growth percentile) SpO2 BMI  
 122/82 90 5' 7\" (1.702 m) 310 lb (140.6 kg) 97% 48.55 kg/m2 Smoking Status Never Smoker Vitals History BMI and BSA Data Body Mass Index Body Surface Area 48.55 kg/m 2 2.58 m 2 Preferred Pharmacy Pharmacy Name Phone [address] [facility] [place], [address] Your Updated Medication List  
  
   
This list is accurate as of 7/24/18 11:33 AM.  Always use your most recent med list.  
  
  
  
  
 * armodafinil 50 mg Tab Take 50 mg by mouth every morning. Max Daily Amount: 50 mg.  Indications: SLEEPINESS DUE TO OBSTRUCTIVE SLEEP APNEA  
  
 * armodafinil 50 mg Tab Take 50 mg by mouth Daily (before breakfast). Max Daily Amount: 50 mg.  
  
 * WELLBUTRIN 100 mg tablet Generic drug:  buPROPion Take  by mouth. * buPROPion 100 mg tablet Commonly known as:  WELLBUTRIN  
TAKE 1 TABLET BY MOUTH TWICE DAILY * Notice: This list has 4 medication(s) that are the same as other medications prescribed for you. Read the directions carefully, and ask your doctor or other care provider to review them with you. Prescriptions Printed Refills  
 armodafinil 50 mg tab 5 Sig: Take 50 mg by mouth Daily (before breakfast). Max Daily Amount: 50 mg.  
 Class: Print Route: Oral  
  
Follow-up Instructions Return in about 6 months (around 1/24/2019), or if symptoms worsen or fail to improve. Patient Instructions [address] [place]. [address] [facility], [facility] Tel.  [phone] Fax. 100 [facility] 60 [place], [address] [facility] Tel.  [phone] Fax. [phone] [address], [address] Tel.  [phone] Fax. [phone] Learning About CPAP for Sleep Apnea What is CPAP? CPAP is a small machine that you use at home every night while you sleep. It increases air pressure in your throat to keep your airway open. When you have sleep apnea, this can help you sleep better so you feel much better. CPAP stands for \"continuous positive airway pressure. \" The CPAP machine will have one of the following: · A mask that covers your nose and mouth · Prongs that fit into your nose · A mask that covers your nose only, the most common type. This type is called NCPAP. The N stands for \"nasal.\" Why is it done? CPAP is usually the best treatment for obstructive sleep apnea. It is the first treatment choice and the most widely used. Your doctor may suggest CPAP if you have: · Moderate to severe sleep apnea. · Sleep apnea and coronary artery disease (CAD) or heart failure. How does it help? · CPAP can help you have more normal sleep, so you feel less sleepy and more alert during the daytime. · CPAP may help keep heart failure or other heart problems from getting worse. · NCPAP may help lower your blood pressure. · If you use CPAP, your bed partner may also sleep better because you are not snoring or restless. What are the side effects? Some people who use CPAP have: · A dry or stuffy nose and a sore throat. · Irritated skin on the face. · Sore eyes. · Bloating. If you have any of these problems, work with your doctor to fix them. Here are some things you can try: · Be sure the mask or nasal prongs fit well. · See if your doctor can adjust the pressure of your CPAP. · If your nose is dry, try a humidifier. · If your nose is runny or stuffy, try decongestant medicine or a steroid nasal spray. If these things do not help, you might try a different type of machine. Some machines have air pressure that adjusts on its own. Others have air pressures that are different when you breathe in than when you breathe out. This may reduce discomfort caused by too much pressure in your nose. Where can you learn more? Go to tadoÂ°.be Enter P573 in the search box to learn more about \"Learning About CPAP for Sleep Apnea. \"  
© 1695-1206 Healthwise, Incorporated. Care instructions adapted under license by [employer] (which disclaims liability or warranty for this information). This care instruction is for use with your licensed healthcare professional. If you have questions about a medical condition or this instruction, always ask your healthcare professional. [place] [address] any warranty or liability for your use of this information. Content Version: 0.5.19880; Last Revised: January 11, 2010 PROPER SLEEP HYGIENE What to avoid · Do not have drinks with caffeine, such as coffee or black tea, for 8 hours before bed. · Do not smoke or use other types of tobacco near bedtime. Nicotine is a stimulant and can keep you awake. · Avoid drinking alcohol late in the evening, because it can cause you to wake in the middle of the night. · Do not eat a big meal close to bedtime. If you are hungry, eat a light snack. · Do not drink a lot of water close to bedtime, because the need to urinate may wake you up during the night. · Do not read or watch TV in bed. Use the bed only for sleeping and sexual activity. What to try · Go to bed at the same time every night, and wake up at the same time every morning. Do not take naps during the day. · Keep your bedroom quiet, dark, and cool. · Get regular exercise, but not within 3 to 4 hours of your bedtime. Didi Gearing · Sleep on a comfortable pillow and mattress. · If watching the clock makes you anxious, turn it facing away from you so you cannot see the time. · If you worry when you lie down, start a worry book. Well before bedtime, write down your worries, and then set the book and your concerns aside. · Try meditation or other relaxation techniques before you go to bed. · If you cannot fall asleep, get up and go to another room until you feel sleepy. Do something relaxing. Repeat your bedtime routine before you go to bed again. · Make your house quiet and calm about an hour before bedtime. Turn down the lights, turn off the TV, log off the computer, and turn down the volume on music. This can help you relax after a busy day. Drowsy Driving: The [employer] cites drowsiness as a causing factor in more than 433,162 police reported crashes annually, resulting in 76,000 injuries and 1,500 deaths.  Other surveys suggest 55% of people polled have driven while drowsy in the past year, 23% had fallen asleep but not crashed, 3% crashed, and 2% had and accident due to drowsy driving. Who is at risk? Young Drivers: One study of drowsy driving accidents states that 55% of the drivers were under 25 years. Of those, 75% were male. Shift Workers and Travelers: People who work overnight or travel across time zones frequently are at higher risk of experiencing Circadian Rhythm Disorders. They are trying to work and function when their body is programed to sleep. Sleep Deprived: Lack of sleep has a serious impact on your ability to pay attention or focus on a task. Consistently getting less than the average of 8 hours your body needs creates partial or cumulative sleep deprivation. Untreated Sleep Disorders: Sleep Apnea, Narcolepsy, R.L.S., and other sleep disorders (untreated) prevent a person from getting enough restful sleep. This leads to excessive daytime sleepiness and increases the risk for drowsy driving accidents by up to 7 times. Medications / Alcohol: Even over the counter medications can cause drowsiness. Medications that impair a drivers attention should have a warning label. Alcohol naturally makes you sleepy and on its own can cause accidents. Combined with excessive drowsiness its effects are amplified. Signs of Drowsy Driving: * You don't remember driving the last few miles * You may drift out of your mariella * You are unable to focus and your thoughts wander * You may yawn more often than normal 
 * You have difficulty keeping your eyes open / nodding off * Missing traffic signs, speeding, or tailgating Prevention-  
Good sleep hygiene, lifestyle and behavioral choices have the most impact on drowsy driving. There is no substitute for sleep and the average person requires 8 hours nightly. If you find yourself driving drowsy, stop and sleep. Consider the sleep hygiene tips provided during your visit as well.   
 
Medication Refill Policy: Refills for all medications require 1 week advance notice. Please have your pharmacy fax a refill request. We are unable to fax, or call in \"controled substance\" medications and you will need to pick these prescriptions up from our office. MyChart Activation Thank you for requesting access to [employer]. Please follow the instructions below to securely access and download your online medical record. [employer] allows you to send messages to your doctor, view your test results, renew your prescriptions, schedule appointments, and more. How Do I Sign Up? 1. In your internet browser, go to [email or web address]. 2. Click on the First Time User? Click Here link in the Sign In box. You will see the New Member Sign Up page. 3. Enter your [employer] Access Code exactly as it appears below. You will not need to use this code after youve completed the sign-up process. If you do not sign up before the expiration date, you must request a new code. [employer] Access Code: Activation code not generated Current [employer] Status: Active (This is the date your [employer] access code will ) 4. Enter the last four digits of your Social Security Number (xxxx) and Date of Birth (mm/dd/yyyy) as indicated and click Submit. You will be taken to the next sign-up page. 5. Create a [employer] ID. This will be your [employer] login ID and cannot be changed, so think of one that is secure and easy to remember. 6. Create a [employer] password. You can change your password at any time. 7. Enter your Password Reset Question and Answer. This can be used at a later time if you forget your password. 8. Enter your e-mail address. You will receive e-mail notification when new information is available in [address] 9. Click Sign Up. You can now view and download portions of your medical record. 10. Click the Download Summary menu link to download a portable copy of your medical information. Additional Information If you have questions, please call [phone]. Remember, [employer] is NOT to be used for urgent needs. For medical emergencies, dial 911. Introducing [facility]! Dear Jillian Meraz: Thank you for requesting a [employer] account. Our records indicate that you already have an active [employer] account. You can access your account anytime at [email or web address] Did you know that you can access your hospital and ER discharge instructions at any time in [employer]? You can also review all of your test results from your hospital stay or ER visit. Additional Information If you have questions, please visit the Frequently Asked Questions section of the [employer] website at [email or web address] Remember, [employer] is NOT to be used for urgent needs. For medical emergencies, dial 911. Now available from your iPhone and Android! Please provide this summary of care documentation to your next provider. Your primary care clinician is listed as Judy Moran. If you have any questions after today's visit, please call [phone].

## 2018-08-21 ENCOUNTER — DOCUMENTATION ONLY (OUTPATIENT)
Dept: SLEEP MEDICINE | Age: 38
End: 2018-08-21

## 2018-10-01 DIAGNOSIS — F34.1 DYSTHYMIA: ICD-10-CM

## 2018-10-01 RX ORDER — BUPROPION HYDROCHLORIDE 100 MG/1
TABLET ORAL
Qty: 60 TAB | Refills: 0 | Status: SHIPPED | OUTPATIENT
Start: 2018-10-01 | End: 2018-12-12 | Stop reason: SDUPTHER

## 2018-10-29 ENCOUNTER — OFFICE VISIT (OUTPATIENT)
Dept: PRIMARY CARE CLINIC | Age: 38
End: 2018-10-29

## 2018-10-29 ENCOUNTER — HOSPITAL ENCOUNTER (OUTPATIENT)
Dept: ULTRASOUND IMAGING | Age: 38
Discharge: HOME | End: 2018-10-29
Attending: INTERNAL MEDICINE
Payer: COMMERCIAL

## 2018-10-29 VITALS
TEMPERATURE: 99.6 F | RESPIRATION RATE: 18 BRPM | HEIGHT: 67 IN | HEART RATE: 96 BPM | OXYGEN SATURATION: 99 % | WEIGHT: 315 LBS | SYSTOLIC BLOOD PRESSURE: 135 MMHG | DIASTOLIC BLOOD PRESSURE: 82 MMHG | BODY MASS INDEX: 49.44 KG/M2

## 2018-10-29 DIAGNOSIS — R22.2 SOFT TISSUE SWELLING OF CHEST WALL: ICD-10-CM

## 2018-10-29 DIAGNOSIS — R00.0 TACHYCARDIA: Primary | ICD-10-CM

## 2018-10-29 PROCEDURE — 76604 US EXAM CHEST: CPT

## 2018-10-29 NOTE — PROGRESS NOTES
Patient noticed he has a lump on right side of neck. He feels numbness in right arm and hand. He was moving over the weekend and feels like he may have strained something. If patient moves a certain way he has difficulty breathing. Visit Vitals /82 (BP 1 Location: Left arm, BP Patient Position: Sitting) Pulse 96 Temp 99.6 °F (37.6 °C) (Oral) Resp 18 Ht 5' 7\" (1.702 m) Wt 315 lb 6.4 oz (143.1 kg) SpO2 99% BMI 49.40 kg/m² 1. Have you been to the ER, urgent care clinic since your last visit? Hospitalized since your last visit? No 
 
2. Have you seen or consulted any other health care providers outside of the [address] [place] [address] since your last visit? Include any pap smears or colon screening.  no

## 2018-10-29 NOTE — PROGRESS NOTES
Written by William Goodman, as dictated by Dr. Jorge A Nicole MD. 
 
[address] [facility] Robert Harvey is a 45 y.o. male. HPI The patient presents today c/o lump in his neck which he noticed this morning. He notes that if he moves the lump it makes it hard for him to breath. Patient denies pain, but notes that he is experiencing some numbness in his R arm. He notes that the lump has neither increased nor decreased in size. The patient notes that he moved a TV last night and was hanging it on the wall. Patient notes he recently moved into a house and has been experiencing pain in his back and knees, especially his R knee. Pulse is high at 96. EKG performed in office found sinus rhythm. Denies palpitations and chest pain. Compliant on Wellbutrin 100 mg and armodafinil 50 mg. Patient Active Problem List  
Diagnosis Code  Hyperlipemia E78.5  Acne vulgaris L70.0  Depression F32.9  Chronic nonintractable headache R51  Obesity, morbid ([facility] [address] E66.01  
 Attention and concentration deficit R41.840  Recurrent depression ([facility] [address] F33.9 Current Outpatient Medications on File Prior to Visit Medication Sig Dispense Refill  buPROPion (WELLBUTRIN) 100 mg tablet TAKE 1 TABLET BY MOUTH TWICE DAILY 60 Tab 0  
 armodafinil 50 mg tab Take 50 mg by mouth every morning. Max Daily Amount: 50 mg. Indications: SLEEPINESS DUE TO OBSTRUCTIVE SLEEP APNEA 30 Tab 2  
 armodafinil 50 mg tab Take 50 mg by mouth Daily (before breakfast). Max Daily Amount: 50 mg. 30 Tab 5  
 buPROPion (WELLBUTRIN) 100 mg tablet Take  by mouth. No current facility-administered medications on file prior to visit. Allergies Allergen Reactions  Sulfa (Sulfonamide Antibiotics) Nausea and Vomiting Past Medical History:  
Diagnosis Date  Mixed hyperlipidemia  Obesity  Persistent disorder of initiating or maintaining sleep  Sleep apnea Past Surgical History: Procedure Laterality Date  HX ORTHOPAEDIC  '05  
 rt. shoulder - \"slap\" injury Family History Problem Relation Age of Onset  Cancer Father   
     colon  Asthma Sister Social History Socioeconomic History  Marital status: SINGLE Spouse name: Single  Number of children: 0  
 Years of education: Not on file  Highest education level: Not on file Social Needs  Financial resource strain: Not on file  Food insecurity - worry: Not on file  Food insecurity - inability: Not on file  Transportation needs - medical: Not on file  Transportation needs - non-medical: Not on file Occupational History  Occupation: IT Tobacco Use  Smoking status: Never Smoker  Smokeless tobacco: Never Used Substance and Sexual Activity  Alcohol use: Yes Alcohol/week: 1.5 oz Types: 3 Standard drinks or equivalent per week  Drug use: No  
 Sexual activity: Yes  
  Partners: Female Other Topics Concern  Not on file Social History Narrative  Not on file Review of Systems Respiratory: Negative for cough and shortness of breath. Cardiovascular: Negative for chest pain and palpitations. Musculoskeletal: Positive for back pain and joint pain. Negative for myalgias. Neurological: Negative for dizziness, tingling, sensory change and headaches. Psychiatric/Behavioral: Negative for depression, memory loss and substance abuse. Visit Vitals /82 (BP 1 Location: Left arm, BP Patient Position: Sitting) Pulse 96 Temp 99.6 °F (37.6 °C) (Oral) Resp 18 Ht 5' 7\" (1.702 m) Wt 315 lb 6.4 oz (143.1 kg) SpO2 99% BMI 49.40 kg/m² Physical Exam  
Constitutional: He is oriented to person, place, and time. He appears well-developed. No distress. Morbidly obese HENT:  
Right Ear: External ear normal.  
Left Ear: External ear normal.  
Eyes: Conjunctivae and EOM are normal. Right eye exhibits no discharge. Left eye exhibits no discharge. Neck: Normal range of motion. Neck supple. Cardiovascular: Regular rhythm. Tachycardia present. Pulmonary/Chest: Effort normal and breath sounds normal. He has no wheezes. Swelling in upper chest area on R side Abdominal: Soft. Bowel sounds are normal. There is no tenderness. Lymphadenopathy:  
  He has no cervical adenopathy. Neurological: He is alert and oriented to person, place, and time. Skin: He is not diaphoretic. Psychiatric: He has a normal mood and affect. His behavior is normal.  
Nursing note and vitals reviewed. ASSESSMENT and PLAN 
  ICD-10-CM ICD-9-CM 1. Tachycardia R00.0 785.0 EKG, 12 LEAD, INITIAL  
   EKG, 12 LEAD, INITIAL 
 
EKG performed in office found sinus rhythm. 2. Soft tissue swelling of chest wall R22.2 786.6 US CHEST Chest US ordered. This plan was reviewed with the patient and patient agrees. All questions were answered. This scribe documentation was reviewed by me and accurately reflects the examination and decisions made by me. This note will not be viewable in [address]

## 2018-10-30 ENCOUNTER — TELEPHONE (OUTPATIENT)
Dept: PRIMARY CARE CLINIC | Age: 38
End: 2018-10-30

## 2018-10-30 DIAGNOSIS — R22.1 MASS IN NECK: Primary | ICD-10-CM

## 2018-10-30 NOTE — PROGRESS NOTES
Result discussed with patient. Needs CT neck but we have to check creatinine before that. He will stop by to get her CMP checked.

## 2018-11-01 LAB
ALBUMIN SERPL-MCNC: 4 G/DL (ref 3.5–5.5)
ALBUMIN/GLOB SERPL: 1.3 {RATIO} (ref 1.2–2.2)
ALP SERPL-CCNC: 91 IU/L (ref 39–117)
ALT SERPL-CCNC: 25 IU/L (ref 0–44)
AST SERPL-CCNC: 29 IU/L (ref 0–40)
BILIRUB SERPL-MCNC: 0.5 MG/DL (ref 0–1.2)
BUN SERPL-MCNC: 10 MG/DL (ref 6–20)
BUN/CREAT SERPL: 11 (ref 9–20)
CALCIUM SERPL-MCNC: 8.9 MG/DL (ref 8.7–10.2)
CHLORIDE SERPL-SCNC: 104 MMOL/L (ref 96–106)
CO2 SERPL-SCNC: 24 MMOL/L (ref 20–29)
CREAT SERPL-MCNC: 0.93 MG/DL (ref 0.76–1.27)
GLOBULIN SER CALC-MCNC: 3 G/DL (ref 1.5–4.5)
GLUCOSE SERPL-MCNC: 107 MG/DL (ref 65–99)
POTASSIUM SERPL-SCNC: 4.3 MMOL/L (ref 3.5–5.2)
PROT SERPL-MCNC: 7 G/DL (ref 6–8.5)
SODIUM SERPL-SCNC: 143 MMOL/L (ref 134–144)

## 2018-11-14 ENCOUNTER — OFFICE VISIT (OUTPATIENT)
Dept: PRIMARY CARE CLINIC | Age: 38
End: 2018-11-14

## 2018-11-14 VITALS
TEMPERATURE: 98.2 F | WEIGHT: 314.4 LBS | HEIGHT: 67 IN | BODY MASS INDEX: 49.35 KG/M2 | DIASTOLIC BLOOD PRESSURE: 78 MMHG | HEART RATE: 88 BPM | OXYGEN SATURATION: 94 % | RESPIRATION RATE: 16 BRPM | SYSTOLIC BLOOD PRESSURE: 132 MMHG

## 2018-11-14 DIAGNOSIS — R22.2 SUPRACLAVICULAR MASS: ICD-10-CM

## 2018-11-14 DIAGNOSIS — Z23 ENCOUNTER FOR IMMUNIZATION: ICD-10-CM

## 2018-11-14 DIAGNOSIS — Z00.00 PHYSICAL EXAM: Primary | ICD-10-CM

## 2018-11-14 DIAGNOSIS — E55.9 VITAMIN D DEFICIENCY: ICD-10-CM

## 2018-11-14 NOTE — PROGRESS NOTES
Written by Frank Orozco, as dictated by Dr. Kennedy Gupta MD. 
 
85 [facility] Cristy Kenny is a 45 y.o. male. HPI The patient comes in today for a complete physical examination and fasting labs. Patient notes that he is feeling a little dizzy as he has not eaten today. Denies headaches, postnasal drip, congestion, allergies, constipation. Patient notes that the mass on his neck has been fluctuating in size. He has not gone for his neck CT as it has decreased in size. Chest US performed on 10/29 found: eterminate right supraclavicular mass. CT neck with IV contrast is recommended for further evaluation. The patient notes that since the weather has changed he has noticed dry bumps on his hands. He has been experiencing pain in the back of his L calf and notes that it is difficult to move it sometimes. Patient has not tried oral or topical treatment. Compliant on CPAP at night. He does not take vitamin D. Patient would like to receive a flu shot today. Patient Active Problem List  
Diagnosis Code  Hyperlipemia E78.5  Acne vulgaris L70.0  Depression F32.9  Chronic nonintractable headache R51  Obesity, morbid ([facility] [address] E66.01  
 Attention and concentration deficit R41.840  Recurrent depression ([facility] [address] F33.9 Current Outpatient Medications on File Prior to Visit Medication Sig Dispense Refill  buPROPion (WELLBUTRIN) 100 mg tablet TAKE 1 TABLET BY MOUTH TWICE DAILY 60 Tab 0  
 armodafinil 50 mg tab Take 50 mg by mouth Daily (before breakfast). Max Daily Amount: 50 mg. 30 Tab 5  
 buPROPion (WELLBUTRIN) 100 mg tablet Take  by mouth.  armodafinil 50 mg tab Take 50 mg by mouth every morning. Max Daily Amount: 50 mg. Indications: SLEEPINESS DUE TO OBSTRUCTIVE SLEEP APNEA 30 Tab 2 No current facility-administered medications on file prior to visit. Allergies Allergen Reactions  Sulfa (Sulfonamide Antibiotics) Nausea and Vomiting Past Medical History:  
Diagnosis Date  Mixed hyperlipidemia  Obesity  Persistent disorder of initiating or maintaining sleep  Sleep apnea Past Surgical History:  
Procedure Laterality Date  HX ORTHOPAEDIC  '05  
 rt. shoulder - \"slap\" injury Family History Problem Relation Age of Onset  Cancer Father   
     colon  Asthma Sister Social History Socioeconomic History  Marital status: SINGLE Spouse name: Single  Number of children: 0  
 Years of education: Not on file  Highest education level: Not on file Social Needs  Financial resource strain: Not on file  Food insecurity - worry: Not on file  Food insecurity - inability: Not on file  Transportation needs - medical: Not on file  Transportation needs - non-medical: Not on file Occupational History  Occupation: IT Tobacco Use  Smoking status: Never Smoker  Smokeless tobacco: Never Used Substance and Sexual Activity  Alcohol use: Yes Alcohol/week: 1.5 oz Types: 3 Standard drinks or equivalent per week  Drug use: No  
 Sexual activity: Yes  
  Partners: Female Other Topics Concern  Not on file Social History Narrative  Not on file Orders Only on 10/30/2018 Component Date Value Ref Range Status  Glucose 10/31/2018 107* 65 - 99 mg/dL Final  
 BUN 10/31/2018 10  6 - 20 mg/dL Final  
 Creatinine 10/31/2018 0.93  0.76 - 1.27 mg/dL Final  
 GFR est non-AA 10/31/2018 104  >59 mL/min/1.73 Final  
 GFR est AA 10/31/2018 120  >59 mL/min/1.73 Final  
 BUN/Creatinine ratio 10/31/2018 11  9 - 20 Final  
 Sodium 10/31/2018 143  134 - 144 mmol/L Final  
 Potassium 10/31/2018 4.3  3.5 - 5.2 mmol/L Final  
 Chloride 10/31/2018 104  96 - 106 mmol/L Final  
 CO2 10/31/2018 24  20 - 29 mmol/L Final  
 Calcium 10/31/2018 8.9  8.7 - 10.2 mg/dL Final  
  Protein, total 10/31/2018 7.0  6.0 - 8.5 g/dL Final  
 Albumin 10/31/2018 4.0  3.5 - 5.5 g/dL Final  
 GLOBULIN, TOTAL 10/31/2018 3.0  1.5 - 4.5 g/dL Final  
 A-G Ratio 10/31/2018 1.3  1.2 - 2.2 Final  
 Bilirubin, total 10/31/2018 0.5  0.0 - 1.2 mg/dL Final  
 Alk. phosphatase 10/31/2018 91  39 - 117 IU/L Final  
 AST (SGOT) 10/31/2018 29  0 - 40 IU/L Final  
 ALT (SGPT) 10/31/2018 25  0 - 44 IU/L Final  
 
 
Review of Systems Constitutional: Negative for malaise/fatigue. HENT: Negative for congestion. Eyes: Negative for blurred vision and pain. Respiratory: Negative for cough and shortness of breath. Cardiovascular: Negative for chest pain and palpitations. Gastrointestinal: Negative for abdominal pain, constipation and heartburn. Genitourinary: Negative for frequency and urgency. Musculoskeletal: Positive for joint pain. Negative for myalgias. Neurological: Negative for dizziness, tingling, sensory change, weakness and headaches. Endo/Heme/Allergies: Negative for environmental allergies. Psychiatric/Behavioral: Negative for depression, memory loss and substance abuse. Visit Vitals /78 (BP 1 Location: Left arm, BP Patient Position: Sitting) Pulse 88 Temp 98.2 °F (36.8 °C) (Oral) Resp 16 Ht 5' 7\" (1.702 m) Wt 314 lb 6.4 oz (142.6 kg) SpO2 94% BMI 49.24 kg/m² Physical Exam  
Constitutional: He is oriented to person, place, and time. He appears well-developed. No distress. Morbidly obese HENT:  
Right Ear: External ear normal.  
Left Ear: External ear normal.  
Eyes: Conjunctivae and EOM are normal. Right eye exhibits no discharge. Left eye exhibits no discharge. Neck: Normal range of motion. Neck supple. Cardiovascular: Normal rate and regular rhythm. Pulses: 
     Dorsalis pedis pulses are 2+ on the right side, and 2+ on the left side. Pulmonary/Chest: Effort normal and breath sounds normal. He has no wheezes. Swelling in upper chest area on R side Abdominal: Soft. Bowel sounds are normal. There is no tenderness. Lymphadenopathy:  
  He has no cervical adenopathy. Neurological: He is alert and oriented to person, place, and time. Skin: He is not diaphoretic. Psychiatric: He has a normal mood and affect. His behavior is normal.  
Nursing note and vitals reviewed. ASSESSMENT and PLAN 
  ICD-10-CM ICD-9-CM 1. Physical exam Z00.00 V70.9 LIPID PANEL  
   CBC W/O DIFF  
   METABOLIC PANEL, COMPREHENSIVE  
   TSH 3RD GENERATION Complete physical exam done. Basic fasting labs drawn. I recommend using OTC EMLA for his calf pain. He should use OTC 1% hydrocortisone cream on his hands. 2. Encounter for immunization Z23 V03.89 INFLUENZA VIRUS VAC QUAD,SPLIT,PRESV FREE SYRINGE IM Influenza vaccine given in office. 3. Supraclavicular mass R22.2 786. 6 Urged him to schedule his neck CT. 4. Vitamin D deficiency E55.9 268.9 VITAMIN D, 25 HYDROXY Vitamin D ordered. This plan was reviewed with the patient and patient agrees. All questions were answered. This scribe documentation was reviewed by me and accurately reflects the examination and decisions made by me. This note will not be viewable in [address]

## 2018-11-14 NOTE — PROGRESS NOTES
Chief Complaint Patient presents with  Complete Physical  
  health screening for work. patient is fasting.

## 2018-11-15 LAB
25(OH)D3+25(OH)D2 SERPL-MCNC: 8.2 NG/ML (ref 30–100)
ALBUMIN SERPL-MCNC: 4.3 G/DL (ref 3.5–5.5)
ALBUMIN/GLOB SERPL: 1.3 {RATIO} (ref 1.2–2.2)
ALP SERPL-CCNC: 108 IU/L (ref 39–117)
ALT SERPL-CCNC: 21 IU/L (ref 0–44)
AST SERPL-CCNC: 24 IU/L (ref 0–40)
BILIRUB SERPL-MCNC: 0.6 MG/DL (ref 0–1.2)
BUN SERPL-MCNC: 9 MG/DL (ref 6–20)
BUN/CREAT SERPL: 9 (ref 9–20)
CALCIUM SERPL-MCNC: 9.3 MG/DL (ref 8.7–10.2)
CHLORIDE SERPL-SCNC: 104 MMOL/L (ref 96–106)
CHOLEST SERPL-MCNC: 220 MG/DL (ref 100–199)
CO2 SERPL-SCNC: 23 MMOL/L (ref 20–29)
CREAT SERPL-MCNC: 1.02 MG/DL (ref 0.76–1.27)
ERYTHROCYTE [DISTWIDTH] IN BLOOD BY AUTOMATED COUNT: 15.8 % (ref 12.3–15.4)
GLOBULIN SER CALC-MCNC: 3.2 G/DL (ref 1.5–4.5)
GLUCOSE SERPL-MCNC: 85 MG/DL (ref 65–99)
HCT VFR BLD AUTO: 42 % (ref 37.5–51)
HDLC SERPL-MCNC: 34 MG/DL
HGB BLD-MCNC: 14.3 G/DL (ref 13–17.7)
LDLC SERPL CALC-MCNC: 163 MG/DL (ref 0–99)
MCH RBC QN AUTO: 27.5 PG (ref 26.6–33)
MCHC RBC AUTO-ENTMCNC: 34 G/DL (ref 31.5–35.7)
MCV RBC AUTO: 81 FL (ref 79–97)
PLATELET # BLD AUTO: 338 X10E3/UL (ref 150–379)
POTASSIUM SERPL-SCNC: 4.1 MMOL/L (ref 3.5–5.2)
PROT SERPL-MCNC: 7.5 G/DL (ref 6–8.5)
RBC # BLD AUTO: 5.2 X10E6/UL (ref 4.14–5.8)
SODIUM SERPL-SCNC: 145 MMOL/L (ref 134–144)
TRIGL SERPL-MCNC: 115 MG/DL (ref 0–149)
TSH SERPL DL<=0.005 MIU/L-ACNC: 0.9 UIU/ML (ref 0.45–4.5)
VLDLC SERPL CALC-MCNC: 23 MG/DL (ref 5–40)
WBC # BLD AUTO: 7.7 X10E3/UL (ref 3.4–10.8)

## 2018-11-16 NOTE — PROGRESS NOTES
Jessica Stevens, your cholesterol has improved little  but still on the high side. I would suggest following low carb diet & weight loss otherwise we have to start medication. Also, vitamin D came back very low. You need (vitamin D3 ) 2000 I.U daily dose. It`s over the counter.

## 2018-11-20 ENCOUNTER — HOSPITAL ENCOUNTER (OUTPATIENT)
Dept: CT IMAGING | Age: 38
Discharge: HOME | End: 2018-11-20
Attending: INTERNAL MEDICINE
Payer: COMMERCIAL

## 2018-11-20 DIAGNOSIS — R22.1 MASS IN NECK: ICD-10-CM

## 2018-11-20 PROCEDURE — 74011636320 HC RX REV CODE- 636/320: Performed by: INTERNAL MEDICINE

## 2018-11-20 PROCEDURE — 74011250636 HC RX REV CODE- 250/636: Performed by: INTERNAL MEDICINE

## 2018-11-20 PROCEDURE — 70491 CT SOFT TISSUE NECK W/DYE: CPT

## 2018-11-20 RX ORDER — SODIUM CHLORIDE 0.9 % (FLUSH) 0.9 %
10 SYRINGE (ML) INJECTION ONCE
Status: COMPLETED | OUTPATIENT
Start: 2018-11-20 | End: 2018-11-20

## 2018-11-20 RX ORDER — SODIUM CHLORIDE 9 MG/ML
50 INJECTION, SOLUTION INTRAVENOUS ONCE
Status: COMPLETED | OUTPATIENT
Start: 2018-11-20 | End: 2018-11-20

## 2018-11-20 RX ADMIN — IOPAMIDOL 100 ML: 755 INJECTION, SOLUTION INTRAVENOUS at 08:39

## 2018-11-20 RX ADMIN — SODIUM CHLORIDE 50 ML/HR: 900 INJECTION, SOLUTION INTRAVENOUS at 08:39

## 2018-11-20 RX ADMIN — Medication 10 ML: at 08:39

## 2018-11-23 NOTE — PROGRESS NOTES
Michelle Hu, your CT scan showed enlarged lymph node could be an inflammation. We will keep an eye if no improvement then repeat CT in 6 months.

## 2019-01-24 DIAGNOSIS — G47.30 HYPERSOMNIA WITH SLEEP APNEA: ICD-10-CM

## 2019-01-24 DIAGNOSIS — G47.10 HYPERSOMNIA WITH SLEEP APNEA: ICD-10-CM

## 2019-02-01 RX ORDER — ARMODAFINIL 50 MG/1
TABLET ORAL
Qty: 30 TAB | Refills: 0 | Status: SHIPPED | OUTPATIENT
Start: 2019-02-01 | End: 2019-06-07 | Stop reason: ALTCHOICE

## 2019-02-01 NOTE — TELEPHONE ENCOUNTER
Orders Placed This Encounter    armodafinil 50 mg tab     Sig: TAKE 1 TABLET BY MOUTH EVERY DAY BEFORE BREAKFAST     Dispense:  30 Tab     Refill:  0

## 2019-02-06 NOTE — PROGRESS NOTES
PT DAILY TREATMENT NOTE 2-15    Patient Name: Debbie May  Date:2017  : 1980  [x]  Patient  Verified  Payor: Yousuf Connor / Plan: [employer] / Product Type: HMO /    In time: 2:10 PM Out time: 3:00 PM  Total Treatment Time (min): 50  Visit #: 3     Treatment Area: Left hip pain [M25.552]  Left leg pain [M79.605]    SUBJECTIVE  Pain Level (0-10 scale): 3/10  Any medication changes, allergies to medications, adverse drug reactions, diagnosis change, or new procedure performed?: [x] No    [] Yes (see summary sheet for update)  Subjective functional status/changes:   [] No changes reported  Pt states he has not performed his HEP since last visit due to busy schedule. States \"it helped some\" in regard to manual therapy last visit. Reports increased L knee pain this morning when descending stairs.      OBJECTIVE    Modality rationale: decrease inflammation and decrease pain to improve the patients ability to transfer sit to stand, walk, ascend/descend stairs w/out pain/limitation   Min Type Additional Details    [] Estim: []Att   []Unatt        []TENS instruct                  []IFC  []Premod   []NMES                     []Other:  []w/US   []w/ice   []w/heat  Position:  Location:    []  Traction: [] Cervical       []Lumbar                       [] Prone          []Supine                       []Intermittent   []Continuous Lbs:  [] before manual  [] after manual  []w/heat    []  Ultrasound: []Continuous   [] Pulsed at:                            []1MHz   []3MHz Location:  W/cm2:    []  Paraffin         Location:  []w/heat   10 [x]  Ice     []  Heat  []  Ice massage Position: sup  Location: L knee, L prox hip flexor     []  Laser  []  Other: Position:  Location:    []  Vasopneumatic Device Pressure:       [] lo [] med [] hi   Temperature:    [x] Skin assessment post-treatment:  [x]intact []redness- no adverse reaction    []redness  adverse reaction:     30 min Therapeutic Exercise:  [x] See flow sheet : review HEP,    Rationale: increase ROM, increase strength, improve coordination, improve balance and increase proprioception to improve the patients ability to transfer sit to stand, walk, ascend/descend stairs w/out pain/limitation    10 min Manual Therapy:  Use IASM \"scoop\" technique L patellar tendon, infrapatellar fat pad mobilization, manual and w/ IASM STM/TPR L prox hip flexor tendon, man hip flexor stretch in prone 30 sec x 3     Rationale: decrease pain, increase ROM, increase tissue extensibility, decrease trigger points and increase postural awareness  to improve the patients ability to transfer sit to stand, walk, ascend/descend stairs w/out pain/limitation          With   [x] TE   [] TA   [] neuro   [] other: Patient Education: [x] Review HEP    [] Progressed/Changed HEP based on:   [x] positioning   [x] body mechanics   [] transfers   [x] heat/ice application    [] other:      Other Objective/Functional Measures:   nt     Pain Level (0-10 scale) post treatment: 0/10    ASSESSMENT/Changes in Function:     Patient with continued TTP along patella tendon and L prox. Hip flexor tendon. Encouraged patient to be more compliant with HEP for maximum benefit from HEP. Patient will continue to benefit from skilled PT services to modify and progress therapeutic interventions, address functional mobility deficits, address ROM deficits, address strength deficits, analyze and address soft tissue restrictions, analyze and cue movement patterns, analyze and modify body mechanics/ergonomics and assess and modify postural abnormalities to attain remaining goals.      [x]  See Plan of Care  []  See progress note/recertification  []  See Discharge Summary         Progress towards goals / Updated goals:  nt    PLAN  []  Upgrade activities as tolerated     [x]  Continue plan of care  []  Update interventions per flow sheet       []  Discharge due to:_  []  Other:_      Jody Barker, PTA 11/14/2017  2:10 PM Patient

## 2019-02-08 ENCOUNTER — OFFICE VISIT (OUTPATIENT)
Dept: PRIMARY CARE CLINIC | Age: 39
End: 2019-02-08

## 2019-02-08 VITALS
RESPIRATION RATE: 18 BRPM | TEMPERATURE: 98.4 F | HEART RATE: 92 BPM | WEIGHT: 315 LBS | SYSTOLIC BLOOD PRESSURE: 130 MMHG | DIASTOLIC BLOOD PRESSURE: 85 MMHG | HEIGHT: 67 IN | BODY MASS INDEX: 49.44 KG/M2 | OXYGEN SATURATION: 97 %

## 2019-02-08 DIAGNOSIS — G47.33 OBSTRUCTIVE SLEEP APNEA SYNDROME: ICD-10-CM

## 2019-02-08 DIAGNOSIS — G47.419 CONTROLLED NARCOLEPSY: ICD-10-CM

## 2019-02-08 DIAGNOSIS — E66.01 MORBIDLY OBESE (HCC): ICD-10-CM

## 2019-02-08 DIAGNOSIS — F32.89 OTHER DEPRESSION: Primary | ICD-10-CM

## 2019-02-08 PROBLEM — F33.9 RECURRENT DEPRESSION (HCC): Status: RESOLVED | Noted: 2017-12-27 | Resolved: 2019-02-08

## 2019-02-08 RX ORDER — BUPROPION HYDROCHLORIDE 300 MG/1
300 TABLET ORAL
Qty: 30 TAB | Refills: 0 | Status: SHIPPED | OUTPATIENT
Start: 2019-02-08 | End: 2019-02-08 | Stop reason: SDUPTHER

## 2019-02-08 RX ORDER — BUPROPION HYDROCHLORIDE 300 MG/1
300 TABLET ORAL
Qty: 30 TAB | Refills: 0 | Status: SHIPPED | OUTPATIENT
Start: 2019-02-08 | End: 2019-03-08 | Stop reason: SDUPTHER

## 2019-02-08 RX ORDER — CHOLECALCIFEROL (VITAMIN D3) 125 MCG
CAPSULE ORAL
COMMUNITY

## 2019-02-08 RX ORDER — PHENTERMINE HYDROCHLORIDE 37.5 MG/1
37.5 TABLET ORAL
Qty: 30 TAB | Refills: 0 | Status: SHIPPED | OUTPATIENT
Start: 2019-02-08 | End: 2019-03-10

## 2019-02-08 NOTE — PROGRESS NOTES
Written by Jt Ludwig, as dictated by Dr. Lizzy Pike MD. 
[facility] Nanette Cam is a 44 y.o. male. HPI The patient presents today to discuss weight managements. He notes that he has been stress eating a lot. Sweets and fried foods are his weakness. The patient notes that he also likes to eat carbohydrates. He notes that he eats about 2,000-2,500 calories daily and he eats out a lot. Patient notes that he drinks about 2 ETOH drinks every few nights. Patient has tried phentermine in the past and does not believe it worked well from him. He also was prescribed Belviq, which he notes was too expensive. The pt notes that he has considered weight loss surgery. Denies constipation. Compliant on armodafinil 50 mg which was prescribed to help with sleeping in the afternoons. He notes that initially this medication helped, but notes that it is no longer providing relief as he still falls asleep. Compliant on BiPAP. Wellbutrin 100 mg BID has been helping with his depression. Patient Active Problem List  
Diagnosis Code  Hyperlipemia E78.5  Acne vulgaris L70.0  Depression F32.9  Chronic nonintractable headache R51  Obesity, morbid ([facility] [address] E66.01  
 Attention and concentration deficit R41.840 Current Outpatient Medications on File Prior to Visit Medication Sig Dispense Refill  cholecalciferol, vitamin D3, (VITAMIN D3) 2,000 unit tab Take  by mouth.  armodafinil 50 mg tab TAKE 1 TABLET BY MOUTH EVERY DAY BEFORE BREAKFAST 30 Tab 0 No current facility-administered medications on file prior to visit. Allergies Allergen Reactions  Sulfa (Sulfonamide Antibiotics) Nausea and Vomiting Past Medical History:  
Diagnosis Date  Mixed hyperlipidemia  Obesity  Persistent disorder of initiating or maintaining sleep  Sleep apnea Past Surgical History:  
Procedure Laterality Date [facility]  '05  
 rt. shoulder - \"slap\" injury Family History Problem Relation Age of Onset  Cancer Father   
     colon  Asthma Sister Social History Socioeconomic History  Marital status: SINGLE Spouse name: Single  Number of children: 0  
 Years of education: Not on file  Highest education level: Not on file Social Needs  Financial resource strain: Not on file  Food insecurity - worry: Not on file  Food insecurity - inability: Not on file  Transportation needs - medical: Not on file  Transportation needs - non-medical: Not on file Occupational History  Occupation: IT Tobacco Use  Smoking status: Never Smoker  Smokeless tobacco: Never Used Substance and Sexual Activity  Alcohol use: Yes Alcohol/week: 1.5 oz Types: 3 Standard drinks or equivalent per week  Drug use: No  
 Sexual activity: Yes  
  Partners: Female Other Topics Concern  Not on file Social History Narrative  Not on file Office Visit on 11/14/2018 Component Date Value Ref Range Status  Cholesterol, total 11/14/2018 220* 100 - 199 mg/dL Final  
 Triglyceride 11/14/2018 115  0 - 149 mg/dL Final  
 HDL Cholesterol 11/14/2018 34* >39 mg/dL Final  
 VLDL, calculated 11/14/2018 23  5 - 40 mg/dL Final  
 LDL, calculated 11/14/2018 163* 0 - 99 mg/dL Final  
 WBC 11/14/2018 7.7  3.4 - 10.8 x10E3/uL Final  
 RBC 11/14/2018 5.20  4. 14 - 5.80 x10E6/uL Final  
 HGB 11/14/2018 14.3  13.0 - 17.7 g/dL Final  
 HCT 11/14/2018 42.0  37.5 - 51.0 % Final  
 MCV 11/14/2018 81  79 - 97 fL Final  
 MCH 11/14/2018 27.5  26.6 - 33.0 pg Final  
 MCHC 11/14/2018 34.0  31.5 - 35.7 g/dL Final  
 RDW 11/14/2018 15.8* 12.3 - 15.4 % Final  
 PLATELET 00/73/3264 893  150 - 379 x10E3/uL Final  
 Glucose 11/14/2018 85  65 - 99 mg/dL Final  
 BUN 11/14/2018 9  6 - 20 mg/dL Final  
 Creatinine 11/14/2018 1.02  0.76 - 1.27 mg/dL Final  
  GFR est non-AA 11/14/2018 93  >59 mL/min/1.73 Final  
 GFR est AA 11/14/2018 107  >59 mL/min/1.73 Final  
 BUN/Creatinine ratio 11/14/2018 9  9 - 20 Final  
 Sodium 11/14/2018 145* 134 - 144 mmol/L Final  
 Potassium 11/14/2018 4.1  3.5 - 5.2 mmol/L Final  
 Chloride 11/14/2018 104  96 - 106 mmol/L Final  
 CO2 11/14/2018 23  20 - 29 mmol/L Final  
 Calcium 11/14/2018 9.3  8.7 - 10.2 mg/dL Final  
 Protein, total 11/14/2018 7.5  6.0 - 8.5 g/dL Final  
 Albumin 11/14/2018 4.3  3.5 - 5.5 g/dL Final  
 GLOBULIN, TOTAL 11/14/2018 3.2  1.5 - 4.5 g/dL Final  
 A-G Ratio 11/14/2018 1.3  1.2 - 2.2 Final  
 Bilirubin, total 11/14/2018 0.6  0.0 - 1.2 mg/dL Final  
 Alk. phosphatase 11/14/2018 108  39 - 117 IU/L Final  
 AST (SGOT) 11/14/2018 24  0 - 40 IU/L Final  
 ALT (SGPT) 11/14/2018 21  0 - 44 IU/L Final  
 TSH 11/14/2018 0.902  0.450 - 4.500 uIU/mL Final  
 VITAMIN D, 25-HYDROXY 11/14/2018 8.2* 30.0 - 100.0 ng/mL Final  
 Comment: Vitamin D deficiency has been defined by the [facility] practice guideline as a 
level of serum 25-OH vitamin D less than 20 ng/mL (1,2). The Endocrine Society went on to further define vitamin D 
insufficiency as a level between 21 and 29 ng/mL (2). 1. IOM ([place] of Medicine). 2010. Dietary reference 
   intakes for calcium and D. [facility]: The 
   [employer]. 2. Sheila MF, Cristina REDDY, Karen OLSON, et al. 
   Evaluation, treatment, and prevention of vitamin D 
   deficiency: an Endocrine Society clinical practice 
   guideline. JCEM. 2011 Jul; 96(7):1911-30. Review of Systems Constitutional: Positive for malaise/fatigue. Respiratory: Negative for cough and shortness of breath. Gastrointestinal: Negative for abdominal pain, constipation and heartburn. Musculoskeletal: Negative for joint pain and myalgias.   
Neurological: Negative for dizziness, tingling, sensory change, weakness and headaches. Psychiatric/Behavioral: Positive for depression. Negative for memory loss and substance abuse. Visit Vitals /85 (BP 1 Location: Left arm, BP Patient Position: Sitting) Pulse 92 Temp 98.4 °F (36.9 °C) (Oral) Resp 18 Ht 5' 7\" (1.702 m) Wt 325 lb (147.4 kg) SpO2 97% BMI 50.90 kg/m² Physical Exam  
Constitutional: He is oriented to person, place, and time. He appears well-developed. No distress. Morbidly obese HENT:  
Right Ear: External ear normal.  
Left Ear: External ear normal.  
Eyes: Conjunctivae and EOM are normal. Right eye exhibits no discharge. Left eye exhibits no discharge. Neck: Normal range of motion. Neck supple. Cardiovascular: Normal rate and regular rhythm. Pulmonary/Chest: Effort normal and breath sounds normal. He has no wheezes. Abdominal: Soft. Bowel sounds are normal. There is no tenderness. Lymphadenopathy:  
  He has no cervical adenopathy. Neurological: He is alert and oriented to person, place, and time. Skin: He is not diaphoretic. Psychiatric: He has a normal mood and affect. His behavior is normal.  
Nursing note and vitals reviewed. ASSESSMENT and PLAN 
  ICD-10-CM ICD-9-CM 1. Other depression F32.89 311 buPROPion XL (WELLBUTRIN XL) 300 mg XL tablet sent to pharmacy. Wellbutrin dosage increased to 300 mg once daily. 2. Morbidly obese ([facility]) E66.01 278.01 phentermine (ADIPEX-P) 37.5 mg tablet script given to patient. Phentermine prescribed for 1 month. Potential side effects were discussed. Patient has taken phentermine in the past. Instructed him to decrease his carbohydrate consumption and increase exercise. Discussed that I am willing to refer him to bariatric surgery if medication is unsuccessful. Discussed that increasing his Wellbutrin dosage should help to control cravings for food. 3. Controlled narcolepsy G47.419 347.00 Complaint on armodafinil 50 mg and will follow-up with sleep medicine. 4. Obstructive sleep apnea syndrome G47.33 327.23 Compliant on BiPAP. This plan was reviewed with the patient and patient agrees. All questions were answered. This scribe documentation was reviewed by me and accurately reflects the examination and decisions made by me. This note will not be viewable in [address]

## 2019-02-08 NOTE — PROGRESS NOTES
Visit Vitals /85 (BP 1 Location: Left arm, BP Patient Position: Sitting) Pulse 92 Temp 98.4 °F (36.9 °C) (Oral) Resp 18 Ht 5' 7\" (1.702 m) Wt 325 lb (147.4 kg) SpO2 97% BMI 50.90 kg/m² Chief Complaint Patient presents with  Weight Management Discuss Options 1. Have you been to the ER, urgent care clinic since your last visit? Hospitalized since your last visit? Denies 2. Have you seen or consulted any other health care providers outside of the [address] [place] [address] since your last visit? Include any pap smears or colon screening. Denies

## 2019-02-12 ENCOUNTER — OFFICE VISIT (OUTPATIENT)
Dept: SLEEP MEDICINE | Age: 39
End: 2019-02-12

## 2019-02-12 VITALS
DIASTOLIC BLOOD PRESSURE: 79 MMHG | BODY MASS INDEX: 49.44 KG/M2 | WEIGHT: 315 LBS | OXYGEN SATURATION: 96 % | HEART RATE: 102 BPM | SYSTOLIC BLOOD PRESSURE: 127 MMHG | HEIGHT: 67 IN

## 2019-02-12 DIAGNOSIS — G47.419 PRIMARY NARCOLEPSY WITHOUT CATAPLEXY: Primary | ICD-10-CM

## 2019-02-12 DIAGNOSIS — G47.10 HYPERSOMNIA WITH SLEEP APNEA: ICD-10-CM

## 2019-02-12 DIAGNOSIS — G47.33 OSA (OBSTRUCTIVE SLEEP APNEA): ICD-10-CM

## 2019-02-12 DIAGNOSIS — G47.30 HYPERSOMNIA WITH SLEEP APNEA: ICD-10-CM

## 2019-02-12 RX ORDER — ARMODAFINIL 50 MG/1
50 TABLET ORAL DAILY
Qty: 30 TAB | Refills: 2 | Status: SHIPPED | OUTPATIENT
Start: 2019-02-12 | End: 2019-12-27 | Stop reason: SDUPTHER

## 2019-02-12 NOTE — PATIENT INSTRUCTIONS
[facility] [place]. [place], [address]  Tel.  [phone]  Fax. 100 [facility] 60  [place], [address]  Tel.  [phone]  Fax. [phone] [address]  Tel.  [phone]  Fax. [phone]       Narcolepsy: After Your Visit  Your Care Instructions  Everybody gets a little sleepy once in a while, during a long car ride or other times when you want to be alert. But some people cannot control their sleepiness. It is no fun to be in the middle of your workday or driving your car down the street and have an overwhelming desire to sleep. This condition is called narcolepsy. Doctors do not know what causes narcolepsy. Your doctor may ask you to keep a sleep diary for a couple of weeks. It will help you and your doctor decide on treatment. It often helps to take limited naps during the day. It also helps to create a good mood and place for nighttime sleep. Your doctor may recommend medicine to help you stay awake during the day or sleep at night. Follow-up care is a key part of your treatment and safety. Be sure to make and go to all appointments, and call your doctor if you are having problems. It's also a good idea to know your test results and keep a list of the medicines you take. How can you care for yourself at home? · Try to take 2 or 3 short naps at regular times during the day. After a nap, always give yourself time to become alert before you drive a car or do anything that might cause an accident. · Take your medicines exactly as prescribed. Call your doctor if you think you are having a problem with your medicine. You may need to try several medicines before you find the one that works best for you. · Try to improve your nighttime sleep habits. Here are a few of the things you could do:  ¨ Go to bed only when you are sleepy, and get up at the same time every day, even if you do not feel rested.  This might help you sleep well the next night and the night after that. ¨ If you lie awake for longer than 15 minutes, get up, leave the bedroom, and do something quiet, such as read, until you feel sleepy again. ¨ Avoid drinking or eating anything with caffeine after 3 p.m. This includes coffee, tea, cola drinks, and chocolate. ¨ Make sure your bedroom is not too hot or too cold, and keep it quiet and dark. ¨ Make sure your mattress provides good support. · Be kind to your body:  ¨ Relieve tension with exercise or a massage. ¨ Learn and do relaxation techniques. ¨ Avoid alcohol, caffeine, nicotine, and illegal drugs. They can increase your anxiety level and cause sleep problems. · Get light exercise daily. Gentle stretching, light aerobics, swimming, walking, and riding a bicycle can help to keep you going during the day and to sleep well at night. · Eat a healthy diet. You may feel better if you avoid heavy meals and eat more fruits and vegetables. · Do not use over-the-counter sleeping pills. They can make your sleep restless. · Ask your doctor if any medicines you take could cause sleepiness. For example, cold and allergy medicines can make you drowsy. · Consider joining a support group with people who have narcolepsy or other sleep problems. These groups can be a good source of tips for what to do. Also, it can be comforting to talk to people who face similar challenges. Your doctor can tell you how to contact a support group. When should you call for help? Call your doctor now or seek immediate medical care if:  · You passed out (lost consciousness). · You cannot use your muscles. This may happen very briefly, sometimes after you laugh or are angry, and may only affect part of your body. Watch closely for changes in your health, and be sure to contact your doctor if:  · Your sleepiness continues to get worse. Where can you learn more?    Go to [employer]  Enter V069 in the search box to learn more about \"Narcolepsy: After Your Visit. \"   © 8423-1652 Healthwise, Incorporated. Care instructions adapted under license by [facility] (which disclaims liability or warranty for this information). This care instruction is for use with your licensed healthcare professional. If you have questions about a medical condition or this instruction, always ask your healthcare professional. [address] any warranty or liability for your use of this information. Content Version: 9.0.40513; Last Revised: September 15, 2009  PROPER SLEEP HYGIENE    What to avoid  · Do not have drinks with caffeine, such as coffee or black tea, for 8 hours before bed. · Do not smoke or use other types of tobacco near bedtime. Nicotine is a stimulant and can keep you awake. · Avoid drinking alcohol late in the evening, because it can cause you to wake in the middle of the night. · Do not eat a big meal close to bedtime. If you are hungry, eat a light snack. · Do not drink a lot of water close to bedtime, because the need to urinate may wake you up during the night. · Do not read or watch TV in bed. Use the bed only for sleeping and sexual activity. What to try  · Go to bed at the same time every night, and wake up at the same time every morning. Do not take naps during the day. · Keep your bedroom quiet, dark, and cool. · Get regular exercise, but not within 3 to 4 hours of your bedtime. .  · Sleep on a comfortable pillow and mattress. · If watching the clock makes you anxious, turn it facing away from you so you cannot see the time. · If you worry when you lie down, start a worry book. Well before bedtime, write down your worries, and then set the book and your concerns aside. · Try meditation or other relaxation techniques before you go to bed. · If you cannot fall asleep, get up and go to another room until you feel sleepy. Do something relaxing.  Repeat your bedtime routine before you go to bed again.  · Make your house quiet and calm about an hour before bedtime. Turn down the lights, turn off the TV, log off the computer, and turn down the volume on music. This can help you relax after a busy day. Drowsy Driving: The [employer] cites drowsiness as a causing factor in more than 113,397 police reported crashes annually, resulting in 76,000 injuries and 1,500 deaths. Other surveys suggest 55% of people polled have driven while drowsy in the past year, 23% had fallen asleep but not crashed, 3% crashed, and 2% had and accident due to drowsy driving. Who is at risk? Young Drivers: One study of drowsy driving accidents states that 55% of the drivers were under 25 years. Of those, 75% were male. Shift Workers and Travelers: People who work overnight or travel across time zones frequently are at higher risk of experiencing Circadian Rhythm Disorders. They are trying to work and function when their body is programed to sleep. Sleep Deprived: Lack of sleep has a serious impact on your ability to pay attention or focus on a task. Consistently getting less than the average of 8 hours your body needs creates partial or cumulative sleep deprivation. Untreated Sleep Disorders: Sleep Apnea, Narcolepsy, R.L.S., and other sleep disorders (untreated) prevent a person from getting enough restful sleep. This leads to excessive daytime sleepiness and increases the risk for drowsy driving accidents by up to 7 times. Medications / Alcohol: Even over the counter medications can cause drowsiness. Medications that impair a drivers attention should have a warning label. Alcohol naturally makes you sleepy and on its own can cause accidents. Combined with excessive drowsiness its effects are amplified.    Signs of Drowsy Driving:   * You don't remember driving the last few miles   * You may drift out of your mariella   * You are unable to focus and your thoughts wander   * You may yawn more often than normal   * You have difficulty keeping your eyes open / nodding off   * Missing traffic signs, speeding, or tailgating  Prevention-   Good sleep hygiene, lifestyle and behavioral choices have the most impact on drowsy driving. There is no substitute for sleep and the average person requires 8 hours nightly. If you find yourself driving drowsy, stop and sleep. Consider the sleep hygiene tips provided during your visit as well. Medication Refill Policy: Refills for all medications require 1 week advance notice. Please have your pharmacy fax a refill request. We are unable to fax, or call in \"controled substance\" medications and you will need to pick these prescriptions up from our office. [employer] Activation    Thank you for requesting access to [employer]. Please follow the instructions below to securely access and download your online medical record. [employer] allows you to send messages to your doctor, view your test results, renew your prescriptions, schedule appointments, and more. How Do I Sign Up? 1. In your internet browser, go to [email or web address]. 2. Click on the First Time User? Click Here link in the Sign In box. You will see the New Member Sign Up page. 3. Enter your [employer] Access Code exactly as it appears below. You will not need to use this code after youve completed the sign-up process. If you do not sign up before the expiration date, you must request a new code. [employer] Access Code: Activation code not generated  Current [employer] Status: Active (This is the date your [employer] access code will )    4. Enter the last four digits of your Social Security Number (xxxx) and Date of Birth (mm/dd/yyyy) as indicated and click Submit. You will be taken to the next sign-up page. 5. Create a [employer] ID. This will be your [employer] login ID and cannot be changed, so think of one that is secure and easy to remember. 6. Create a [employer] password.  You can change your password at any time. 7. Enter your Password Reset Question and Answer. This can be used at a later time if you forget your password. 8. Enter your e-mail address. You will receive e-mail notification when new information is available in [address] 9. Click Sign Up. You can now view and download portions of your medical record. 10. Click the Download Summary menu link to download a portable copy of your medical information. Additional Information    If you have questions, please call [phone]. Remember, "[employer]" is NOT to be used for urgent needs. For medical emergencies, dial 911.

## 2019-02-12 NOTE — PROGRESS NOTES
[address] [place]. [place], [address]  Tel.  [phone]  Fax. 100 [facility]  [place], [address]  Tel.  [phone]  Fax. [phone] [address]   Tel.  [phone]  Fax. [phone]     S>Shorty Gacría is a 44 y.o. male seen for a positive airway pressure follow-up. He reports no problems using the device. He is 100% compliant over the past 30 days. The following problems are identified:    Drowsiness no Problems exhaling no   Snoring no Forget to put on no   Mask Comfortable yes Can't fall asleep no   Dry Mouth no Mask falls off no   Air Leaking no Frequent awakenings no         He admits that his sleep has improved on PAP therapy using nasal pillows mask and heated tubing. He reports of improved alertness initially on armodafinil but become increasing sleepy during the afternoons since December 2018. He denies of symptoms indicative of cataplexy, sleep paralysis or hypnagogic hallucinations. He was recently started on phentermine for weight loss. Allergies   Allergen Reactions    Sulfa (Sulfonamide Antibiotics) Nausea and Vomiting       He has a current medication list which includes the following prescription(s): cholecalciferol (vitamin d3), phentermine, bupropion xl, and armodafinil. Betha Lute He  has a past medical history of Mixed hyperlipidemia, Obesity, Persistent disorder of initiating or maintaining sleep, and Sleep apnea.     [place] Sleepiness Score: 11   and Modified F.O.S.Q. Score Total / 2: 10      O>    Visit Vitals  /79   Pulse (!) 102   Ht 5' 7\" (1.702 m)   Wt 320 lb (145.2 kg)   SpO2 96%   BMI 50.12 kg/m²         General:   Not in acute distress   Eyes:  Anicteric sclerae, no obvious strabismus   Nose:  No obvious nasal septum deviation    Oropharynx:   Class 4 oropharyngeal outlet, thick tongue base, uvula not seen due to low-lying soft palate, narrow tonsilo-pharyngeal pilars   Tonsils: tonsils are not visualized due to low-lying soft palate   Neck:   midline trachea   Chest/Lungs:  Equal lung expansion, clear on auscultation    CVS:  Normal rate, regular rhythm; no JVD   Skin:  Warm to touch; no obvious rashes   Neuro:  No focal deficits ; no obvious tremor    Psych:  Normal affect,  normal countenance;           A>    ICD-10-CM ICD-9-CM    1. Primary narcolepsy without cataplexy G47.419 347.00 7-DRUG SCREEN, UR      MULTIPLE SLEEP LATENCY TEST      SLEEP LAB (PAP TITRATION)   2. LASHAUN (obstructive sleep apnea) G47.33 327.23 SLEEP LAB (PAP TITRATION)   3. BMI 50.0-59.9, adult [facility] Z68.43 V85.43      AHI = 37.5 (2017). On Bi - Level :  15/09 cmH2O. Compliant:      yes    Therapeutic Response:  Positive    P>    * Device pressure change to Bi - Level  15/09 cmH2O. * Patient agrees to PSG / MSLT to evaluate for presence of narcolepsy. Orders Placed This Encounter    MULTIPLE SLEEP LATENCY TEST     Standing Status:   Future     Standing Expiration Date:   8/12/2019    7-DRUG SCREEN, UR    SLEEP LAB (PAP TITRATION)     Standing Status:   Future     Standing Expiration Date:   8/12/2019     Scheduling Instructions:      Perform Bi-level Titration at 15/09 cmH2O. Order Specific Question:   Reason for Exam     Answer:   Narcolepsy       *   Orders Placed This Encounter    MULTIPLE SLEEP LATENCY TEST    7-DRUG SCREEN, UR    SLEEP LAB (PAP TITRATION)    armodafinil 50 mg tab    was prescribed. He was in formed on this medications including side effects and adverse reactions profile. Interaction between medications and potential side effects reviewed with patient. * We have recommended a dedicated weight loss through appropriate diet and an exercise regiment as significant weight reduction has been shown to reduce severity of obstructive sleep apnea. * Follow-up Disposition:  Return in about 1 year (around 2/12/2020), or if symptoms worsen or fail to improve.     * He was asked to contact our office for any problems regarding PAP therapy. * Counseling was provided regarding the importance of regular PAP use and on proper sleep hygiene and safe driving. * Re-enforced proper and regular cleaning for the device. Thank you for allowing us to participate in your patient's medical care. Aubrie Edwards MD, [facility]  Electronically signed.  02/12/19

## 2019-03-08 ENCOUNTER — OFFICE VISIT (OUTPATIENT)
Dept: PRIMARY CARE CLINIC | Age: 39
End: 2019-03-08

## 2019-03-08 VITALS
OXYGEN SATURATION: 100 % | DIASTOLIC BLOOD PRESSURE: 76 MMHG | BODY MASS INDEX: 49.44 KG/M2 | HEART RATE: 89 BPM | SYSTOLIC BLOOD PRESSURE: 126 MMHG | RESPIRATION RATE: 20 BRPM | TEMPERATURE: 98.3 F | WEIGHT: 315 LBS | HEIGHT: 67 IN

## 2019-03-08 DIAGNOSIS — F32.89 OTHER DEPRESSION: ICD-10-CM

## 2019-03-08 DIAGNOSIS — E66.01 MORBIDLY OBESE (HCC): ICD-10-CM

## 2019-03-08 DIAGNOSIS — F41.9 ANXIETY AND DEPRESSION: ICD-10-CM

## 2019-03-08 DIAGNOSIS — G47.419 PRIMARY NARCOLEPSY WITHOUT CATAPLEXY: Primary | ICD-10-CM

## 2019-03-08 DIAGNOSIS — F32.A ANXIETY AND DEPRESSION: ICD-10-CM

## 2019-03-08 RX ORDER — PHENTERMINE HYDROCHLORIDE 37.5 MG/1
37.5 TABLET ORAL
Qty: 30 TAB | Refills: 0 | Status: SHIPPED | OUTPATIENT
Start: 2019-03-08 | End: 2019-04-07

## 2019-03-08 RX ORDER — BUPROPION HYDROCHLORIDE 300 MG/1
300 TABLET ORAL
Qty: 90 TAB | Refills: 0 | Status: SHIPPED | OUTPATIENT
Start: 2019-03-08 | End: 2019-04-08 | Stop reason: SDUPTHER

## 2019-03-08 NOTE — PROGRESS NOTES
Written by Lawanna Kanner, as dictated by Dr. Jaylene Garcia MD.    Leland Beltran is a 44 y.o. male. HPI  The patient presents today for weight management and medication evaluation. Pt weighs 317 lbs today and has lost weight from 325 lbs on 02/08/2019. He was started on phentermine 37.5 mg on 02/08/2019. Denies palpitations, consitpation. The patient notes that he is no longer having cravings or headaches caused by not eating while on phentermine. Pt eats breakfast and normally forgets to eat lunch. The patient notes that he has been drinking more water. He has been monitoring his weight at home. Occasionally he drinks EtOH but notes that he has not had any in the past 30 days. The patient has started exercising more and notes that he gets 5,000-6,000 steps daily. Rarely he gets 10,000 steps per day. Patient was seen by Dr. Kristal Mc (sleep medicine). Compliant on BiPAP but Dr. Kristal Mc ordered a daytime study as he is still experiencing sleepiness during the day. The patient notes that he stopped taking armodafinil 50 mg. Compliant on Wellbutrin  mg and doing well. No HX of glaucoma. Patient Active Problem List   Diagnosis Code    Hyperlipemia E78.5    Acne vulgaris L70.0    Depression F32.9    Chronic nonintractable headache R51    Obesity, morbid ([facility] [address] E66.01    Attention and concentration deficit R41.840        Current Outpatient Medications on File Prior to Visit   Medication Sig Dispense Refill    cholecalciferol, vitamin D3, (VITAMIN D3) 2,000 unit tab Take  by mouth.  phentermine (ADIPEX-P) 37.5 mg tablet Take 1 Tab by mouth every morning for 30 days. Max Daily Amount: 37.5 mg. 30 Tab 0    buPROPion XL (WELLBUTRIN XL) 300 mg XL tablet Take 1 Tab by mouth every morning for 30 days. 30 Tab 0    armodafinil 50 mg tab TAKE 1 TABLET BY MOUTH EVERY DAY BEFORE BREAKFAST 30 Tab 0    armodafinil 50 mg tab Take 50 mg by mouth daily.  Max Daily Amount: 50 mg. 30 Tab 2     No current facility-administered medications on file prior to visit. Allergies   Allergen Reactions    Sulfa (Sulfonamide Antibiotics) Nausea and Vomiting       Past Medical History:   Diagnosis Date    Mixed hyperlipidemia     Obesity     Persistent disorder of initiating or maintaining sleep     Sleep apnea        Past Surgical History:   Procedure Laterality Date    HX ORTHOPAEDIC  '05    rt. shoulder - \"slap\" injury       Family History   Problem Relation Age of Onset    Cancer Father         colon    Asthma Sister        Social History     Socioeconomic History    Marital status: SINGLE     Spouse name: Single    Number of children: 0    Years of education: Not on file    Highest education level: Not on file   Social Needs    Financial resource strain: Not on file    Food insecurity - worry: Not on file    Food insecurity - inability: Not on file   Vertical Knowledge needs - medical: Not on file   Vertical Knowledge needs - non-medical: Not on file   Occupational History    Occupation: IT   Tobacco Use    Smoking status: Never Smoker    Smokeless tobacco: Never Used   Substance and Sexual Activity    Alcohol use: Yes     Alcohol/week: 1.5 oz     Types: 3 Standard drinks or equivalent per week    Drug use: No    Sexual activity: Yes     Partners: Female   Other Topics Concern    Not on file   Social History Narrative    Not on file       Review of Systems   Constitutional: Positive for malaise/fatigue. Respiratory: Negative for cough and shortness of breath. Cardiovascular: Negative for chest pain and palpitations. Gastrointestinal: Negative for abdominal pain, constipation and heartburn. Musculoskeletal: Negative for joint pain and myalgias. Neurological: Negative for dizziness, tingling, sensory change, weakness and headaches. Psychiatric/Behavioral: Positive for depression. Negative for memory loss and substance abuse.  The patient is nervous/anxious. Visit Vitals  /76 (BP 1 Location: Left arm, BP Patient Position: Sitting)   Pulse 89   Temp 98.3 °F (36.8 °C) (Oral)   Resp 20   Ht 5' 7\" (1.702 m)   Wt 317 lb (143.8 kg)   SpO2 100%   BMI 49.65 kg/m²       Physical Exam   Constitutional: He is oriented to person, place, and time. He appears well-developed. No distress. Morbidly obese   HENT:   Right Ear: External ear normal.   Left Ear: External ear normal.   Eyes: Conjunctivae and EOM are normal. Right eye exhibits no discharge. Left eye exhibits no discharge. Neck: Normal range of motion. Neck supple. Cardiovascular: Normal rate, regular rhythm and normal heart sounds. Pulmonary/Chest: Effort normal and breath sounds normal. He has no wheezes. Abdominal: Soft. Bowel sounds are normal. There is no tenderness. Lymphadenopathy:     He has no cervical adenopathy. Neurological: He is alert and oriented to person, place, and time. Skin: He is not diaphoretic. Psychiatric: He has a normal mood and affect. His behavior is normal.   Nursing note and vitals reviewed. ASSESSMENT and PLAN    ICD-10-CM ICD-9-CM    1. Primary narcolepsy without cataplexy G47.419 347.00 Followed by Dr. Bryan Horn (sleep medicine) and will have further testing. He should wait to resume armodafinil 50 mg until after finishing phentermine. 2. Anxiety and depression F41.9 300.00 Wellbutrin  mg refilled. Doing well on current regimen. F32.9 311    3. Morbidly obese (HCC) E66.01 278.01 AMB POC EKG ROUTINE W/ 12 LEADS, INTER & REP      phentermine (ADIPEX-P) 37.5 mg tablet script given to patient. EKG in office found normal sinus rhythm. Phentermine 37.5 mg refilled x 1 month. No history of glaucoma. Commended on his weight loss. Encouraged diet and exercise. Advised 5,000-7,000 steps daily for a healthy lifestyle, but 10,000 steps daily for weight loss.    4. Other depression F32.89 311 buPROPion XL (WELLBUTRIN XL) 300 mg XL tablet sent to pharmacy. Wellbutrin  mg refilled. This plan was reviewed with the patient and patient agrees. All questions were answered. This scribe documentation was reviewed by me and accurately reflects the examination and decisions made by me. This note will not be viewable in [address]

## 2019-03-08 NOTE — PROGRESS NOTES
Visit Vitals  /76 (BP 1 Location: Left arm, BP Patient Position: Sitting)   Pulse 89   Temp 98.3 °F (36.8 °C) (Oral)   Resp 20   Ht 5' 7\" (1.702 m)   Wt 317 lb (143.8 kg)   SpO2 100%   BMI 49.65 kg/m²         Chief Complaint   Patient presents with    Weight Management    Medication Evaluation     1 month phentermine                1. Have you been to the ER, urgent care clinic since your last visit? Hospitalized since your last visit? Denies     2. Have you seen or consulted any other health care providers outside of the [address] [place] [address] since your last visit? Include any pap smears or colon screening.  Sleep Study

## 2019-04-05 ENCOUNTER — OFFICE VISIT (OUTPATIENT)
Dept: PRIMARY CARE CLINIC | Age: 39
End: 2019-04-05

## 2019-04-05 VITALS
SYSTOLIC BLOOD PRESSURE: 119 MMHG | TEMPERATURE: 98.2 F | RESPIRATION RATE: 18 BRPM | BODY MASS INDEX: 49.19 KG/M2 | HEART RATE: 95 BPM | WEIGHT: 313.4 LBS | OXYGEN SATURATION: 99 % | DIASTOLIC BLOOD PRESSURE: 81 MMHG | HEIGHT: 67 IN

## 2019-04-05 DIAGNOSIS — F32.89 OTHER DEPRESSION: Primary | ICD-10-CM

## 2019-04-05 DIAGNOSIS — E66.01 OBESITY, MORBID (HCC): ICD-10-CM

## 2019-04-05 DIAGNOSIS — G47.419 PRIMARY NARCOLEPSY WITHOUT CATAPLEXY: ICD-10-CM

## 2019-04-05 RX ORDER — PHENTERMINE HYDROCHLORIDE 37.5 MG/1
37.5 TABLET ORAL
Qty: 30 TAB | Refills: 0 | Status: SHIPPED | OUTPATIENT
Start: 2019-04-05 | End: 2019-05-05

## 2019-04-08 DIAGNOSIS — F32.89 OTHER DEPRESSION: ICD-10-CM

## 2019-04-08 RX ORDER — BUPROPION HYDROCHLORIDE 300 MG/1
300 TABLET ORAL
Qty: 90 TAB | Refills: 0 | Status: SHIPPED | OUTPATIENT
Start: 2019-04-08 | End: 2019-07-07

## 2019-04-28 ENCOUNTER — HOSPITAL ENCOUNTER (OUTPATIENT)
Dept: SLEEP MEDICINE | Age: 39
Discharge: HOME | End: 2019-04-28
Attending: INTERNAL MEDICINE
Payer: COMMERCIAL

## 2019-04-28 VITALS
HEART RATE: 117 BPM | BODY MASS INDEX: 49.44 KG/M2 | DIASTOLIC BLOOD PRESSURE: 87 MMHG | SYSTOLIC BLOOD PRESSURE: 132 MMHG | OXYGEN SATURATION: 95 % | TEMPERATURE: 97.8 F | HEIGHT: 67 IN | WEIGHT: 315 LBS

## 2019-04-28 DIAGNOSIS — G47.33 OSA (OBSTRUCTIVE SLEEP APNEA): ICD-10-CM

## 2019-04-28 DIAGNOSIS — G47.419 PRIMARY NARCOLEPSY WITHOUT CATAPLEXY: ICD-10-CM

## 2019-04-28 PROCEDURE — 95811 POLYSOM 6/>YRS CPAP 4/> PARM: CPT | Performed by: INTERNAL MEDICINE

## 2019-04-29 ENCOUNTER — HOSPITAL ENCOUNTER (OUTPATIENT)
Dept: SLEEP MEDICINE | Age: 39
Discharge: HOME | End: 2019-04-29
Attending: INTERNAL MEDICINE
Payer: COMMERCIAL

## 2019-04-29 ENCOUNTER — TELEPHONE (OUTPATIENT)
Dept: SLEEP MEDICINE | Age: 39
End: 2019-04-29

## 2019-04-29 DIAGNOSIS — G47.419 PRIMARY NARCOLEPSY WITHOUT CATAPLEXY: ICD-10-CM

## 2019-04-29 PROCEDURE — 95805 MULTIPLE SLEEP LATENCY TEST: CPT | Performed by: INTERNAL MEDICINE

## 2019-05-01 LAB
AMPHETAMINES UR QL SCN: NEGATIVE NG/ML
BARBITURATES UR QL SCN: NEGATIVE NG/ML
BENZODIAZ UR QL: NEGATIVE NG/ML
BZE UR QL: NEGATIVE NG/ML
CANNABINOIDS UR QL SCN: NEGATIVE NG/ML
DRUG SCREEN COMMENT:, 753798: NORMAL
OPIATES UR QL: NEGATIVE NG/ML
PCP UR QL: NEGATIVE NG/ML

## 2019-05-07 ENCOUNTER — OFFICE VISIT (OUTPATIENT)
Dept: SLEEP MEDICINE | Age: 39
End: 2019-05-07

## 2019-05-07 VITALS
BODY MASS INDEX: 49.44 KG/M2 | HEIGHT: 67 IN | HEART RATE: 103 BPM | DIASTOLIC BLOOD PRESSURE: 85 MMHG | SYSTOLIC BLOOD PRESSURE: 132 MMHG | OXYGEN SATURATION: 96 % | WEIGHT: 315 LBS

## 2019-05-07 DIAGNOSIS — G47.00 INSOMNIA WITH SLEEP APNEA: Primary | ICD-10-CM

## 2019-05-07 DIAGNOSIS — G47.30 INSOMNIA WITH SLEEP APNEA: Primary | ICD-10-CM

## 2019-05-07 DIAGNOSIS — Z86.59 H/O: DEPRESSION: ICD-10-CM

## 2019-05-07 RX ORDER — ZOLPIDEM TARTRATE 10 MG/1
10 TABLET ORAL
Qty: 30 TAB | Refills: 0 | Status: SHIPPED | OUTPATIENT
Start: 2019-05-07 | End: 2019-06-07 | Stop reason: SDUPTHER

## 2019-05-07 NOTE — PROGRESS NOTES
[address] [facility] [place]. [place], [address]  Tel.  [phone]  Fax. [address] [facility] [address]  [place], [address] [facility]  Tel.  [phone]  Fax. [phone] [address]  Tel.  [phone]  Fax. [phone]     S>Shorty Bertrand is a 44 y.o. male seen for a positive airway pressure follow-up. He reports no problems using the device. He is 97% compliant over the past 30 days. The following problems are identified:    Drowsiness no Problems exhaling no   Snoring no Forget to put on no   Mask Comfortable yes Can't fall asleep no   Dry Mouth no Mask falls off no   Air Leaking no Frequent awakenings no       He admits that his sleep has improved on PAP therapy using nasal mask and regular tubing. He was prescribed armodafinil but decided not to get the prescription filled due to being on phentermine. He reports of not being able to fall asleep for up to two hours after initializing PAP Therapy, this has improved since change on phentermine intake to every other day. PSG(04-): Apnea/Hypopnea index of 3.0 which indicates no signifiant sleep apnea on PAP Therapy. MSLT(04-): No significant daytime sleepiness as evidenced by an average latency of 16 minutes, Stage REM was not noted. Allergies   Allergen Reactions    Sulfa (Sulfonamide Antibiotics) Nausea and Vomiting       He has a current medication list which includes the following prescription(s): zolpidem, bupropion xl, cholecalciferol (vitamin d3), armodafinil, and armodafinil. Richards Yen He  has a past medical history of Mixed hyperlipidemia, Obesity, Persistent disorder of initiating or maintaining sleep, and Sleep apnea.       O>    Visit Vitals  /85   Pulse (!) 103   Ht 5' 7\" (1.702 m)   Wt 316 lb (143.3 kg)   SpO2 96%   BMI 49.49 kg/m²         General:   Not in acute distress   Eyes:  Anicteric sclerae, no obvious strabismus   Nose:  No obvious nasal septum deviation Oropharynx:   Class 4 oropharyngeal outlet, thick tongue base, uvula not seen due to low-lying soft palate, narrow tonsilo-pharyngeal pilars   Tonsils:   tonsils are not visualized due to low-lying soft palate   Neck:   midline trachea   Chest/Lungs:  Equal lung expansion, clear on auscultation    CVS:  Normal rate, regular rhythm; no JVD   Skin:  Warm to touch; no obvious rashes   Neuro:  No focal deficits ; no obvious tremor    Psych:  Normal affect,  normal countenance;           A>    ICD-10-CM ICD-9-CM    1. Insomnia with sleep apnea G47.00 780.51 zolpidem (AMBIEN) 10 mg tablet    G47.30     2. BMI 45.0-49.9, adult (HCC) Z68.42 V85.42    3. H/O: depression Z86.59 V11.8        AHI = 37.5 (2017). On Bi - Level :  15/09 cmH2O. Compliant:      yes    Therapeutic Response:  Positive    P>        Orders Placed This Encounter    zolpidem (AMBIEN) 10 mg tablet     Sig: Take 1 Tab by mouth nightly as needed for Sleep. Max Daily Amount: 10 mg. Dispense:  30 Tab     Refill:  0     He was informed on this medications including side effects and adverse reactions profile. * We have recommended a dedicated weight loss through appropriate diet and an exercise regiment as significant weight reduction has been shown to reduce severity of obstructive sleep apnea. *   Follow-up and Dispositions    · Return in about 1 year (around 5/7/2020), or if symptoms worsen or fail to improve. * He was asked to contact our office for any problems regarding PAP therapy. * Counseling was provided regarding the importance of regular PAP use and on proper sleep hygiene and safe driving. * Re-enforced proper and regular cleaning for the device. Thank you for allowing us to participate in your patient's medical care. Shawna Araya MD, FAASM  Electronically signed.  05/07/19

## 2019-05-07 NOTE — PATIENT INSTRUCTIONS
[address] [place]. [place], [address]  Tel.  [phone]  Fax. 100 [facility] [address]  [place], [address] [facility]  Tel.  [phone]  Fax. [phone] [address]  Tel.  [phone]  Fax. [phone]     Learning About CPAP for Sleep Apnea  What is CPAP? CPAP is a small machine that you use at home every night while you sleep. It increases air pressure in your throat to keep your airway open. When you have sleep apnea, this can help you sleep better so you feel much better. CPAP stands for \"continuous positive airway pressure. \"  The CPAP machine will have one of the following:  · A mask that covers your nose and mouth  · Prongs that fit into your nose  · A mask that covers your nose only, the most common type. This type is called NCPAP. The N stands for \"nasal.\"  Why is it done? CPAP is usually the best treatment for obstructive sleep apnea. It is the first treatment choice and the most widely used. Your doctor may suggest CPAP if you have:  · Moderate to severe sleep apnea. · Sleep apnea and coronary artery disease (CAD) or heart failure. How does it help? · CPAP can help you have more normal sleep, so you feel less sleepy and more alert during the daytime. · CPAP may help keep heart failure or other heart problems from getting worse. · NCPAP may help lower your blood pressure. · If you use CPAP, your bed partner may also sleep better because you are not snoring or restless. What are the side effects? Some people who use CPAP have:  · A dry or stuffy nose and a sore throat. · Irritated skin on the face. · Sore eyes. · Bloating. If you have any of these problems, work with your doctor to fix them. Here are some things you can try:  · Be sure the mask or nasal prongs fit well. · See if your doctor can adjust the pressure of your CPAP. · If your nose is dry, try a humidifier.   · If your nose is runny or stuffy, try decongestant medicine or a steroid nasal spray. If these things do not help, you might try a different type of machine. Some machines have air pressure that adjusts on its own. Others have air pressures that are different when you breathe in than when you breathe out. This may reduce discomfort caused by too much pressure in your nose. Where can you learn more? Go to Gl[employer]  Enter Ashlee Jose in the search box to learn more about \"Learning About CPAP for Sleep Apnea. \"   © 9535-5519 Healthwise, Incorporated. Care instructions adapted under license by [employer] (which disclaims liability or warranty for this information). This care instruction is for use with your licensed healthcare professional. If you have questions about a medical condition or this instruction, always ask your healthcare professional. [address] any warranty or liability for your use of this information. Content Version: 3.4.21267; Last Revised: January 11, 2010  PROPER SLEEP HYGIENE    What to avoid  · Do not have drinks with caffeine, such as coffee or black tea, for 8 hours before bed. · Do not smoke or use other types of tobacco near bedtime. Nicotine is a stimulant and can keep you awake. · Avoid drinking alcohol late in the evening, because it can cause you to wake in the middle of the night. · Do not eat a big meal close to bedtime. If you are hungry, eat a light snack. · Do not drink a lot of water close to bedtime, because the need to urinate may wake you up during the night. · Do not read or watch TV in bed. Use the bed only for sleeping and sexual activity. What to try  · Go to bed at the same time every night, and wake up at the same time every morning. Do not take naps during the day. · Keep your bedroom quiet, dark, and cool. · Get regular exercise, but not within 3 to 4 hours of your bedtime. .  · Sleep on a comfortable pillow and mattress.   · If watching the clock makes you anxious, turn it facing away from you so you cannot see the time. · If you worry when you lie down, start a worry book. Well before bedtime, write down your worries, and then set the book and your concerns aside. · Try meditation or other relaxation techniques before you go to bed. · If you cannot fall asleep, get up and go to another room until you feel sleepy. Do something relaxing. Repeat your bedtime routine before you go to bed again. · Make your house quiet and calm about an hour before bedtime. Turn down the lights, turn off the TV, log off the computer, and turn down the volume on music. This can help you relax after a busy day. Drowsy Driving: The [employer] cites drowsiness as a causing factor in more than 253,431 police reported crashes annually, resulting in 76,000 injuries and 1,500 deaths. Other surveys suggest 55% of people polled have driven while drowsy in the past year, 23% had fallen asleep but not crashed, 3% crashed, and 2% had and accident due to drowsy driving. Who is at risk? Young Drivers: One study of drowsy driving accidents states that 55% of the drivers were under 25 years. Of those, 75% were male. Shift Workers and Travelers: People who work overnight or travel across time zones frequently are at higher risk of experiencing Circadian Rhythm Disorders. They are trying to work and function when their body is programed to sleep. Sleep Deprived: Lack of sleep has a serious impact on your ability to pay attention or focus on a task. Consistently getting less than the average of 8 hours your body needs creates partial or cumulative sleep deprivation. Untreated Sleep Disorders: Sleep Apnea, Narcolepsy, R.L.S., and other sleep disorders (untreated) prevent a person from getting enough restful sleep. This leads to excessive daytime sleepiness and increases the risk for drowsy driving accidents by up to 7 times.   Medications / Alcohol: Even over the counter medications can cause drowsiness. Medications that impair a drivers attention should have a warning label. Alcohol naturally makes you sleepy and on its own can cause accidents. Combined with excessive drowsiness its effects are amplified. Signs of Drowsy Driving:   * You don't remember driving the last few miles   * You may drift out of your mariella   * You are unable to focus and your thoughts wander   * You may yawn more often than normal   * You have difficulty keeping your eyes open / nodding off   * Missing traffic signs, speeding, or tailgating  Prevention-   Good sleep hygiene, lifestyle and behavioral choices have the most impact on drowsy driving. There is no substitute for sleep and the average person requires 8 hours nightly. If you find yourself driving drowsy, stop and sleep. Consider the sleep hygiene tips provided during your visit as well. Medication Refill Policy: Refills for all medications require 1 week advance notice. Please have your pharmacy fax a refill request. We are unable to fax, or call in \"controled substance\" medications and you will need to pick these prescriptions up from our office. [employer] Activation    Thank you for requesting access to [employer]. Please follow the instructions below to securely access and download your online medical record. [employer] allows you to send messages to your doctor, view your test results, renew your prescriptions, schedule appointments, and more. How Do I Sign Up? 1. In your internet browser, go to [email or web address]. 2. Click on the First Time User? Click Here link in the Sign In box. You will see the New Member Sign Up page. 3. Enter your [employer] Access Code exactly as it appears below. You will not need to use this code after youve completed the sign-up process. If you do not sign up before the expiration date, you must request a new code. [employer] Access Code:  Activation code not generated  Current M[employer]verse Status: Active (This is the date your [employer] access code will )    4. Enter the last four digits of your Social Security Number (xxxx) and Date of Birth (mm/dd/yyyy) as indicated and click Submit. You will be taken to the next sign-up page. 5. Create a [employer] ID. This will be your [employer] login ID and cannot be changed, so think of one that is secure and easy to remember. 6. Create a M[employer]verse password. You can change your password at any time. 7. Enter your Password Reset Question and Answer. This can be used at a later time if you forget your password. 8. Enter your e-mail address. You will receive e-mail notification when new information is available in [address] 9. Click Sign Up. You can now view and download portions of your medical record. 10. Click the Download Summary menu link to download a portable copy of your medical information. Additional Information    If you have questions, please call [phone]. Remember, [employer] is NOT to be used for urgent needs. For medical emergencies, dial 911.

## 2019-06-07 ENCOUNTER — OFFICE VISIT (OUTPATIENT)
Dept: PRIMARY CARE CLINIC | Age: 39
End: 2019-06-07

## 2019-06-07 VITALS
HEIGHT: 67 IN | RESPIRATION RATE: 16 BRPM | TEMPERATURE: 98.2 F | DIASTOLIC BLOOD PRESSURE: 79 MMHG | OXYGEN SATURATION: 98 % | SYSTOLIC BLOOD PRESSURE: 127 MMHG | BODY MASS INDEX: 49.44 KG/M2 | WEIGHT: 315 LBS | HEART RATE: 99 BPM

## 2019-06-07 DIAGNOSIS — E66.01 MORBIDLY OBESE (HCC): ICD-10-CM

## 2019-06-07 DIAGNOSIS — G47.30 INSOMNIA WITH SLEEP APNEA: ICD-10-CM

## 2019-06-07 DIAGNOSIS — G47.00 INSOMNIA WITH SLEEP APNEA: ICD-10-CM

## 2019-06-07 DIAGNOSIS — R51.9 CHRONIC NONINTRACTABLE HEADACHE, UNSPECIFIED HEADACHE TYPE: Primary | ICD-10-CM

## 2019-06-07 DIAGNOSIS — G89.29 CHRONIC NONINTRACTABLE HEADACHE, UNSPECIFIED HEADACHE TYPE: Primary | ICD-10-CM

## 2019-06-07 DIAGNOSIS — F32.89 OTHER DEPRESSION: ICD-10-CM

## 2019-06-07 RX ORDER — ZOLPIDEM TARTRATE 10 MG/1
10 TABLET ORAL
Qty: 30 TAB | Refills: 0 | Status: SHIPPED | OUTPATIENT
Start: 2019-06-07 | End: 2019-07-17 | Stop reason: SDUPTHER

## 2019-06-07 RX ORDER — PHENTERMINE HYDROCHLORIDE 37.5 MG/1
37.5 CAPSULE ORAL
COMMUNITY
End: 2019-06-07 | Stop reason: ALTCHOICE

## 2019-06-07 NOTE — PROGRESS NOTES
Written by Robi Wynn, as dictated by Dr. Darin Beltran MD.    Rhett Olivo is a 44 y.o. male. HPI  The patient presents today for weight management. He weighs 316 lbs today and has maintained his weight since 05/07/2019. Phentermine 37.5 mg was started on 02/08/2019 and last prescribed on 04/05/2019. Patient was taking 1 tab every other day, noting he only had cravings on days he did not take phentermine. He gets about 5,000-7,000 steps daily. He requests a refill of Ambien 10 mg, which he takes as needed for insomnia. Last prescription was given by Dr. Mihai Bal (sleep medicine) on 05/07/2019. He notes that he still has some Ambien at home. Patient is compliant on Wellbutrin  mg but believes he still needs more help. He is open to seeing a therapist and/or psychiatrist.    He reports that his migraines have improved while on phentermine. He is open to try Topamax for headaches. Patient Active Problem List   Diagnosis Code    Hyperlipemia E78.5    Acne vulgaris L70.0    Depression F32.9    Chronic nonintractable headache R51    Obesity, morbid ([facility] [address] E66.01    Attention and concentration deficit R41.840        Current Outpatient Medications on File Prior to Visit   Medication Sig Dispense Refill    buPROPion XL (WELLBUTRIN XL) 300 mg XL tablet Take 1 Tab by mouth every morning for 90 days. 90 Tab 0    cholecalciferol, vitamin D3, (VITAMIN D3) 2,000 unit tab Take  by mouth.  armodafinil 50 mg tab Take 50 mg by mouth daily. Max Daily Amount: 50 mg. (Patient not taking: Reported on 4/5/2019) 30 Tab 2     No current facility-administered medications on file prior to visit.         Allergies   Allergen Reactions    Sulfa (Sulfonamide Antibiotics) Nausea and Vomiting       Past Medical History:   Diagnosis Date    Mixed hyperlipidemia     Obesity     Persistent disorder of initiating or maintaining sleep     Sleep apnea        Past Surgical History:   Procedure Laterality Date    HX ORTHOPAEDIC  '05    rt. shoulder - \"slap\" injury       Family History   Problem Relation Age of Onset    Cancer Father         colon    Asthma Sister        Social History     Socioeconomic History    Marital status: SINGLE     Spouse name: Single    Number of children: 0    Years of education: Not on file    Highest education level: Not on file   Occupational History    Occupation: IT   Social Needs    Financial resource strain: Not on file    Food insecurity:     Worry: Not on file     Inability: Not on file    Transportation needs:     Medical: Not on file     Non-medical: Not on file   Tobacco Use    Smoking status: Never Smoker    Smokeless tobacco: Never Used   Substance and Sexual Activity    Alcohol use: Yes     Alcohol/week: 3.0 oz     Types: 3 Standard drinks or equivalent, 2 Cans of beer per week     Comment: 4x week    Drug use: No    Sexual activity: Yes     Partners: Female   Lifestyle    Physical activity:     Days per week: Not on file     Minutes per session: Not on file    Stress: Not on file   Relationships    Social connections:     Talks on phone: Not on file     Gets together: Not on file     Attends Baptism service: Not on file     Active member of club or organization: Not on file     Attends meetings of clubs or organizations: Not on file     Relationship status: Not on file    Intimate partner violence:     Fear of current or ex partner: Not on file     Emotionally abused: Not on file     Physically abused: Not on file     Forced sexual activity: Not on file   Other Topics Concern    Not on file   Social History Narrative    Not on file       Review of Systems   Constitutional: Negative for malaise/fatigue and weight loss. Respiratory: Negative for cough and shortness of breath. Musculoskeletal: Negative for joint pain and myalgias. Neurological: Positive for headaches.  Negative for dizziness, tingling, sensory change and weakness. Psychiatric/Behavioral: Positive for depression. Negative for memory loss and substance abuse. The patient has insomnia. Visit Vitals  /79 (BP 1 Location: Left arm, BP Patient Position: Sitting)   Pulse 99   Temp 98.2 °F (36.8 °C) (Oral)   Resp 16   Ht 5' 7\" (1.702 m)   Wt 316 lb 3.2 oz (143.4 kg)   SpO2 98%   BMI 49.52 kg/m²       Physical Exam   Constitutional: He is oriented to person, place, and time. He appears well-developed. No distress. Morbidly obese   HENT:   Right Ear: External ear normal.   Left Ear: External ear normal.   Eyes: Conjunctivae and EOM are normal. Right eye exhibits no discharge. Left eye exhibits no discharge. Neck: Normal range of motion. Neck supple. Cardiovascular: Normal rate and regular rhythm. Pulmonary/Chest: Effort normal and breath sounds normal. He has no wheezes. Abdominal: Soft. Bowel sounds are normal. There is no tenderness. Lymphadenopathy:     He has no cervical adenopathy. Neurological: He is alert and oriented to person, place, and time. Skin: He is not diaphoretic. Psychiatric: He has a normal mood and affect. His behavior is normal.   Nursing note and vitals reviewed. ASSESSMENT and PLAN    ICD-10-CM ICD-9-CM    1. Chronic nonintractable headache, unspecified headache type R51 784.0 topiramate ER (TROKENDI XR) 25 mg capsule sent to pharmacy. Trokendi XR 25 mg prescribed. Potential side effects were discussed. He should take 1 tab PO daily. If he does not have side effects after 5 days, he should increased to 2 tabs PO daily. 2. Other depression F32.89 311 REFERRAL TO PSYCHOLOGY    Referred to psychology. He should follow-up after he has testing. 3. Morbidly obese (HCC) E66.01 278.01 Commended on efforts. Encouraged diet and exercise. Trokendi should help with weight loss. 4. Insomnia with sleep apnea G47.00 780.51 zolpidem (AMBIEN) 10 mg tablet script given to patient. G47.30    Ambien 10 mg prescribed. Potential side effects were discussed. He should take 1 tab PO as needed, not oon regular basis. This plan was reviewed with the patient and patient agrees. All questions were answered. This scribe documentation was reviewed by me and accurately reflects the examination and decisions made by me. This note will not be viewable in [address]

## 2019-06-07 NOTE — PROGRESS NOTES
Chief Complaint   Patient presents with    Weight Management    Neck Pain     back of neck started today     Visit Vitals  /79 (BP 1 Location: Left arm, BP Patient Position: Sitting)   Pulse 99   Temp 98.2 °F (36.8 °C) (Oral)   Resp 16   Ht 5' 7\" (1.702 m)   Wt 316 lb 3.2 oz (143.4 kg)   SpO2 98%   BMI 49.52 kg/m²     1. Have you been to the ER, urgent care clinic since your last visit? Hospitalized since your last visit? No    2. Have you seen or consulted any other health care providers outside of the [address] [place] [address] since your last visit? Include any pap smears or colon screening.  No

## 2019-06-10 NOTE — PROGRESS NOTES
Last seen  05/9/19  Next appt 08/08/19  Last filled   03/28/19    Patient also wants a refill on Diflutin    Requested Prescriptions     Pending Prescriptions Disp Refills    amLODIPine (NORVASC) 10 mg tablet 30 Tab 0     Sig: Take 1 Tab by mouth daily. Written by Reny Cheney, as dictated by Dr. Taras Ang MD. 
[facility] Derek Valle is a 44 y.o. male. HPI The patient presents today for a weight loss follow-up. He weighs 313 lbs today and has lost weight from 317 lbs on 03/08/2019. Patient was prescribed phentermine 37.5 for the first time on 02/08/2019. He notes that 1 tab phentermine around 7-8 am keeps him up at night but otherwise denies side effects including palpitations. Phentermine has been working well to control his cravings. Patient has not tried taking 1/2 tab. He admits that he has not been active this week as he spends 14 hours sitting at a desk daily for work. He gets about 5,000 steps daily. Patient notes that it is difficult for him to get up in the morning. Compliant on Wellbutrin  mg and doing well. Patient Active Problem List  
Diagnosis Code  Hyperlipemia E78.5  Acne vulgaris L70.0  Depression F32.9  Chronic nonintractable headache R51  Obesity, morbid ([facility] [address] E66.01  
 Attention and concentration deficit R41.840 Current Outpatient Medications on File Prior to Visit Medication Sig Dispense Refill  phentermine (ADIPEX-P) 37.5 mg tablet Take 1 Tab by mouth every morning for 30 days. Max Daily Amount: 37.5 mg. 30 Tab 0  
 buPROPion XL (WELLBUTRIN XL) 300 mg XL tablet Take 1 Tab by mouth every morning for 90 days. 90 Tab 0  cholecalciferol, vitamin D3, (VITAMIN D3) 2,000 unit tab Take  by mouth.  armodafinil 50 mg tab Take 50 mg by mouth daily. Max Daily Amount: 50 mg. (Patient not taking: Reported on 4/5/2019) 30 Tab 2  
 armodafinil 50 mg tab TAKE 1 TABLET BY MOUTH EVERY DAY BEFORE BREAKFAST (Patient not taking: Reported on 4/5/2019) 30 Tab 0 No current facility-administered medications on file prior to visit. Allergies Allergen Reactions  Sulfa (Sulfonamide Antibiotics) Nausea and Vomiting Past Medical History:  
Diagnosis Date  Mixed hyperlipidemia  Obesity  Persistent disorder of initiating or maintaining sleep  Sleep apnea Past Surgical History:  
Procedure Laterality Date  HX ORTHOPAEDIC  '05  
 rt. shoulder - \"slap\" injury Family History Problem Relation Age of Onset  Cancer Father   
     colon  Asthma Sister Social History Socioeconomic History  Marital status: SINGLE Spouse name: Single  Number of children: 0  
 Years of education: Not on file  Highest education level: Not on file Occupational History  Occupation: IT Social Needs  Financial resource strain: Not on file  Food insecurity:  
  Worry: Not on file Inability: Not on file  Transportation needs:  
  Medical: Not on file Non-medical: Not on file Tobacco Use  Smoking status: Never Smoker  Smokeless tobacco: Never Used Substance and Sexual Activity  Alcohol use: Yes Alcohol/week: 3.0 oz Types: 3 Standard drinks or equivalent, 2 Cans of beer per week Comment: 4x week  Drug use: No  
 Sexual activity: Yes  
  Partners: Female Lifestyle  Physical activity:  
  Days per week: Not on file Minutes per session: Not on file  Stress: Not on file Relationships  Social connections:  
  Talks on phone: Not on file Gets together: Not on file Attends Christianity service: Not on file Active member of club or organization: Not on file Attends meetings of clubs or organizations: Not on file Relationship status: Not on file  Intimate partner violence:  
  Fear of current or ex partner: Not on file Emotionally abused: Not on file Physically abused: Not on file Forced sexual activity: Not on file Other Topics Concern  Not on file Social History Narrative  Not on file Review of Systems Constitutional: Positive for weight loss (intentional). Negative for malaise/fatigue. Respiratory: Negative for cough and shortness of breath. Cardiovascular: Negative for chest pain and palpitations. Musculoskeletal: Negative for joint pain and myalgias. Neurological: Negative for dizziness, tingling, sensory change, weakness and headaches. Psychiatric/Behavioral: Positive for depression (improved on wellbutrin). Negative for memory loss and substance abuse. Visit Vitals /81 (BP 1 Location: Left arm, BP Patient Position: Sitting) Pulse 95 Temp 98.2 °F (36.8 °C) (Oral) Resp 18 Ht 5' 7\" (1.702 m) Wt 313 lb 6.4 oz (142.2 kg) SpO2 99% BMI 49.09 kg/m² Physical Exam  
Constitutional: He is oriented to person, place, and time. He appears well-developed. No distress. Morbidly obese HENT:  
Right Ear: External ear normal.  
Left Ear: External ear normal.  
Eyes: Conjunctivae and EOM are normal. Right eye exhibits no discharge. Left eye exhibits no discharge. Neck: Normal range of motion. Neck supple. Cardiovascular: Normal rate and regular rhythm. Pulmonary/Chest: Effort normal and breath sounds normal. He has no wheezes. Abdominal: Soft. Bowel sounds are normal. There is no tenderness. Lymphadenopathy:  
  He has no cervical adenopathy. Neurological: He is alert and oriented to person, place, and time. Skin: He is not diaphoretic. Psychiatric: He has a normal mood and affect. His behavior is normal.  
Nursing note and vitals reviewed. ASSESSMENT and PLAN 
  ICD-10-CM ICD-9-CM 1. Other depression F32.89 311 Doing well on Wellbutrin  mg.  
2. Obesity, morbid ([facility]) E66.01 278.01 phentermine (ADIPEX-P) 37.5 mg tablet script given to patient. Phentermine 37.5 mg prescribed. Pt should take 1/2 tab daily. If 1/2 tab does not work well to control his cravings, he should take 1 full tab every other day. No history of glaucoma. Will discuss other potential medications at his 2 month follow-up.  Discussed that a healthy lifestyle requires 5,000-7,000 steps daily, but weight loss requires 10,000 steps daily. I would like him to start with a goal of 7,000 steps daily. Recommended walking in the evening and increased walking during the day. 3. Primary narcolepsy without cataplexy G47.419 347.00 Compliant on armodafinil 50 mg. This plan was reviewed with the patient and patient agrees. All questions were answered. This scribe documentation was reviewed by me and accurately reflects the examination and decisions made by me. This note will not be viewable in [address]

## 2019-06-13 ENCOUNTER — TELEPHONE (OUTPATIENT)
Dept: PRIMARY CARE CLINIC | Age: 39
End: 2019-06-13

## 2019-07-17 DIAGNOSIS — G47.30 INSOMNIA WITH SLEEP APNEA: ICD-10-CM

## 2019-07-17 DIAGNOSIS — G47.00 INSOMNIA WITH SLEEP APNEA: ICD-10-CM

## 2019-07-17 RX ORDER — ZOLPIDEM TARTRATE 10 MG/1
10 TABLET ORAL
Qty: 30 TAB | Refills: 0 | Status: SHIPPED | OUTPATIENT
Start: 2019-07-17 | End: 2021-01-05 | Stop reason: ALTCHOICE

## 2019-08-08 ENCOUNTER — OFFICE VISIT (OUTPATIENT)
Dept: PRIMARY CARE CLINIC | Age: 39
End: 2019-08-08

## 2019-08-08 VITALS
SYSTOLIC BLOOD PRESSURE: 128 MMHG | BODY MASS INDEX: 49.44 KG/M2 | WEIGHT: 315 LBS | DIASTOLIC BLOOD PRESSURE: 94 MMHG | HEIGHT: 67 IN | OXYGEN SATURATION: 96 % | HEART RATE: 90 BPM | RESPIRATION RATE: 20 BRPM

## 2019-08-08 DIAGNOSIS — F43.10 PTSD (POST-TRAUMATIC STRESS DISORDER): ICD-10-CM

## 2019-08-08 DIAGNOSIS — E66.01 MORBIDLY OBESE (HCC): ICD-10-CM

## 2019-08-08 DIAGNOSIS — F33.1 MODERATE EPISODE OF RECURRENT MAJOR DEPRESSIVE DISORDER (HCC): Primary | ICD-10-CM

## 2019-08-08 DIAGNOSIS — F41.9 ANXIETY: ICD-10-CM

## 2019-08-08 RX ORDER — BUPROPION HYDROCHLORIDE 100 MG/1
100 TABLET, EXTENDED RELEASE ORAL DAILY
Qty: 30 TAB | Refills: 0 | Status: SHIPPED | OUTPATIENT
Start: 2019-08-08 | End: 2019-08-16 | Stop reason: ALTCHOICE

## 2019-08-08 RX ORDER — SERTRALINE HYDROCHLORIDE 50 MG/1
50 TABLET, FILM COATED ORAL DAILY
Qty: 30 TAB | Refills: 0 | Status: SHIPPED | OUTPATIENT
Start: 2019-08-08 | End: 2019-09-07

## 2019-08-08 RX ORDER — BUPROPION HYDROCHLORIDE 300 MG/1
300 TABLET ORAL
COMMUNITY
End: 2019-08-08 | Stop reason: ALTCHOICE
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
## 2019-08-08 NOTE — PROGRESS NOTES
Visit Vitals  BP (!) 128/94 (BP 1 Location: Right arm, BP Patient Position: Sitting)   Pulse 90   Resp 20   Ht 5' 7\" (1.702 m)   Wt 331 lb 3.2 oz (150.2 kg)   SpO2 96%   BMI 51.87 kg/m²           Chief Complaint   Patient presents with    Follow-up     ADD/ADHD, evaluation completed            HM Due WNL/Reviewed. Patient is still reviewing Advance Care Planning materials. 1. Have you been to the ER, urgent care clinic since your last visit? Hospitalized since your last visit? Denies     2. Have you seen or consulted any other health care providers outside of the [address] [place] [address] since your last visit? Include any pap smears or colon screening.  Psychiatry

## 2019-08-08 NOTE — LETTER
NOTIFICATION RETURN TO WORK / SCHOOL 
 
8/8/2019 11:52 AM 
 
Mr. Bartolome Allison [address] [place] [phone] To Whom It May Concern: 
 
Bartolome Allison is currently under the care of Hernan Ding. He was seen today in the office with severe depression. He started on a medication and is referred to a psychiatrist for further evaluation. He will be seeing a psychologist as well. He will return to work/school on: 08/19/2019 If there are questions or concerns please have the patient contact our office. Sincerely, Rebecca Rhoades MD

## 2019-08-16 ENCOUNTER — OFFICE VISIT (OUTPATIENT)
Dept: PRIMARY CARE CLINIC | Age: 39
End: 2019-08-16

## 2019-08-16 VITALS
WEIGHT: 310.4 LBS | TEMPERATURE: 97.9 F | HEIGHT: 67 IN | RESPIRATION RATE: 18 BRPM | BODY MASS INDEX: 48.72 KG/M2 | DIASTOLIC BLOOD PRESSURE: 81 MMHG | HEART RATE: 89 BPM | OXYGEN SATURATION: 98 % | SYSTOLIC BLOOD PRESSURE: 135 MMHG

## 2019-08-16 DIAGNOSIS — E66.01 OBESITY, MORBID (HCC): ICD-10-CM

## 2019-08-16 DIAGNOSIS — F32.89 OTHER DEPRESSION: Primary | ICD-10-CM

## 2019-08-16 RX ORDER — SERTRALINE HYDROCHLORIDE 100 MG/1
100 TABLET, FILM COATED ORAL DAILY
Qty: 30 TAB | Refills: 2 | Status: SHIPPED | OUTPATIENT
Start: 2019-08-16 | End: 2019-11-14

## 2019-08-16 NOTE — PROGRESS NOTES
Written by Donal Joy, as dictated by Rita Willis MD.    History of Present Illness    Angie Elizondo is a 44 y.o. male who presents today for follow up of routine medical issues. Depression: Pt started taking sertraline 50mg after his last visit on 08/08/2019. At that time, he was markedly depressed with SI and other associated sx. Hershell Zeke He reports that depression, anxiety, and sleep disturbances are significantly improved with this medication. He denies any further side effect. However, pt notes that he is having anorgasmia since starting this medication. He denies erectile dysfunction or any associated pain. Pt is currently taking bupropion 100mg, but would like to stop taking this as he feels it is of little benefit. He continues to see Tosin Collins (psychology), and is trying to get an appointment with a psychiatrist.     Patient Active Problem List   Diagnosis Code    Hyperlipemia E78.5    Acne vulgaris L70.0    Depression F32.9    Chronic nonintractable headache R51    Obesity, morbid ([facility] [address] E66.01    Attention and concentration deficit R41.840        Current Outpatient Medications on File Prior to Visit   Medication Sig Dispense Refill    sertraline (ZOLOFT) 50 mg tablet Take 1 Tab by mouth daily for 30 days. 30 Tab 0    zolpidem (AMBIEN) 10 mg tablet Take 1 Tab by mouth nightly as needed for Sleep. Max Daily Amount: 10 mg. 30 Tab 0    cholecalciferol, vitamin D3, (VITAMIN D3) 2,000 unit tab Take  by mouth.  armodafinil 50 mg tab Take 50 mg by mouth daily. Max Daily Amount: 50 mg. (Patient not taking: Reported on 4/5/2019) 30 Tab 2     No current facility-administered medications on file prior to visit.         Allergies   Allergen Reactions    Sulfa (Sulfonamide Antibiotics) Nausea and Vomiting       Past Medical History:   Diagnosis Date    Mixed hyperlipidemia     Obesity     Persistent disorder of initiating or maintaining sleep     Sleep apnea        Past Surgical History: Procedure Laterality Date    HX ORTHOPAEDIC  '05    rt. shoulder - \"slap\" injury       Family History   Problem Relation Age of Onset    Cancer Father         colon    Asthma Sister        Social History     Socioeconomic History    Marital status: SINGLE     Spouse name: Single    Number of children: 0    Years of education: Not on file    Highest education level: Not on file   Occupational History    Occupation: IT   Social Needs    Financial resource strain: Not on file    Food insecurity:     Worry: Not on file     Inability: Not on file    Transportation needs:     Medical: Not on file     Non-medical: Not on file   Tobacco Use    Smoking status: Never Smoker    Smokeless tobacco: Never Used   Substance and Sexual Activity    Alcohol use: Yes     Alcohol/week: 5.0 standard drinks     Types: 3 Standard drinks or equivalent, 2 Cans of beer per week     Comment: 4x week    Drug use: No    Sexual activity: Yes     Partners: Female   Lifestyle    Physical activity:     Days per week: Not on file     Minutes per session: Not on file    Stress: Not on file   Relationships    Social connections:     Talks on phone: Not on file     Gets together: Not on file     Attends Latter-day service: Not on file     Active member of club or organization: Not on file     Attends meetings of clubs or organizations: Not on file     Relationship status: Not on file    Intimate partner violence:     Fear of current or ex partner: Not on file     Emotionally abused: Not on file     Physically abused: Not on file     Forced sexual activity: Not on file   Other Topics Concern    Not on file   Social History Narrative    Not on file       No visits with results within 3 Month(s) from this visit.    Latest known visit with results is:   Office Visit on 02/12/2019   Component Date Value Ref Range Status    Amphetamines, urine 04/29/2019 Negative  Vkdqnd=1269 ng/mL Final    Amphetamine test includes Amphetamine and Methamphetamine.  Barbiturates 04/29/2019 Negative  Jwzhct=942 ng/mL Final    Benzodiazepines 04/29/2019 Negative  Vsqpfc=276 ng/mL Final    Cannabinoids 04/29/2019 Negative  Cutoff=50 ng/mL Final    Cocaine 04/29/2019 Negative  Qmywzy=259 ng/mL Final    Opiates 04/29/2019 Negative  Ssdodm=879 ng/mL Final    Opiate test includes Codeine and Morphine only.  Phencyclidine 04/29/2019 Negative  Cutoff=25 ng/mL Final    Drug Screen Comment: 04/29/2019 Comment   Final    Comment: This analysis is performed by immunoassay. Positive findings are  unconfirmed analytical test results; if results do not support  expected clinical finding, confirmation by an alternate methodology is  recommended. Patient metabolic variables, specific drug chemistry, and  specimen characteristics can affect test outcome. Technical consultation is available at [email or web address], or call  toll free [phone]. Review of Systems   Constitutional: Negative for malaise/fatigue. HENT: Negative for congestion. Eyes: Negative for blurred vision and pain. Respiratory: Negative for cough and shortness of breath. Cardiovascular: Negative for chest pain and palpitations. Gastrointestinal: Negative for abdominal pain and heartburn. Genitourinary: Negative for frequency and urgency. Musculoskeletal: Negative for joint pain and myalgias. Neurological: Negative for dizziness, tingling, sensory change, weakness and headaches. Psychiatric/Behavioral: Positive for depression (improved). Negative for memory loss, substance abuse and suicidal ideas. The patient is nervous/anxious (improved). The patient does not have insomnia. Physical Exam   Constitutional: He is oriented to person, place, and time and well-developed, well-nourished, and in no distress. HENT:   Right Ear: External ear normal.   Left Ear: External ear normal.   Eyes: Conjunctivae and EOM are normal.   Neck: Normal range of motion. Neck supple. Cardiovascular: Normal rate and regular rhythm. Pulmonary/Chest: Effort normal and breath sounds normal.   Abdominal: Soft. Bowel sounds are normal.   Lymphadenopathy:     He has no cervical adenopathy. Neurological: He is alert and oriented to person, place, and time. Skin: Skin is warm and dry. Psychiatric: Mood and affect normal.       Visit Vitals  /81 (BP 1 Location: Right arm, BP Patient Position: Sitting)   Pulse 89   Temp 97.9 °F (36.6 °C) (Oral)   Resp 18   Ht 5' 7\" (1.702 m)   Wt 310 lb 6.4 oz (140.8 kg)   SpO2 98%   BMI 48.62 kg/m²       Diagnoses and all orders for this visit:    1. Other depression  -     sertraline (ZOLOFT) 100 mg tablet; Take 1 Tab by mouth daily for 90 days. Pt will decrease Wellbutrin to 100mg every other day, then DC this medication. Increased sertraline to 100mg every day as he is experiencing good benefit for depression. Advised him to return to office if side effects worsen. Provided list of psychiatric resources. Still waiting a call from Dr. Marylin Benito office. 2. Obesity, morbid ([facility] [address]  I have reviewed/discussed the above normal BMI with the patient. I have recommended the following interventions: dietary management education, guidance, and counseling, encourage exercise and monitor weight . This plan was reviewed with the patient and patient agrees. All questions were answered. This scribe documentation was reviewed by me and accurately reflects the examination and decisions made by me. This note will not be viewable in [address]

## 2019-08-16 NOTE — PROGRESS NOTES
Visit Vitals  /81 (BP 1 Location: Right arm, BP Patient Position: Sitting)   Pulse 89   Temp 97.9 °F (36.6 °C) (Oral)   Resp 18   Ht 5' 7\" (1.702 m)   Wt 310 lb 6.4 oz (140.8 kg)   SpO2 98%   BMI 48.62 kg/m²           Chief Complaint   Patient presents with    Follow-up     Depression    Medication Evaluation     Zoloft, patient states improvement in symptoms, concerned w/ SE's erectile dysfunction and lack of appetite              HM Due Reviewed and WNL.         ====Urbano Postling Invitation====    Patient was invited to [employer] on this date and given the information folder for review. Recommended appointment with Urbano Postling facilitator for ACP conversation regarding advance directives. [x] Yes  [] No  Referral sent to [facility] team member or Coordinator for follow-up    [] Yes  [x] No  Patient scheduled an appointment. Site of Referral: [facility]            1. Have you been to the ER, urgent care clinic since your last visit? Hospitalized since your last visit? Denies     2. Have you seen or consulted any other health care providers outside of the [address] [place] [address] since your last visit? Include any pap smears or colon screening.  Therapist [address] [place], [place]

## 2019-12-23 ENCOUNTER — TELEPHONE (OUTPATIENT)
Dept: SLEEP MEDICINE | Age: 39
End: 2019-12-23

## 2019-12-23 DIAGNOSIS — G47.10 HYPERSOMNIA WITH SLEEP APNEA: ICD-10-CM

## 2019-12-23 DIAGNOSIS — G47.30 HYPERSOMNIA WITH SLEEP APNEA: ICD-10-CM

## 2019-12-27 ENCOUNTER — TELEPHONE (OUTPATIENT)
Dept: SLEEP MEDICINE | Age: 39
End: 2019-12-27

## 2019-12-27 RX ORDER — ARMODAFINIL 50 MG/1
50 TABLET ORAL
Qty: 30 TAB | Refills: 5 | OUTPATIENT
Start: 2019-12-27 | End: 2020-07-10 | Stop reason: SDUPTHER

## 2019-12-27 NOTE — TELEPHONE ENCOUNTER
Orders Placed This Encounter    armodafinil 50 mg tab     Sig: Take 50 mg by mouth Daily (before breakfast). Max Daily Amount: 50 mg.      Dispense:  30 Tab     Refill:  5

## 2019-12-31 NOTE — TELEPHONE ENCOUNTER
Patient called to check status on Modafinil refill request.  Patient informed that this has been sent to [facility] on [address] and [address] on 12/27/19. Patient also reminded of upcoming appointment with Dr. Nasreen Lynch on 5/7/2020.

## 2020-01-10 ENCOUNTER — OFFICE VISIT (OUTPATIENT)
Dept: SLEEP MEDICINE | Age: 40
End: 2020-01-10

## 2020-01-10 VITALS
TEMPERATURE: 97.4 F | DIASTOLIC BLOOD PRESSURE: 83 MMHG | BODY MASS INDEX: 49.44 KG/M2 | HEIGHT: 67 IN | HEART RATE: 89 BPM | SYSTOLIC BLOOD PRESSURE: 134 MMHG | OXYGEN SATURATION: 96 % | WEIGHT: 315 LBS

## 2020-01-10 DIAGNOSIS — G47.10 HYPERSOMNIA WITH SLEEP APNEA: Primary | ICD-10-CM

## 2020-01-10 DIAGNOSIS — G47.30 HYPERSOMNIA WITH SLEEP APNEA: Primary | ICD-10-CM

## 2020-01-10 RX ORDER — VORTIOXETINE 20 MG/1
TABLET, FILM COATED ORAL
COMMUNITY
Start: 2019-12-23

## 2020-01-10 NOTE — PATIENT INSTRUCTIONS
[address] [facility].[address], [address]  Tel.  [phone]  Fax. 100 [facility] [address]  [place], [address] [facility]  Tel.  [phone]  Fax. [phone] [address]  Tel.  [phone]  Fax. [phone]     Learning About CPAP for Sleep Apnea  What is CPAP? CPAP is a small machine that you use at home every night while you sleep. It increases air pressure in your throat to keep your airway open. When you have sleep apnea, this can help you sleep better so you feel much better. CPAP stands for \"continuous positive airway pressure. \"  The CPAP machine will have one of the following:  · A mask that covers your nose and mouth  · Prongs that fit into your nose  · A mask that covers your nose only, the most common type. This type is called NCPAP. The N stands for \"nasal.\"  Why is it done? CPAP is usually the best treatment for obstructive sleep apnea. It is the first treatment choice and the most widely used. Your doctor may suggest CPAP if you have:  · Moderate to severe sleep apnea. · Sleep apnea and coronary artery disease (CAD) or heart failure. How does it help? · CPAP can help you have more normal sleep, so you feel less sleepy and more alert during the daytime. · CPAP may help keep heart failure or other heart problems from getting worse. · NCPAP may help lower your blood pressure. · If you use CPAP, your bed partner may also sleep better because you are not snoring or restless. What are the side effects? Some people who use CPAP have:  · A dry or stuffy nose and a sore throat. · Irritated skin on the face. · Sore eyes. · Bloating. If you have any of these problems, work with your doctor to fix them. Here are some things you can try:  · Be sure the mask or nasal prongs fit well. · See if your doctor can adjust the pressure of your CPAP. · If your nose is dry, try a humidifier.   · If your nose is runny or stuffy, try decongestant medicine or a steroid nasal spray. If these things do not help, you might try a different type of machine. Some machines have air pressure that adjusts on its own. Others have air pressures that are different when you breathe in than when you breathe out. This may reduce discomfort caused by too much pressure in your nose. Where can you learn more? Go to Amadix.be  Enter Bautista Galeas in the search box to learn more about \"Learning About CPAP for Sleep Apnea. \"   © 4253-7064 Healthwise, Incorporated. Care instructions adapted under license by [facility] [employer] (which disclaims liability or warranty for this information). This care instruction is for use with your licensed healthcare professional. If you have questions about a medical condition or this instruction, always ask your healthcare professional. [address] any warranty or liability for your use of this information. Content Version: 6.2.55342; Last Revised: January 11, 2010  PROPER SLEEP HYGIENE    What to avoid  · Do not have drinks with caffeine, such as coffee or black tea, for 8 hours before bed. · Do not smoke or use other types of tobacco near bedtime. Nicotine is a stimulant and can keep you awake. · Avoid drinking alcohol late in the evening, because it can cause you to wake in the middle of the night. · Do not eat a big meal close to bedtime. If you are hungry, eat a light snack. · Do not drink a lot of water close to bedtime, because the need to urinate may wake you up during the night. · Do not read or watch TV in bed. Use the bed only for sleeping and sexual activity. What to try  · Go to bed at the same time every night, and wake up at the same time every morning. Do not take naps during the day. · Keep your bedroom quiet, dark, and cool. · Get regular exercise, but not within 3 to 4 hours of your bedtime. .  · Sleep on a comfortable pillow and mattress.   · If watching the clock makes you anxious, turn it facing away from you so you cannot see the time. · If you worry when you lie down, start a worry book. Well before bedtime, write down your worries, and then set the book and your concerns aside. · Try meditation or other relaxation techniques before you go to bed. · If you cannot fall asleep, get up and go to another room until you feel sleepy. Do something relaxing. Repeat your bedtime routine before you go to bed again. · Make your house quiet and calm about an hour before bedtime. Turn down the lights, turn off the TV, log off the computer, and turn down the volume on music. This can help you relax after a busy day. Drowsy Driving: The [employer] cites drowsiness as a causing factor in more than 827,578 police reported crashes annually, resulting in 76,000 injuries and 1,500 deaths. Other surveys suggest 55% of people polled have driven while drowsy in the past year, 23% had fallen asleep but not crashed, 3% crashed, and 2% had and accident due to drowsy driving. Who is at risk? Young Drivers: One study of drowsy driving accidents states that 55% of the drivers were under 25 years. Of those, 75% were male. Shift Workers and Travelers: People who work overnight or travel across time zones frequently are at higher risk of experiencing Circadian Rhythm Disorders. They are trying to work and function when their body is programed to sleep. Sleep Deprived: Lack of sleep has a serious impact on your ability to pay attention or focus on a task. Consistently getting less than the average of 8 hours your body needs creates partial or cumulative sleep deprivation. Untreated Sleep Disorders: Sleep Apnea, Narcolepsy, R.L.S., and other sleep disorders (untreated) prevent a person from getting enough restful sleep. This leads to excessive daytime sleepiness and increases the risk for drowsy driving accidents by up to 7 times.   Medications / Alcohol: Even over the counter medications can cause drowsiness. Medications that impair a drivers attention should have a warning label. Alcohol naturally makes you sleepy and on its own can cause accidents. Combined with excessive drowsiness its effects are amplified. Signs of Drowsy Driving:   * You don't remember driving the last few miles   * You may drift out of your mariella   * You are unable to focus and your thoughts wander   * You may yawn more often than normal   * You have difficulty keeping your eyes open / nodding off   * Missing traffic signs, speeding, or tailgating  Prevention-   Good sleep hygiene, lifestyle and behavioral choices have the most impact on drowsy driving. There is no substitute for sleep and the average person requires 8 hours nightly. If you find yourself driving drowsy, stop and sleep. Consider the sleep hygiene tips provided during your visit as well. Medication Refill Policy: Refills for all medications require 1 week advance notice. Please have your pharmacy fax a refill request. We are unable to fax, or call in \"controled substance\" medications and you will need to pick these prescriptions up from our office. [employer] Activation    Thank you for requesting access to [employer]. Please follow the instructions below to securely access and download your online medical record. [employer] allows you to send messages to your doctor, view your test results, renew your prescriptions, schedule appointments, and more. How Do I Sign Up? 1. In your internet browser, go to [email or web address]. 2. Click on the First Time User? Click Here link in the Sign In box. You will see the New Member Sign Up page. 3. Enter your [employer] Access Code exactly as it appears below. You will not need to use this code after youve completed the sign-up process. If you do not sign up before the expiration date, you must request a new code. [employer] Access Code:  Activation code not generated  Current Spr[employer]ling Status: Active (This is the date your [employer] access code will )    4. Enter the last four digits of your Social Security Number (xxxx) and Date of Birth (mm/dd/yyyy) as indicated and click Submit. You will be taken to the next sign-up page. 5. Create a Rentifyt ID. This will be your Spr[employer]ling login ID and cannot be changed, so think of one that is secure and easy to remember. 6. Create a Spr[employer]ling password. You can change your password at any time. 7. Enter your Password Reset Question and Answer. This can be used at a later time if you forget your password. 8. Enter your e-mail address. You will receive e-mail notification when new information is available in [address] 9. Click Sign Up. You can now view and download portions of your medical record. 10. Click the Download Summary menu link to download a portable copy of your medical information. Additional Information    If you have questions, please call [phone]. Remember, [employer]ling is NOT to be used for urgent needs. For medical emergencies, dial 911.

## 2020-01-10 NOTE — PROGRESS NOTES
[address] [facility] [place]. [place], [address]  Tel.  [phone]  Fax. 100 [facility] 60  [place], [address] [facility]  Tel.  [phone]  Fax. [phone] [address]  Tel.  [phone]  Fax. [phone]     S>Shorty Harris is a 44 y.o. male seen for a positive airway pressure follow-up. He reports no problems using the device. He is 93% compliant over the past 30 days. The following problems are identified:    Drowsiness yes Problems exhaling no   Snoring no Forget to put on no   Mask Comfortable yes Can't fall asleep no   Dry Mouth yes Mask falls off no   Air Leaking no Frequent awakenings no       He admits that his sleep has improved on PAP therapy using nasal pillows mask and non-heated tubing. He has tried a full face mask but this gave him a rash on the sides of the nasal bridge. He reports of persistent sleepiness, sleeping almost the entire day after having slept all night during the Christmas Holiday. He has been on Adderall but due to difficulty falling asleep when taking this medication along with absence of a diagnosis of ADHD this medication was discontinued. Patient indicates that his prescription is on hold at [address] [place] [address] - [phone]. CO2 23   20 - 29 mmol/L       PSG(04-): Apnea/Hypopnea index of 3.0 which indicates no signifiant sleep apnea on PAP Therapy. MSLT(04-): No significant daytime sleepiness as evidenced by an average latency of 16 minutes, Stage REM was not noted. Allergies   Allergen Reactions    Sulfa (Sulfonamide Antibiotics) Nausea and Vomiting       He has a current medication list which includes the following prescription(s): trintellix, cholecalciferol (vitamin d3), armodafinil, and zolpidem. [place] Bajwa He  has a past medical history of Mixed hyperlipidemia, Obesity, Persistent disorder of initiating or maintaining sleep, and Sleep apnea.     [place] Sleepiness Score: 13   and Modified F.O.S.Q. Score Total / 2: 9   which reflect improved sleep quality over therapy time. O>    Visit Vitals  /83 (BP 1 Location: Left arm, BP Patient Position: Sitting)   Pulse 89   Temp 97.4 °F (36.3 °C)   Ht 5' 7\" (1.702 m)   Wt 328 lb 4.8 oz (148.9 kg)   SpO2 96%   BMI 51.42 kg/m²         General:   Not in acute distress   Eyes:  Anicteric sclerae, no obvious strabismus   Nose:  No obvious nasal septum deviation    Oropharynx:   Class 4 oropharyngeal outlet, thick tongue base, uvula not seen due to low-lying soft palate, narrow tonsilo-pharyngeal pilars   Tonsils:   tonsils are not visualized due to low-lying soft palate   Neck:   midline trachea   Chest/Lungs:  Equal lung expansion, clear on auscultation    CVS:  Normal rate, regular rhythm; no JVD   Skin:  Warm to touch; no obvious rashes   Neuro:  No focal deficits ; no obvious tremor    Psych:  Normal affect,  normal countenance;           A>    ICD-10-CM ICD-9-CM    1. Hypersomnia with sleep apnea G47.10 780.53 AMB SUPPLY ORDER    G47.30       AHI = 37.5 (2017). On Bi - Level :  15/09 cmH2O. Compliant:      yes    Therapeutic Response:  Positive    P>    Orders Placed This Encounter    AMB SUPPLY ORDER     Diagnosis: Sleep Apnea ICD-10 Code (G47.30); ICD-9 Code (780.57). CPAP mask - Perform mask fit and provide patient with DreamWear FF mask / headgear. Joce Levine MD, FAASM; NPI: [id number]  Electronically signed. 01/10/20     * Spoke with Barry Perales changed dose to Armodafinil 150 mg daily - medication approved by patient's insurance. * We have recommended a dedicated weight loss through appropriate diet and an exercise regiment as significant weight reduction has been shown to reduce severity of obstructive sleep apnea. *   Follow-up and Dispositions    · Return in about 6 months (around 7/10/2020), or if symptoms worsen or fail to improve.          * He was asked to contact our office for any problems regarding PAP therapy. * Counseling was provided regarding the importance of regular PAP use and on proper sleep hygiene and safe driving. * Re-enforced proper and regular cleaning for the device. Thank you for allowing us to participate in your patient's medical care. Office visit exceeded 25 minutes with counseling and direction of care taking up more than 50% of the allotted time. Celeste Mc MD, FAASM  Electronically signed.  01/10/20

## 2020-03-04 ENCOUNTER — DOCUMENTATION ONLY (OUTPATIENT)
Dept: SLEEP MEDICINE | Age: 40
End: 2020-03-04

## 2020-03-27 ENCOUNTER — VIRTUAL VISIT (OUTPATIENT)
Dept: PRIMARY CARE CLINIC | Age: 40
End: 2020-03-27

## 2020-03-27 DIAGNOSIS — G89.29 CHRONIC MIDLINE LOW BACK PAIN WITHOUT SCIATICA: Primary | ICD-10-CM

## 2020-03-27 DIAGNOSIS — F32.89 OTHER DEPRESSION: ICD-10-CM

## 2020-03-27 DIAGNOSIS — R05.9 COUGH: ICD-10-CM

## 2020-03-27 DIAGNOSIS — M54.50 CHRONIC MIDLINE LOW BACK PAIN WITHOUT SCIATICA: Primary | ICD-10-CM

## 2020-03-27 RX ORDER — NAPROXEN 500 MG/1
500 TABLET ORAL 2 TIMES DAILY WITH MEALS
Qty: 30 TAB | Refills: 0 | Status: SHIPPED | OUTPATIENT
Start: 2020-03-27 | End: 2020-04-11

## 2020-03-27 RX ORDER — CYCLOBENZAPRINE HCL 10 MG
10 TABLET ORAL
Qty: 15 TAB | Refills: 0 | Status: SHIPPED | OUTPATIENT
Start: 2020-03-27 | End: 2020-04-11

## 2020-03-27 RX ORDER — LIDOCAINE 50 MG/G
PATCH TOPICAL
Qty: 5 PATCH | Refills: 1 | Status: SHIPPED | OUTPATIENT
Start: 2020-03-27 | End: 2021-01-05 | Stop reason: ALTCHOICE

## 2020-03-27 NOTE — PROGRESS NOTES
Written by Unique Stacy, as dictated by Dr. Chyna Cabello MD.    Maryan Garcia is a 36 y.o. male. HPI    This virtual visit was conducted via [address] Pursuant to the emergency declaration under the [facility], [facility] authority and the [employer] Act, this Virtual  Visit was conducted with patient's consent to the visit and all associated charges to reduce the patient's risk of exposure to COVID-19 and provide continuity of care for an established patient. Services were provided through a video synchronous discussion virtually to substitute for in-person clinic visit. Due to this being a TeleHealth evaluation, many elements of the physical examination are unable to be assessed. The patient presents today c/o back pain. 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 He reports that every time he lifts the equipment, it takes a couple of days for him to recover due to subsequent low back pain. He states that standing after lifting exacerbates his pain and causes knee weakness. He has been taking Tylenol with no relief. He also notes intermittent cough, which he attributes to wearing a mask while exerting himself at work. He does not have the cough at home. Denies fever or other symptoms. He has been to see the psychiatrist and was prescribed Trintellix 20 mg, which has been working well.        Patient Active Problem List   Diagnosis Code    Hyperlipemia E78.5    Acne vulgaris L70.0    Depression F32.9    Chronic nonintractable headache R51    Obesity, morbid ([facility] [address] E66.01    Attention and concentration deficit R41.840        Current Outpatient Medications on File Prior to Visit   Medication Sig Dispense Refill    TRINTELLIX 20 mg tablet TK 1 T PO QD      armodafinil 50 mg tab Take 50 mg by mouth Daily (before breakfast). Max Daily Amount: 50 mg. 30 Tab 5    zolpidem (AMBIEN) 10 mg tablet Take 1 Tab by mouth nightly as needed for Sleep. Max Daily Amount: 10 mg. 30 Tab 0    cholecalciferol, vitamin D3, (VITAMIN D3) 2,000 unit tab Take  by mouth. No current facility-administered medications on file prior to visit. Allergies   Allergen Reactions    Sulfa (Sulfonamide Antibiotics) Nausea and Vomiting       Past Medical History:   Diagnosis Date    Mixed hyperlipidemia     Obesity     Persistent disorder of initiating or maintaining sleep     Sleep apnea        Past Surgical History:   Procedure Laterality Date    HX ORTHOPAEDIC  '05    rt. shoulder - \"slap\" injury       Family History   Problem Relation Age of Onset    Cancer Father         colon    Asthma Sister        Social History     Socioeconomic History    Marital status: SINGLE     Spouse name: Single    Number of children: 0    Years of education: Not on file    Highest education level: Not on file   Occupational History    Occupation: IT   Social Needs    Financial resource strain: Not on file    Food insecurity     Worry: Not on file     Inability: Not on file   [place] [employer] needs     Medical: Not on file     Non-medical: Not on file   Tobacco Use    Smoking status: Never Smoker    Smokeless tobacco: Never Used   Substance and Sexual Activity    Alcohol use:  Yes     Alcohol/week: 5.0 standard drinks     Types: 3 Standard drinks or equivalent, 2 Cans of beer per week     Comment: 4x week    Drug use: No    Sexual activity: Yes     Partners: Female   Lifestyle    Physical activity     Days per week: Not on file     Minutes per session: Not on file    Stress: Not on file   Relationships    Social connections     Talks on phone: Not on file     Gets together: Not on file     Attends Mandaeism service: Not on file     Active member of club or organization: Not on file     Attends meetings of clubs or organizations: Not on file     Relationship status: Not on file    Intimate partner violence     Fear of current or ex partner: Not on file     Emotionally abused: Not on file     Physically abused: Not on file     Forced sexual activity: Not on file   Other Topics Concern    Not on file   Social History Narrative    Not on file       No visits with results within 3 Month(s) from this visit. Latest known visit with results is:   Office Visit on 02/12/2019   Component Date Value Ref Range Status    Amphetamines, urine 04/29/2019 Negative  Qwkhsc=5861 ng/mL Final    Amphetamine test includes Amphetamine and Methamphetamine.  Barbiturates 04/29/2019 Negative  Huybzv=368 ng/mL Final    Benzodiazepines 04/29/2019 Negative  Spbmnl=806 ng/mL Final    Cannabinoids 04/29/2019 Negative  Cutoff=50 ng/mL Final    Cocaine 04/29/2019 Negative  Ilelfp=706 ng/mL Final    Opiates 04/29/2019 Negative  Qcfixx=964 ng/mL Final    Opiate test includes Codeine and Morphine only.  Phencyclidine 04/29/2019 Negative  Cutoff=25 ng/mL Final    Drug Screen Comment: 04/29/2019 Comment   Final    Comment: This analysis is performed by immunoassay. Positive findings are  unconfirmed analytical test results; if results do not support  expected clinical finding, confirmation by an alternate methodology is  recommended. Patient metabolic variables, specific drug chemistry, and  specimen characteristics can affect test outcome. Technical consultation is available at [employer] or call  toll free [phone]. Review of Systems   Constitutional: Negative for malaise/fatigue and weight loss. HENT: Negative for congestion. Respiratory: Positive for cough. Negative for shortness of breath. Cardiovascular: Negative for chest pain and leg swelling. Gastrointestinal: Negative for constipation, diarrhea and heartburn. Musculoskeletal: Positive for back pain. Negative for joint pain and myalgias. Neurological: Negative for dizziness and headaches. Psychiatric/Behavioral: Negative for depression. The patient is not nervous/anxious and does not have insomnia. There were no vitals taken for this visit. Physical Exam  Constitutional:       General: He is not in acute distress. Appearance: He is not diaphoretic. HENT:      Head: Normocephalic and atraumatic. Nose: No congestion. Eyes:      General:         Right eye: No discharge. Left eye: No discharge. Conjunctiva/sclera: Conjunctivae normal.      Pupils: Pupils are equal, round, and reactive to light. Pulmonary:      Effort: Pulmonary effort is normal. No respiratory distress. Neurological:      Mental Status: He is alert and oriented to person, place, and time. Gait: Gait normal.   Psychiatric:         Behavior: Behavior normal.         Thought Content: Thought content normal.       ASSESSMENT and PLAN    ICD-10-CM ICD-9-CM    1. Chronic midline low back pain without sciatica M54.5 724.2 naproxen (NAPROSYN) 500 mg tablet sent to pharmacy    Naproxen 500 mg prescribed. Potential side effects were discussed. Pt should take 1 tab BID with food x 5 days. He should also drink copious amounts of water. Advised him to take the medication at most for 7 days, and in the future, he may take it on an as needed basis. He should follow [facility] is [facility]. G89.29 338.29 cyclobenzaprine (FLEXERIL) 10 mg tablet sent to pharmacy    Flexeril 10 mg prescribed. Potential side effects were discussed. Pt should take 1 tab nightly. lidocaine (LIDODERM) 5 % sent to pharmacy    Lidocaine 5% prescribed. Potential side effects were discussed. Pt may use the patch constantly and should replace it every 12 hours. If his symptoms persist, I will refer him to physical therapy for further evaluation. 2. Cough R05 786.2 No medications are needed at this time.     3. Other depression F32.89 311 Compliant on Trintellix 20 mg. Followed by Psychiatry. Total Time: minutes: 18 minutes    This plan was reviewed with the patient and patient agrees. All questions were answered. This scribe documentation was reviewed by me and accurately reflects the examination and decisions made by me. This note will not be viewable in [address]

## 2020-05-14 NOTE — TELEPHONE ENCOUNTER
Patient scheduled for 5/7/19
Sleep test interpreted, office visit to be scheduled to review results and discuss treatment options.
Walk in

## 2020-07-10 ENCOUNTER — VIRTUAL VISIT (OUTPATIENT)
Dept: SLEEP MEDICINE | Age: 40
End: 2020-07-10

## 2020-07-10 DIAGNOSIS — Z86.59 H/O: DEPRESSION: ICD-10-CM

## 2020-07-10 DIAGNOSIS — G47.10 HYPERSOMNIA WITH SLEEP APNEA: Primary | ICD-10-CM

## 2020-07-10 DIAGNOSIS — G47.30 HYPERSOMNIA WITH SLEEP APNEA: Primary | ICD-10-CM

## 2020-07-10 RX ORDER — ARMODAFINIL 50 MG/1
50 TABLET ORAL
Qty: 30 TAB | Refills: 5 | Status: SHIPPED | OUTPATIENT
Start: 2020-07-10 | End: 2020-07-15 | Stop reason: DRUGHIGH

## 2020-07-10 NOTE — PROGRESS NOTES
[address] [facility] [place]. [place], [address]  Tel.  [phone]  Fax. [address] [facility]  [place], [address]  Tel.  [phone]  Fax. [phone] [address] [facility] [place]  Tel.  [phone]  Fax. [phone]       S>    Jose Amaro is a 36 y.o. male who was seen by synchronous (real-time) audio-video technology on 7/10/2020. Consent:  He and/or his healthcare decision maker is aware that this patient-initiated Telehealth encounter is a billable service, with coverage as determined by his insurance carrier. He is aware that he may receive a bill and has provided verbal consent to proceed: Yes    I was at home while conducting this encounter. Patient verified with 's License. He reports no problems using the device. He is 100% compliant over the past 30 days. The following problems are identified:    Drowsiness no Problems exhaling no   Snoring no Forget to put on no   Mask Comfortable yes Can't fall asleep no   Dry Mouth yes Mask falls off no   Air Leaking no Frequent awakenings no         He admits that his sleep has improved on PAP therapy using nasal pillows mask and non-heated tubing. Armodafinil works well for controlling daytime sleepiness. He continues to wait for [facility] to fit him with a FF mask. PSG(04-): Apnea/Hypopnea index of 3.0 which indicates no signifiant sleep apnea on PAP Therapy. MSLT(04-): No significant daytime sleepiness as evidenced by an average latency of 16 minutes, Stage REM was not noted. Allergies   Allergen Reactions    Sulfa (Sulfonamide Antibiotics) Nausea and Vomiting       He has a current medication list which includes the following prescription(s): lidocaine, trintellix, cholecalciferol (vitamin d3), and zolpidem. Lucy Larson He  has a past medical history of Mixed hyperlipidemia, Obesity, Persistent disorder of initiating or maintaining sleep, and Sleep apnea.     [place] Sleepiness Score: 2   and Modified F.O.S.Q. Score Total / 2: 20   which reflect improved sleep quality over therapy time. O>        Patient-Reported Vitals 7/10/2020   Patient-Reported Weight 330 lbs   Patient-Reported Height 5' 7\"       Physical Exam completed by visual and auditory observation of patient with verbal input from patient. General:   Alert, oriented, not in acute distress   Eyes:  Anicteric Sclerae; no obvious strabismus   Nose:  No obvious nasal septum deviation    Neck:   Midline trachea, no visible mass   Chest/Lungs:  Respiratory effort normal, no visualized signs of difficulty breathing or respiratory distress   CVS:  No JVD   Extremities:  No obvious rashes noted on face, neck, or hands   Neuro:  No facial asymmetry, no focal deficits; no obvious tremor    Psych:  Normal affect,  normal countenance         A>    ICD-10-CM ICD-9-CM    1. Hypersomnia with sleep apnea  G47.10 780.53 DISCONTINUED: armodafiniL 50 mg tab    G47.30     2. H/O: depression  Z86.59 V11.8    3. BMI 50.0-59.9, adult ([facility])  Z68.43 V85.43      AHI = 37.5 (2017).  On Bi - Level :  15/09 cmH2O. Compliant:      yes    Therapeutic Response:  Positive    P>    * Patient is using his PAP device regularly and benefiting form therapy,  continued use of the device at 15/09 cmH2O is advised. Orders Placed This Encounter    DISCONTD: armodafiniL 50 mg tab     Sig: Take 50 mg by mouth Daily (before breakfast). Max Daily Amount: 50 mg. Dispense:  30 Tab     Refill:  5     He was informed on this medications including side effects and adverse reactions profile. * He is aware of the relationship between LASHAUN and depression and his mood is stable (patient is currently on anti-depressant therapy). * We have recommended a dedicated weight loss through appropriate diet and an exercise regiment as significant weight reduction has been shown to reduce severity of obstructive sleep apnea.        *   Follow-up and Dispositions    · Return in about 6 months (around 1/10/2021), or if symptoms worsen or fail to improve. * He was asked to contact our office for any problems regarding PAP therapy. * Counseling was provided regarding the importance of regular PAP use and on proper sleep hygiene and safe driving. * Re-enforced proper and regular cleaning for the device. Thank you for allowing us to participate in your patient's medical care. Office visit exceeded 25 minutes with counseling and direction of care taking up more than 50% of the allotted time. Pursuant to the emergency declaration under the [address] [place] [address], [facility] waiver authority and the [employer] Act, this Virtual  Visit was conducted, with patient's consent, to reduce the patient's risk of exposure to COVID-19 and provide continuity of care for an established patient. Services were provided through a video synchronous discussion virtually to substitute for in-person clinic visit. Marilee Duverney, MD, FAASM  Electronically signed.  07/15/20

## 2020-07-10 NOTE — PATIENT INSTRUCTIONS
[address] [place]. [address] [facility], [address] Tel.  [phone] Fax. [address] [place], [address] Tel.  [phone] Fax. [phone] [address] Tel.  [phone] Fax. [phone] Learning About CPAP for Sleep Apnea What is CPAP? CPAP is a small machine that you use at home every night while you sleep. It increases air pressure in your throat to keep your airway open. When you have sleep apnea, this can help you sleep better so you feel much better. CPAP stands for \"continuous positive airway pressure. \" The CPAP machine will have one of the following: · A mask that covers your nose and mouth · Prongs that fit into your nose · A mask that covers your nose only, the most common type. This type is called NCPAP. The N stands for \"nasal.\" Why is it done? CPAP is usually the best treatment for obstructive sleep apnea. It is the first treatment choice and the most widely used. Your doctor may suggest CPAP if you have: · Moderate to severe sleep apnea. · Sleep apnea and coronary artery disease (CAD) or heart failure. How does it help? · CPAP can help you have more normal sleep, so you feel less sleepy and more alert during the daytime. · CPAP may help keep heart failure or other heart problems from getting worse. · NCPAP may help lower your blood pressure. · If you use CPAP, your bed partner may also sleep better because you are not snoring or restless. What are the side effects? Some people who use CPAP have: · A dry or stuffy nose and a sore throat. · Irritated skin on the face. · Sore eyes. · Bloating. If you have any of these problems, work with your doctor to fix them. Here are some things you can try: · Be sure the mask or nasal prongs fit well. · See if your doctor can adjust the pressure of your CPAP. · If your nose is dry, try a humidifier. · If your nose is runny or stuffy, try decongestant medicine or a steroid nasal spray. If these things do not help, you might try a different type of machine. Some machines have air pressure that adjusts on its own. Others have air pressures that are different when you breathe in than when you breathe out. This may reduce discomfort caused by too much pressure in your nose. Where can you learn more? Go to [employer] Enter O816 in the search box to learn more about \"Learning About CPAP for Sleep Apnea. \"  
© 4194-9092 Healthwise, Incorporated. Care instructions adapted under license by [employer] (which disclaims liability or warranty for this information). This care instruction is for use with your licensed healthcare professional. If you have questions about a medical condition or this instruction, always ask your healthcare professional. [address] any warranty or liability for your use of this information. Content Version: 0.2.01283; Last Revised: January 11, 2010 PROPER SLEEP HYGIENE What to avoid · Do not have drinks with caffeine, such as coffee or black tea, for 8 hours before bed. · Do not smoke or use other types of tobacco near bedtime. Nicotine is a stimulant and can keep you awake. · Avoid drinking alcohol late in the evening, because it can cause you to wake in the middle of the night. · Do not eat a big meal close to bedtime. If you are hungry, eat a light snack. · Do not drink a lot of water close to bedtime, because the need to urinate may wake you up during the night. · Do not read or watch TV in bed. Use the bed only for sleeping and sexual activity. What to try · Go to bed at the same time every night, and wake up at the same time every morning. Do not take naps during the day. · Keep your bedroom quiet, dark, and cool. · Get regular exercise, but not within 3 to 4 hours of your bedtime. José Miguel Mathur · Sleep on a comfortable pillow and mattress. · If watching the clock makes you anxious, turn it facing away from you so you cannot see the time. · If you worry when you lie down, start a worry book. Well before bedtime, write down your worries, and then set the book and your concerns aside. · Try meditation or other relaxation techniques before you go to bed. · If you cannot fall asleep, get up and go to another room until you feel sleepy. Do something relaxing. Repeat your bedtime routine before you go to bed again. · Make your house quiet and calm about an hour before bedtime. Turn down the lights, turn off the TV, log off the computer, and turn down the volume on music. This can help you relax after a busy day. Drowsy Driving: The [facility] cites drowsiness as a causing factor in more than 215,504 police reported crashes annually, resulting in 76,000 injuries and 1,500 deaths. Other surveys suggest 55% of people polled have driven while drowsy in the past year, 23% had fallen asleep but not crashed, 3% crashed, and 2% had and accident due to drowsy driving. Who is at risk? Young Drivers: One study of drowsy driving accidents states that 55% of the drivers were under 25 years. Of those, 75% were male. Shift Workers and Travelers: People who work overnight or travel across time zones frequently are at higher risk of experiencing Circadian Rhythm Disorders. They are trying to work and function when their body is programed to sleep. Sleep Deprived: Lack of sleep has a serious impact on your ability to pay attention or focus on a task. Consistently getting less than the average of 8 hours your body needs creates partial or cumulative sleep deprivation.   
Untreated Sleep Disorders: Sleep Apnea, Narcolepsy, R.L.S., and other sleep disorders (untreated) prevent a person from getting enough restful sleep. This leads to excessive daytime sleepiness and increases the risk for drowsy driving accidents by up to 7 times. Medications / Alcohol: Even over the counter medications can cause drowsiness. Medications that impair a drivers attention should have a warning label. Alcohol naturally makes you sleepy and on its own can cause accidents. Combined with excessive drowsiness its effects are amplified. Signs of Drowsy Driving: * You don't remember driving the last few miles * You may drift out of your mariella * You are unable to focus and your thoughts wander * You may yawn more often than normal 
 * You have difficulty keeping your eyes open / nodding off * Missing traffic signs, speeding, or tailgating Prevention-  
Good sleep hygiene, lifestyle and behavioral choices have the most impact on drowsy driving. There is no substitute for sleep and the average person requires 8 hours nightly. If you find yourself driving drowsy, stop and sleep. Consider the sleep hygiene tips provided during your visit as well. Medication Refill Policy: Refills for all medications require 1 week advance notice. Please have your pharmacy fax a refill request. We are unable to fax, or call in \"controled substance\" medications and you will need to pick these prescriptions up from our office. [employer] Activation Thank you for requesting access to [employer]. Please follow the instructions below to securely access and download your online medical record. [employer] allows you to send messages to your doctor, view your test results, renew your prescriptions, schedule appointments, and more. How Do I Sign Up? 1. In your internet browser, go to [email or web address]. 2. Click on the First Time User? Click Here link in the Sign In box. You will see the New Member Sign Up page. 3. Enter your [employer] Access Code exactly as it appears below.  You will not need to use this code after youve completed the sign-up process. If you do not sign up before the expiration date, you must request a new code. [employer] Access Code: Activation code not generated Current [employer] Status: Active (This is the date your [employer] access code will ) 4. Enter the last four digits of your Social Security Number (xxxx) and Date of Birth (mm/dd/yyyy) as indicated and click Submit. You will be taken to the next sign-up page. 5. Create a Social Pointt ID. This will be your [employer] login ID and cannot be changed, so think of one that is secure and easy to remember. 6. Create a [employer] password. You can change your password at any time. 7. Enter your Password Reset Question and Answer. This can be used at a later time if you forget your password. 8. Enter your e-mail address. You will receive e-mail notification when new information is available in [address]. 9. Click Sign Up. You can now view and download portions of your medical record. 10. Click the Download Summary menu link to download a portable copy of your medical information. Additional Information If you have questions, please call [phone]. Remember, [employer] is NOT to be used for urgent needs. For medical emergencies, dial 911.

## 2020-07-15 ENCOUNTER — TELEPHONE (OUTPATIENT)
Dept: SLEEP MEDICINE | Age: 40
End: 2020-07-15

## 2020-07-15 DIAGNOSIS — G47.30 HYPERSOMNIA WITH SLEEP APNEA: Primary | ICD-10-CM

## 2020-07-15 DIAGNOSIS — G47.10 HYPERSOMNIA WITH SLEEP APNEA: Primary | ICD-10-CM

## 2020-07-15 RX ORDER — ARMODAFINIL 150 MG/1
1 TABLET ORAL DAILY
Qty: 30 TAB | Refills: 5 | Status: SHIPPED | OUTPATIENT
Start: 2020-07-15 | End: 2021-02-12 | Stop reason: ALTCHOICE

## 2020-07-15 NOTE — TELEPHONE ENCOUNTER
Spoke with patient called pharmacy (spoke with Mari) and revised prescription for patient to take Armodafinil 150 mg ACB. Orders Placed This Encounter    Armodafinil 150 mg tab     Sig: Take 1 Tab by mouth daily. Max Daily Amount: 1 Tab.      Dispense:  30 Tab     Refill:  5

## 2021-01-05 ENCOUNTER — OFFICE VISIT (OUTPATIENT)
Dept: PRIMARY CARE CLINIC | Age: 41
End: 2021-01-05
Payer: COMMERCIAL

## 2021-01-05 VITALS
HEART RATE: 90 BPM | RESPIRATION RATE: 16 BRPM | BODY MASS INDEX: 49.44 KG/M2 | TEMPERATURE: 97.8 F | DIASTOLIC BLOOD PRESSURE: 96 MMHG | HEIGHT: 67 IN | OXYGEN SATURATION: 96 % | WEIGHT: 315 LBS | SYSTOLIC BLOOD PRESSURE: 146 MMHG

## 2021-01-05 DIAGNOSIS — R63.1 INCREASED THIRST: ICD-10-CM

## 2021-01-05 DIAGNOSIS — E78.2 MIXED HYPERLIPIDEMIA: ICD-10-CM

## 2021-01-05 DIAGNOSIS — G47.33 OSA TREATED WITH BIPAP: ICD-10-CM

## 2021-01-05 DIAGNOSIS — E66.01 OBESITY, MORBID (HCC): ICD-10-CM

## 2021-01-05 DIAGNOSIS — I10 ESSENTIAL HYPERTENSION: Primary | ICD-10-CM

## 2021-01-05 DIAGNOSIS — R73.02 IGT (IMPAIRED GLUCOSE TOLERANCE): ICD-10-CM

## 2021-01-05 DIAGNOSIS — R35.0 URINARY FREQUENCY: ICD-10-CM

## 2021-01-05 DIAGNOSIS — E55.9 VITAMIN D DEFICIENCY: ICD-10-CM

## 2021-01-05 DIAGNOSIS — F32.89 OTHER DEPRESSION: ICD-10-CM

## 2021-01-05 LAB
BILIRUB UR QL STRIP: NEGATIVE
GLUCOSE UR-MCNC: NORMAL MG/DL
KETONES P FAST UR STRIP-MCNC: NEGATIVE MG/DL
PH UR STRIP: 6.5 [PH] (ref 4.6–8)
PROT UR QL STRIP: NEGATIVE
SP GR UR STRIP: 1.01 (ref 1–1.03)
UA UROBILINOGEN AMB POC: NORMAL (ref 0.2–1)
URINALYSIS CLARITY POC: NORMAL
URINALYSIS COLOR POC: YELLOW
URINE BLOOD POC: NEGATIVE
URINE LEUKOCYTES POC: NEGATIVE
URINE NITRITES POC: NEGATIVE

## 2021-01-05 PROCEDURE — 99214 OFFICE O/P EST MOD 30 MIN: CPT | Performed by: INTERNAL MEDICINE

## 2021-01-05 PROCEDURE — 81003 URINALYSIS AUTO W/O SCOPE: CPT | Performed by: INTERNAL MEDICINE

## 2021-01-05 NOTE — PROGRESS NOTES
Written by Emerita Moreno, as dictated by Dr. Jerald Mayorga MD.    Radha Nicholson (: 1980) is a 36 y.o. male, established patient, here for evaluation of the following chief complaint(s):  Urinary Frequency (for the last 5 months and states that he noticed that it is more so when sitting. is wondering about diabetes.  )       SUBJECTIVE/OBJECTIVE:  HPI  Pt presents today to discuss urinary frequency x5 months. He has been drinking more water and thought this may be causing the urinary frequency, but when he decreased the water intake it did not get better. He has not been eating all of his meals recently because he feels full. He also has a sore on his L upper abdomen which has been taking a long time to heal and has been there since 10/2020. Pt's BP is elevated today in office at 156/98, 146/96 on manual repeat in L arm He does not check it at home but has been getting HA recently. He has been using his BIPAP machine at night and sleeps well without needing Ambien. His labs drawn on 18 showed elevated total cholesterol (220) and LDL (163) with depressed HDL (34). He has been trying to cut back on his carbohydrates and exercise more since then. His vitamin D was depressed at 8.2 on 11/15/18 and he has been taking a vitamin D supplement. He follows with psychiatry and is doing well on Trintellix. He continues on armodafinil 150 mg every day for sleepiness in the afternoons. Patient Active Problem List   Diagnosis Code    Hyperlipemia E78.5    Acne vulgaris L70.0    Depression F32.9    Obesity, morbid ([facility] [address] E66.01    Attention and concentration deficit R41.840    LASHAUN treated with BiPAP G47.33        Current Outpatient Medications on File Prior to Visit   Medication Sig Dispense Refill    Armodafinil 150 mg tab Take 1 Tab by mouth daily. Max Daily Amount: 1 Tab.  30 Tab 5    TRINTELLIX 20 mg tablet TK 1 T PO QD      cholecalciferol, vitamin D3, (VITAMIN D3) 2,000 unit tab Take  by mouth.  lidocaine (LIDODERM) 5 % Apply patch to the affected area for 12 hours a day and remove for 12 hours a day. 5 Patch 1    [DISCONTINUED] zolpidem (AMBIEN) 10 mg tablet Take 1 Tab by mouth nightly as needed for Sleep. Max Daily Amount: 10 mg. 30 Tab 0     No current facility-administered medications on file prior to visit. Allergies   Allergen Reactions    Sulfa (Sulfonamide Antibiotics) Nausea and Vomiting       Past Medical History:   Diagnosis Date    Mixed hyperlipidemia     Obesity     Persistent disorder of initiating or maintaining sleep     Sleep apnea        Past Surgical History:   Procedure Laterality Date    HX ORTHOPAEDIC  '05    rt. shoulder - \"slap\" injury       Family History   Problem Relation Age of Onset    Cancer Father         colon    Asthma Sister        Social History     Socioeconomic History    Marital status: SINGLE     Spouse name: Single    Number of children: 0    Years of education: Not on file    Highest education level: Not on file   Occupational History    Occupation: IT   Social Needs    Financial resource strain: Not on file    Food insecurity     Worry: Not on file     Inability: Not on file   [employer] needs     Medical: Not on file     Non-medical: Not on file   Tobacco Use    Smoking status: Never Smoker    Smokeless tobacco: Never Used   Substance and Sexual Activity    Alcohol use:  Yes     Alcohol/week: 5.0 standard drinks     Types: 3 Standard drinks or equivalent, 2 Cans of beer per week     Comment: 4x week    Drug use: No    Sexual activity: Yes     Partners: Female   Lifestyle    Physical activity     Days per week: Not on file     Minutes per session: Not on file    Stress: Not on file   Relationships    Social connections     Talks on phone: Not on file     Gets together: Not on file     Attends [employer] service: Not on file     Active member of club or organization: Not on file     Attends meetings of clubs or organizations: Not on file     Relationship status: Not on file    Intimate partner violence     Fear of current or ex partner: Not on file     Emotionally abused: Not on file     Physically abused: Not on file     Forced sexual activity: Not on file   Other Topics Concern    Not on file   Social History Narrative    Not on file       Office Visit on 01/05/2021   Component Date Value Ref Range Status    Color (UA POC) 01/05/2021 Yellow   Final    Clarity (UA POC) 01/05/2021 Slightly Cloudy   Final    Glucose (UA POC) 01/05/2021 4+  Negative Final    Bilirubin (UA POC) 01/05/2021 Negative  Negative Final    Ketones (UA POC) 01/05/2021 Negative  Negative Final    Specific gravity (UA POC) 01/05/2021 1.015  1.001 - 1.035 Final    Blood (UA POC) 01/05/2021 Negative  Negative Final    pH (UA POC) 01/05/2021 6.5  4.6 - 8.0 Final    Protein (UA POC) 01/05/2021 Negative  Negative Final    Urobilinogen (UA POC) 01/05/2021 0.2 mg/dL  0.2 - 1 Final    Nitrites (UA POC) 01/05/2021 Negative  Negative Final    Leukocyte esterase (UA POC) 01/05/2021 Negative  Negative Final     Review of Systems   Constitutional: Negative for activity change, fatigue and unexpected weight change. HENT: Negative for congestion, ear discharge, ear pain, hearing loss, rhinorrhea and sore throat. Eyes: Negative for pain, discharge and redness. Respiratory: Negative for cough, chest tightness and shortness of breath. Cardiovascular: Negative for leg swelling. Gastrointestinal: Negative for abdominal pain, constipation and diarrhea. Endocrine: Positive for polydipsia and polyuria. Genitourinary: Negative for dysuria, flank pain and urgency. Musculoskeletal: Negative for arthralgias, back pain and myalgias. Skin: Positive for wound (sore on abdomen). Negative for color change. Neurological: Positive for headaches. Negative for dizziness and light-headedness.    Psychiatric/Behavioral: Negative for dysphoric mood and sleep disturbance. The patient is not nervous/anxious. Visit Vitals  BP (!) 146/96 (BP 1 Location: Left arm, BP Patient Position: Sitting)   Pulse 90   Temp 97.8 °F (36.6 °C) (Oral)   Resp 16   Ht 5' 7\" (1.702 m)   Wt 330 lb (149.7 kg)   SpO2 96%   BMI 51.69 kg/m²     Physical Exam  Vitals signs and nursing note reviewed. Constitutional:       General: He is not in acute distress. Appearance: Normal appearance. He is well-developed. He is not diaphoretic. HENT:      Head: Normocephalic and atraumatic. Right Ear: External ear normal.      Left Ear: External ear normal.   Eyes:      General: No scleral icterus. Right eye: No discharge. Left eye: No discharge. Extraocular Movements: Extraocular movements intact. Conjunctiva/sclera: Conjunctivae normal.      Pupils: Pupils are equal, round, and reactive to light. Neck:      Musculoskeletal: Normal range of motion and neck supple. Cardiovascular:      Rate and Rhythm: Normal rate and regular rhythm. Heart sounds: Normal heart sounds. Pulmonary:      Effort: Pulmonary effort is normal.      Breath sounds: Normal breath sounds. No wheezing. Abdominal:      General: Bowel sounds are normal.      Palpations: Abdomen is soft. Tenderness: There is no abdominal tenderness. Musculoskeletal: Normal range of motion. Lymphadenopathy:      Cervical: No cervical adenopathy. Skin:     Findings: Wound (scabbed, healing sore L upper abdomen) present. Neurological:      Mental Status: He is alert and oriented to person, place, and time. Psychiatric:         Mood and Affect: Mood and affect normal.       ASSESSMENT/PLAN:  1. Essential hypertension  I instructed pt to check their BP every day for 1 week, checking at different times of the day. They should keep a log of their readings and send it to me through [address]     2. Urinary frequency  -     AMB POC URINALYSIS DIP STICK AUTO W/O MICRO  -     METABOLIC PANEL, COMPREHENSIVE; Future  -     CBC W/O DIFF; Future  His POC urinalysis showed 4+ glucose. I ordered fasting labs for further evaluation before starting him on medications. 3. Increased thirst  Sx of elevated BS. Pending results of his labs we will make a treatment plan. 4. Obesity, morbid ([address]  He has been trying to work on exercise and healthy eating. I encouraged him to work on weight loss by walking more and cutting back on his refined carbohydrate intake. 5. LASHAUN treated with BiPAP  Stable, continues on BiPAP qPM and has not been needing Ambien anymore. 6. Vitamin D deficiency  -     VITAMIN D, 25 HYDROXY; Future  Check vitamin D. It was low in 2018 and he has been taking a supplement. 7. Mixed hyperlipidemia  -     LIPID PANEL; Future  I ordered fasting labs which he will come in and have done tomorrow to follow up on his elevated total cholesterol and LDL from 2018. 8. Other depression  Stable, continues on Trintellix and follows with psychiatry. 9. IGT (impaired glucose tolerance)  -     HEMOGLOBIN A1C WITH EAG; Future  -     MICROALBUMIN, UR, RAND W/ MICROALB/CREAT RATIO; Future  His POC urinalysis showed 4+ glucose. I ordered labs to evaluate for diabetes which he will come in to have done fasting tomorrow AM. Based on his labs we will make a treatment plan. This plan was reviewed with the patient and patient agrees. All questions were answered. This scribe documentation was reviewed by me and accurately reflects the examination and decisions made by me. An electronic signature was used to authenticate this note.   -- Aureliano Burrows

## 2021-01-05 NOTE — PROGRESS NOTES
Chief Complaint   Patient presents with    Urinary Frequency     for the last 5 months and states that he noticed that it is more so when sitting. is wondering about diabetes.

## 2021-01-12 ENCOUNTER — TELEPHONE (OUTPATIENT)
Dept: PRIMARY CARE CLINIC | Age: 41
End: 2021-01-12

## 2021-01-12 ENCOUNTER — OFFICE VISIT (OUTPATIENT)
Dept: PRIMARY CARE CLINIC | Age: 41
End: 2021-01-12
Payer: COMMERCIAL

## 2021-01-12 VITALS
TEMPERATURE: 97.8 F | SYSTOLIC BLOOD PRESSURE: 142 MMHG | HEART RATE: 84 BPM | RESPIRATION RATE: 18 BRPM | WEIGHT: 315 LBS | OXYGEN SATURATION: 98 % | HEIGHT: 67 IN | BODY MASS INDEX: 49.44 KG/M2 | DIASTOLIC BLOOD PRESSURE: 90 MMHG

## 2021-01-12 DIAGNOSIS — E55.9 VITAMIN D DEFICIENCY: ICD-10-CM

## 2021-01-12 DIAGNOSIS — I10 ESSENTIAL HYPERTENSION: ICD-10-CM

## 2021-01-12 DIAGNOSIS — E11.9 DIABETES MELLITUS, NEW ONSET (HCC): Primary | ICD-10-CM

## 2021-01-12 DIAGNOSIS — E78.2 MIXED HYPERLIPIDEMIA: ICD-10-CM

## 2021-01-12 DIAGNOSIS — E66.01 OBESITY, MORBID (HCC): ICD-10-CM

## 2021-01-12 PROCEDURE — 99214 OFFICE O/P EST MOD 30 MIN: CPT | Performed by: INTERNAL MEDICINE

## 2021-01-12 RX ORDER — METFORMIN HYDROCHLORIDE 850 MG/1
850 TABLET ORAL 2 TIMES DAILY WITH MEALS
Qty: 60 TAB | Refills: 0 | Status: SHIPPED | OUTPATIENT
Start: 2021-01-12 | End: 2021-02-03

## 2021-01-12 RX ORDER — LOSARTAN POTASSIUM 25 MG/1
25 TABLET ORAL DAILY
Qty: 30 TAB | Refills: 0 | Status: SHIPPED | OUTPATIENT
Start: 2021-01-12 | End: 2021-02-03

## 2021-01-12 RX ORDER — ARIPIPRAZOLE 2 MG/1
TABLET ORAL
COMMUNITY
Start: 2020-05-18 | End: 2022-04-12 | Stop reason: ALTCHOICE

## 2021-01-12 RX ORDER — INSULIN PUMP SYRINGE, 3 ML
EACH MISCELLANEOUS
Qty: 1 KIT | Refills: 0 | Status: SHIPPED | OUTPATIENT
Start: 2021-01-12

## 2021-01-12 RX ORDER — IBUPROFEN 200 MG
CAPSULE ORAL
Qty: 100 STRIP | Refills: 1 | Status: SHIPPED | OUTPATIENT
Start: 2021-01-12 | End: 2021-03-30 | Stop reason: SDUPTHER

## 2021-01-12 NOTE — PROGRESS NOTES
Written by Katie Frye, as dictated by Dr. Nick Yen MD.    Jenise Paniagua (: 1980) is a 36 y.o. male, established patient, here for evaluation of the following chief complaint(s):  Follow-up (discuss lab results/ blood pressure montoring)     SUBJECTIVE/OBJECTIVE:  HPI  Pt presents today to discuss the results of his labs drawn on 21. His HbA1c returned at 11.1, and his fasting BS was 276. His lipid panel also showed elevated total cholesterol (282), triglyceride (231), and LDL (205.8). He notes that he is not currently watching his diet or exercising. Pt's BP is elevated today in office at 142/90. He has not taken BP medication before. Patient Active Problem List   Diagnosis Code    Hyperlipemia E78.5    Acne vulgaris L70.0    Depression F32.9    Obesity, morbid ([facility] [address] E66.01    Attention and concentration deficit R41.840    LASHAUN treated with BiPAP G47.33        Current Outpatient Medications on File Prior to Visit   Medication Sig Dispense Refill    ARIPiprazole (ABILIFY) 2 mg tablet       Armodafinil 150 mg tab Take 1 Tab by mouth daily. Max Daily Amount: 1 Tab. 30 Tab 5    TRINTELLIX 20 mg tablet TK 1 T PO QD      cholecalciferol, vitamin D3, (VITAMIN D3) 2,000 unit tab Take  by mouth. No current facility-administered medications on file prior to visit.         Allergies   Allergen Reactions    Sulfa (Sulfonamide Antibiotics) Nausea and Vomiting       Past Medical History:   Diagnosis Date    Mixed hyperlipidemia     Obesity     Persistent disorder of initiating or maintaining sleep     Sleep apnea        Past Surgical History:   Procedure Laterality Date    HX ORTHOPAEDIC  '05    rt. shoulder - \"slap\" injury       Family History   Problem Relation Age of Onset    Cancer Father         colon    Asthma Sister        Social History     Socioeconomic History    Marital status: SINGLE     Spouse name: Single    Number of children: 0    Years of education: Not on file    Highest education level: Not on file   Occupational History    Occupation: IT   Social Needs    Financial resource strain: Not on file    Food insecurity     Worry: Not on file     Inability: Not on file   [employer] needs     Medical: Not on file     Non-medical: Not on file   Tobacco Use    Smoking status: Never Smoker    Smokeless tobacco: Never Used   Substance and Sexual Activity    Alcohol use:  Yes     Alcohol/week: 5.0 standard drinks     Types: 3 Standard drinks or equivalent, 2 Cans of beer per week     Comment: 4x week    Drug use: No    Sexual activity: Yes     Partners: Female   Lifestyle    Physical activity     Days per week: Not on file     Minutes per session: Not on file    Stress: Not on file   Relationships    Social connections     Talks on phone: Not on file     Gets together: Not on file     Attends [employer] service: Not on file     Active member of club or organization: Not on file     Attends meetings of clubs or organizations: Not on file     Relationship status: Not on file    Intimate partner violence     Fear of current or ex partner: Not on file     Emotionally abused: Not on file     Physically abused: Not on file     Forced sexual activity: Not on file   Other Topics Concern    Not on file   Social History Narrative    Not on file       Orders Only on 01/06/2021   Component Date Value Ref Range Status    Sodium 01/06/2021 139  136 - 145 mmol/L Final    Potassium 01/06/2021 3.5  3.5 - 5.1 mmol/L Final    Chloride 01/06/2021 104  97 - 108 mmol/L Final    CO2 01/06/2021 27  21 - 32 mmol/L Final    Anion gap 01/06/2021 8  5 - 15 mmol/L Final    Glucose 01/06/2021 276* 65 - 100 mg/dL Final    BUN 01/06/2021 9  6 - 20 MG/DL Final    Creatinine 01/06/2021 1.04  0.70 - 1.30 MG/DL Final    BUN/Creatinine ratio 01/06/2021 9* 12 - 20   Final    GFR est AA 01/06/2021 >60  >60 ml/min/1.73m2 Final    GFR est non-AA 01/06/2021 >60  >60 ml/min/1.73m2 Final    Comment: Estimated GFR is calculated using the IDMS-traceable Modification of Diet in  Renal Disease (MDRD) Study equation, reported for both  Americans  (GFRAA) and non- Americans (GFRNA), and normalized to 1.73m2 body  surface area. The physician must decide which value applies to the patient.  Calcium 01/06/2021 9.2  8.5 - 10.1 MG/DL Final    Bilirubin, total 01/06/2021 1.0  0.2 - 1.0 MG/DL Final    ALT (SGPT) 01/06/2021 54  12 - 78 U/L Final    AST (SGOT) 01/06/2021 33  15 - 37 U/L Final    Alk. phosphatase 01/06/2021 138* 45 - 117 U/L Final    Protein, total 01/06/2021 7.5  6.4 - 8.2 g/dL Final    Albumin 01/06/2021 3.8  3.5 - 5.0 g/dL Final    Globulin 01/06/2021 3.7  2.0 - 4.0 g/dL Final    A-G Ratio 01/06/2021 1.0* 1.1 - 2.2   Final    WBC 01/06/2021 5.6  4.1 - 11.1 K/uL Final    RBC 01/06/2021 5.36  4.10 - 5.70 M/uL Final    HGB 01/06/2021 14.9  12.1 - 17.0 g/dL Final    HCT 01/06/2021 46.2  36.6 - 50.3 % Final    MCV 01/06/2021 86.2  80.0 - 99.0 FL Final    MCH 01/06/2021 27.8  26.0 - 34.0 PG Final    MCHC 01/06/2021 32.3  30.0 - 36.5 g/dL Final    RDW 01/06/2021 14.4  11.5 - 14.5 % Final    PLATELET 62/12/0447 291  150 - 400 K/uL Final    MPV 01/06/2021 11.0  8.9 - 12.9 FL Final    NRBC 01/06/2021 0.0  0  WBC Final    ABSOLUTE NRBC 01/06/2021 0.00  0.00 - 0.01 K/uL Final    LIPID PROFILE 01/06/2021        Final    Cholesterol, total 01/06/2021 282* <200 MG/DL Final    Triglyceride 01/06/2021 231* <150 MG/DL Final    Comment: Based on NCEP-ATP III:  Triglycerides <150 mg/dL  is considered normal, 150-199  mg/dL  borderline high,  200-499 mg/dL high and  greater than or equal to 500  mg/dL very high.  HDL Cholesterol 01/06/2021 30  MG/DL Final    Comment: Based on NCEP ATP III, HDL Cholesterol <40 mg/dL is considered low and >60  mg/dL is elevated.       LDL, calculated 01/06/2021 205.8* 0 - 100 MG/DL Final    Comment: Based on the NCEP-ATP: LDL-C concentrations are considered  optimal <100 mg/dL,  near optimal/above Normal 100-129 mg/dL Borderline High: 130-159, High: 160-189  mg/dL Very High: Greater than or equal to 190 mg/dL      VLDL, calculated 01/06/2021 46.2  MG/DL Final    CHOL/HDL Ratio 01/06/2021 9.4* 0.0 - 5.0   Final    Hemoglobin A1c 01/06/2021 11.1* 4.0 - 5.6 % Final    Comment: NEW METHOD PLEASE NOTE NEW REFERENCE RANGE  (NOTE)  HbA1C Interpretive Ranges  <5.7              Normal  5.7 - 6.4         Consider Prediabetes  >6.5              Consider Diabetes      Est. average glucose 01/06/2021 272  mg/dL Final    Microalbumin,urine random 01/06/2021 5.68  MG/DL Final    No reference range has been established.  Creatinine, urine 01/06/2021 184.00  mg/dL Final    No reference range has been established.  Microalbumin/Creat ratio (mg/g cre* 01/06/2021 31* 0 - 30 mg/g Final    Vitamin D 25-Hydroxy 01/06/2021 16.6* 30 - 100 ng/mL Final    Comment: (NOTE)  Deficiency               <20 ng/mL  Insufficiency          20-30 ng/mL  Sufficient             ng/mL  Possible toxicity       >100 ng/mL    The Method used is Siemens Advia Centaur currently standardized to a   Center of Disease Control and Prevention (CDC) certified reference   [address]. Samples containing fluorescein dye can produce falsely   elevated values when tested with the ADVIA Centaur Vitamin D Assay. It is recommended that results in the toxic range, >100 ng/mL, be   retested 72 hours post fluorescein exposure.      Office Visit on 01/05/2021   Component Date Value Ref Range Status    Color (UA POC) 01/05/2021 Yellow   Final    Clarity (UA POC) 01/05/2021 Slightly Cloudy   Final    Glucose (UA POC) 01/05/2021 4+  Negative Final    Bilirubin (UA POC) 01/05/2021 Negative  Negative Final    Ketones (UA POC) 01/05/2021 Negative  Negative Final    Specific gravity (UA POC) 01/05/2021 1.015  1.001 - 1.035 Final    Blood (UA POC) 01/05/2021 Negative Negative Final    pH (UA POC) 01/05/2021 6.5  4.6 - 8.0 Final    Protein (UA POC) 01/05/2021 Negative  Negative Final    Urobilinogen (UA POC) 01/05/2021 0.2 mg/dL  0.2 - 1 Final    Nitrites (UA POC) 01/05/2021 Negative  Negative Final    Leukocyte esterase (UA POC) 01/05/2021 Negative  Negative Final     Review of Systems   Constitutional: Negative for activity change, fatigue and unexpected weight change. HENT: Negative for congestion, ear discharge, ear pain, hearing loss, rhinorrhea and sore throat. Eyes: Negative for pain, discharge and redness. Respiratory: Negative for cough, chest tightness and shortness of breath. Cardiovascular: Negative for leg swelling. Gastrointestinal: Negative for abdominal pain, constipation and diarrhea. Endocrine: Negative for polyuria. Genitourinary: Negative for dysuria, flank pain and urgency. Musculoskeletal: Negative for arthralgias, back pain and myalgias. Skin: Negative for color change. Neurological: Negative for dizziness, light-headedness and headaches. Psychiatric/Behavioral: Negative for dysphoric mood and sleep disturbance. The patient is not nervous/anxious. Visit Vitals  BP (!) 142/90 (BP 1 Location: Left arm, BP Patient Position: Standing)   Pulse 84   Temp 97.8 °F (36.6 °C)   Resp 18   Ht 5' 7\" (1.702 m)   Wt 331 lb 3.2 oz (150.2 kg)   SpO2 98%   BMI 51.87 kg/m²        Physical Exam  Vitals signs and nursing note reviewed. Constitutional:       General: He is not in acute distress. Appearance: Normal appearance. He is well-developed. He is not diaphoretic. HENT:      Head: Normocephalic and atraumatic. Right Ear: External ear normal.      Left Ear: External ear normal.   Eyes:      General: No scleral icterus. Right eye: No discharge. Left eye: No discharge. Extraocular Movements: Extraocular movements intact.       Conjunctiva/sclera: Conjunctivae normal.      Pupils: Pupils are equal, round, and reactive to light. Neck:      Musculoskeletal: Normal range of motion and neck supple. Cardiovascular:      Rate and Rhythm: Normal rate and regular rhythm. Heart sounds: Normal heart sounds. Pulmonary:      Effort: Pulmonary effort is normal.      Breath sounds: Normal breath sounds. No wheezing. Abdominal:      General: Bowel sounds are normal.      Palpations: Abdomen is soft. Tenderness: There is no abdominal tenderness. Musculoskeletal: Normal range of motion. Feet:      Comments: Diabetic foot exam:   Left: Vibratory sensation normal    Sharp/dull discrimination normal    Filament test normal sensation with micro filament   Pulse DP: 2+ (normal)   Deformities: None  Right: Vibratory sensation normal   Sharp/dull discrimination normal   Filament test normal sensation with micro filament   Pulse DP: 2+ (normal)   Deformities: None  Lymphadenopathy:      Cervical: No cervical adenopathy. Neurological:      Mental Status: He is alert and oriented to person, place, and time. Psychiatric:         Mood and Affect: Mood and affect normal.         ASSESSMENT/PLAN:  1. Diabetes mellitus, new onset ([address]  -      DIABETES FOOT EXAM normal.  -     metFORMIN (GLUCOPHAGE) 850 mg tablet; Take 1 Tab by mouth two (2) times daily (with meals) for 30 days. , Normal, Disp-60 Tab, R-0 sent to pharmacy. Potential side effects were discussed. -     Blood-Glucose Meter monitoring kit; Check blood glucose 3 times a day, Normal, Disp-1 Kit, R-0 sent to pharmacy. -     glucose blood VI test strips (blood glucose test) strip; Check blood sugar 3 times a day, Normal, Disp-100 Strip, R-1 sent to pharmacy. -     losartan (COZAAR) 25 mg tablet; Take 1 Tab by mouth daily for 30 days. , Normal, Disp-30 Tab, R-0 sent to pharmacy. Potential side effects were discussed. I prescribed metformin and instructed him to start off taking it UID for 3 days. If he does not have sfx he can increase to BID after that.  I instructed him to start checking his BS every day at different times each day. If his BS are running above 200, he needs to return for re-evaluation soon. If his BS is running in the 100s, he should schedule a f/u appt 1 month from now.   -     REFERRAL TO DIABETIC EDUCATION  I instructed him to cut down on his bread intake, having wheat bread once or twice a week but no more than that. He also needs to stop eating pasta, rice, or any sweets. I referred him to diabetic education. 2. Essential hypertension  I started him on losartan every day for his BP as it was elevated today in office and also when he was here on 01/05/21.     3. Mixed hyperlipidemia  I would not like to start him on too many medications at a time, so I will continue monitoring his cholesterol as he works on diet and exercise changes. 4. Obesity, morbid ([address]  I instructed him to cut down on his bread intake, having wheat bread once or twice a week but no more than that. He also needs to stop eating pasta, rice, or any sweets. I referred him to diabetic education. 5. Vitamin D deficiency  I instructed him to take vitamin D3 1000 iu every day. His vitamin D was low at 16.6 on 01/06/21. This plan was reviewed with the patient and patient agrees. All questions were answered. This scribe documentation was reviewed by me and accurately reflects the examination and decisions made by me. An electronic signature was used to authenticate this note.   -- Augustine Stocke

## 2021-01-12 NOTE — TELEPHONE ENCOUNTER
Patient called and stated he was told by dr Irene Carl he had diabetes. Was unsure what type. I spoke to dr bianchi and he stated type 2. I gave the patient the information.

## 2021-01-12 NOTE — PROGRESS NOTES
Chief Complaint   Patient presents with    Follow-up     discuss lab results/ blood pressure montoring

## 2021-01-19 ENCOUNTER — TELEPHONE (OUTPATIENT)
Dept: PRIMARY CARE CLINIC | Age: 41
End: 2021-01-19

## 2021-01-19 DIAGNOSIS — E11.9 CONTROLLED TYPE 2 DIABETES MELLITUS WITHOUT COMPLICATION, WITHOUT LONG-TERM CURRENT USE OF INSULIN (HCC): Primary | ICD-10-CM

## 2021-01-19 NOTE — TELEPHONE ENCOUNTER
Patient takes Metformin and Losartan and believes one or both is causing blurred vision. Patient would like a call back in regards to this asap.

## 2021-01-20 ENCOUNTER — VIRTUAL VISIT (OUTPATIENT)
Dept: DIABETES SERVICES | Age: 41
End: 2021-01-20
Payer: COMMERCIAL

## 2021-01-20 ENCOUNTER — TELEPHONE (OUTPATIENT)
Dept: PRIMARY CARE CLINIC | Age: 41
End: 2021-01-20

## 2021-01-20 DIAGNOSIS — E11.9 TYPE 2 DIABETES MELLITUS WITHOUT COMPLICATION, WITHOUT LONG-TERM CURRENT USE OF INSULIN (HCC): Primary | ICD-10-CM

## 2021-01-20 PROCEDURE — G0109 DIAB MANAGE TRN IND/GROUP: HCPCS

## 2021-01-20 NOTE — TELEPHONE ENCOUNTER
Called pharmacy and they stated they did not have the test strips or monitor, So it was called in on the phone now.   Called and informed the patient at this time

## 2021-01-20 NOTE — PROGRESS NOTES
[facility] Diabetes [facility]  Diabetes Self-Management Education   Pre-program Assessment    Reason for Referral: DSMES  Referral Source: Ny Ren MD    Metric Patient responses (1/20/2021)   A1c  (Goal: 7%)   Recent value:   Lab Results   Component Value Date/Time    Hemoglobin A1c 11.1 (H) 01/06/2021 09:15 AM        Healthy Eating     24-hour Dietary Recall:  Breakfast: skips often, at home - 2 eggs scrambled, diet tea  Lunch: frequently fast food, burgers and fries  Dinner: frequently fast food, burgers and fries  Snacks: rarely  Beverages: diet ginger ale, diet tea, water  Alcohol: 1-2 lite beers/week    Stage of change: Preparation     Being Active     Physical Activity Vital Sign:  How many days during the past week have you performed physical activity where your heart beats faster and your breathing is harder than normal for 30 minutes or more?  0 days    How many days in a typical week do you perform activity such as this?  0 days    Stage of change: Contemplation     Monitoring Do you monitor your blood sugar? Pt is picking up meter and supplies today 1/20/2021, will begin checking today    How often do you monitor? starting today    What are the range of readings? n/a-n/a mg/dL  Breakfast: n/a mg/dL  Lunch: n/a mg/dL  Dinner: n/a mg/dL  Bedtime: n/a mg/dL    Do you know your last A1c measurement? Yes    Do you know the meaning of the A1c? No    Stage of change: Preparation     Taking Medications Medication Management:  Do you understand what your diabetes medications do? No    Can you afford your diabetes medications? Yes    How often do you miss doses of your diabetes medications? Never    Current dosing:   Key Antihyperglycemic Medications             metFORMIN (GLUCOPHAGE) 850 mg tablet Take 1 Tab by mouth two (2) times daily (with meals) for 30 days.           Blood Pressure Management:  Key ACE/ARB Medications             losartan (COZAAR) 25 mg tablet Take 1 Tab by mouth daily for 30 days.          Lipid Management:  Key Antihyperlipidemia Meds     The patient is on no antihyperlipidemia meds. Clot Prevention:  Key Anti-Platelet Anticoagulant Meds     The patient is on no antiplatelet meds or anticoagulants. Stage of change: Preparation     Healthy Coping Diabetes Skills, Confidence and Preparedness Index: Total score: 4.4  Skills: 2.4  Confidence: 5.0  Preparedness: 6.5    Stage of change: Preparation     Reducing Risks Vaccines:  Influenza:   Immunization History   Administered Date(s) Administered    Influenza Vaccine [employer]) PF (>6 Mo Flulaval, Fluarix, and >3 Yrs Afluria, Fluzone 30020) 11/14/2018       Pneumococcal: There is no immunization history for the selected administration types on file for this patient. Hepatitis: There is no immunization history for the selected administration types on file for this patient. Examinations:  Diabetic Foot and Eye Exam HM Status   Topic Date Due    Eye Exam  01/25/1990    Diabetic Foot Care  01/12/2022        Dental exam: pt states he does not see a dentist and does not plan to    Foot exam: Last appointment was: 1/6/2021    Heart Protection:  BP Readings from Last 2 Encounters:   01/12/21 (!) 142/90   01/05/21 (!) 146/96        Lab Results   Component Value Date/Time    LDL, calculated 205.8 (H) 01/06/2021 09:15 AM        Kidney Protection:  Lab Results   Component Value Date/Time    Microalbumin/Creat ratio (mg/g creat) 31 (H) 01/06/2021 09:15 AM    Microalbumin,urine random 5.68 01/06/2021 09:15 AM       Patient-reported diabetes complications:  Pt reports blurry vision, frequent urination    Stage of change: Preparation     Problem Solving Hypoglycemia Management:  What are signs and symptoms of hypoglycemia that you experience? Sweat/clammy skin    How do you prevent hypoglycemia? Pt reported being unaware of how to prevent hypoglycemia    How do you treat hypoglycemia?  Pt reported being unaware of how to treat hypoglycemia    Hyperglycemia Management:  What are signs and symptoms of hyperglycemia that you experience? Pt reported being unaware of s/s of hyperglycemia    How can you prevent hyperglycemia? Pt reported being unaware of how to prevent hyperglycemia    Sick Day Management:  What do you do differently on sick days? Pt reported being unaware of self-management on sick days    Pattern Management:  Do you notice blood glucose patterns when you look at the readings in your meter or logbook? Pt to  meter from pharm 1/20/2021    How do you use the blood glucose readings from your meter or logbook? Pt reported being unaware of pattern management skills    Stage of change: Preparation   Note: Content derived from the American Association of Diabetes Educators' Diabetes Education Curriculum: A Guide to Successful Self-Management (2nd edition)         Diabetes Educator Recommendations:   - Address diabetes self-care behaviors through education and support using AADE7 Curriculum. Pt to attend weekly individual sessions on reducing risks, healthy eating, being active, monitoring, taking medications, healthy coping and problem solving.     - Patient intends to focus on these diabetes self-care practices: Reducing risks, Healthy eating, Monitoring, Physical activity, Taking medications, Healthy coping and Problem solving     - Detailed Diabetes Educator note/recommendation:  Mr. Rebeca Chew is newly diagnosed with T2DM, he has opted to participate in the [address] [place] [address] group sessions for DSMES (Diabetes Self-Management Education and Skills). He reports DM symptoms of new onset blurry vision, persistent fatigue, and frequent urination. He wonders if the recently prescribed Metformin or Losartan are causing the blurry vision, he does have an appointment with an eye doctor. He notices that when he skips meals he occasionally breaks out into a cold sweat, but does not note any symptoms that would indicate hyperglycemia.   Mr. Rebeca Chew has started to evaluate his diet. He has cut out regular soda and orange juice, and is trying to make \"better\" choices when eating out, like choosing grilled chicken on a salad versus a cheeseburger and fries - he has fast food or other restaurant food at about \"90%\" of his meals. He does not regularly engage in physical activity and is hesitant to say he can include physical activity in his weekly schedule. He states his biggest barrier will be maintaining better nutrition and including physical activity in his daily routine. Mr. Pipe Acevedo will begin checking his blood sugars on 1/21/2021. I have asked him to log his fasting, before lunch, before dinner, and dinner meal choices. He has expressed understanding.       - Pt to follow up with provider on the following: n/a at this time. Diabetes Self-Management Education Follow-up Visit: 2/4/2021 - Group Class    TGNUS-[facility] Public Health Emergency Adaptations for Telehealth:  Pollo Jean-Baptiste is a 36 y.o. male being evaluated through a synchronous (real-time) audio only technology platform, as a substitution for an in-person encounter, to address the healthcare issues mentioned above. I was in the office. The patient was at home. A caregiver was present when appropriate. The patient and/or his healthcare decision maker, is aware that this patient-initiated, Telehealth encounter on 1/20/2021 is a billable service, with coverage as determined by his insurance carrier. He is aware that he may receive a bill and has provided verbal consent to proceed: Yes. This telehealth encounter occurred during the COVID-19 pandemic and public health emergency. Evaluation of the following organ systems was limited: Vitals/Constitutional/EENT/Resp/CV/GI//MS/Neuro/Skin/Heme-Lymph-Imm.   Pursuant to the emergency declaration under the [facility], [address] [place] [address] authority and the [facility] and [employer], this Virtual Visit was conducted with patient's (and/or legal guardian's) consent, to reduce the risk of exposure to COVID-19 and provide necessary medical care. --Lizz Elliott RN on 1/20/2021 at 2:30 PM    An electronic signature was used to authenticate this note.  I was in the office for the appointment and time spent: 30 minutes

## 2021-01-20 NOTE — TELEPHONE ENCOUNTER
Patient is frustrated that his pharmacy says they do not have his blood glucose monitoring kit and test strips.    The order was sent to his pharmacy on file 1/12/2021 and we got confirmation for the kit and test strips but they keep telling him they dont have it and that we need to call them.    Please call [facility] pharmacy on file:    [id number] [facility]  P: [phone]

## 2021-02-03 DIAGNOSIS — E11.9 DIABETES MELLITUS, NEW ONSET (HCC): ICD-10-CM

## 2021-02-03 RX ORDER — METFORMIN HYDROCHLORIDE 850 MG/1
TABLET ORAL
Qty: 60 TAB | Refills: 0 | Status: SHIPPED | OUTPATIENT
Start: 2021-02-03 | End: 2021-03-01 | Stop reason: SDUPTHER

## 2021-02-03 RX ORDER — LOSARTAN POTASSIUM 25 MG/1
TABLET ORAL
Qty: 30 TAB | Refills: 0 | Status: SHIPPED | OUTPATIENT
Start: 2021-02-03 | End: 2021-02-12 | Stop reason: DRUGHIGH

## 2021-02-04 ENCOUNTER — CLINICAL SUPPORT (OUTPATIENT)
Dept: DIABETES SERVICES | Age: 41
End: 2021-02-04
Payer: COMMERCIAL

## 2021-02-04 DIAGNOSIS — E11.9 TYPE 2 DIABETES MELLITUS WITHOUT COMPLICATION, WITHOUT LONG-TERM CURRENT USE OF INSULIN (HCC): Primary | ICD-10-CM

## 2021-02-04 PROCEDURE — G0109 DIAB MANAGE TRN IND/GROUP: HCPCS

## 2021-02-04 NOTE — PROGRESS NOTES
[employer] [facility]  Diabetes Self-Management Education & Support Program  Encounter note    SUMMARY  Diabetes self-care management training was completed related to Reducing risks. The participant will return on 2/11/2021 to continue DSMES related to Healthy eating. The participant did identify SMART Goal(s): - Increase physical activity by walking for 10 minutes 3 times over the next week. , and will practice knowledge and skills related to reducing risks and being active to improve diabetes self-management. EVALUATION:  Today, in the Group Class we covered What Is Diabetes and How Do I Stay Healthy. Since the [facility] preassessment on 1/20/2021, Mr. Jeanette Denny has obtained a BG meter (One Touch Ultra) and has been checking his BGLs 3x daily; fasting ranges from , before lunch ranges from ; and 1 hour post-dinner . He will begin logging meals today. At the beginning of class, Mr. Jeanette Denny had a basic understanding of diabetes and stated he has T2DM. At the end of class, he expressed understanding of pathophysiology of DM, the long-term risks and complications of DM and a desire to bring his BGLs into target-range using medication and lifestyle changes. Mr. Jeanette Denny had previously reported symptoms of polydipsia, polyphagia, fatigue, and blurry vision; today he states all symptoms have resolved. He has had an eye exam within the week, eyes are in good health. He checks his feet daily as he has a history of wounds and bruises that take a long time to heal, his skin is currently intact. His past medical history includes LASHAUN, he has been using bipap since 2018 with success. As of today, he is aware of the fasting and postprandial BGL targets and can verbalize when he should be checking his BGLs. Mr. Anita Ramirez main interest is nutrition, which we will begin to cover next week.  He has adopted a meal plan that he read about on the website [employer], based on what he reports eating at each meal, he is likely not meeting his carbohydrate needs, however he says he does not feel restricted. When asked which foods are sources of carbohydrates, Mr. Shiela Ferraro states \"potatoes, bread, and spaghetti. \"  He has lost 9 lb since his diagnosis. He is not engaging in physical activity at this time, but his SMART Goal is to begin by walking for 10 minutes, 3 times over the next week. We will work on setting small attainable goals to increase his physical activity over time. RECOMMENDATIONS:  Mr. Shiela Ferraro should begin including physical activity in his daily routine in small increments of time. He will also begin logging his meals and the accompanying BGLs. Next provider visit is scheduled for 2/11/2021       SMART GOAL(S)  1. Walk for 10 minutes 3 times over the next 7 days. ACHIEVEMENT OF GOAL(S)  [] 0-24%     [] 25-49%     [] 50-74%     [] %  2. ACHIEVEMENT OF GOAL(S)  [] 0-24%     [] 25-49%     [] 50-74%     [] %  3. ACHIEVEMENT OF GOAL(S)  [] 0-24%     [] 25-49%     [] 50-74%     [] %       DATE DSMES TOPIC EVALUATION     2/4/2021 WHAT IS DIABETES?   a. Role of the normal pancreas in energy balance and blood glucose control   b. The defect seen in diabetes   c. Signs & symptoms of diabetes   d. Diagnosis of diabetes   e. Types of diabetes   f. Blood glucose targets in non-pregnant & non-pregnant adults       The participant knows   Their type of diabetes Yes   The basic physiologic defect Yes   Blood glucose targets No     DATE DSMES TOPIC EVALUATION     2/4/2021 HOW DO I STAY HEALTHY?   a. Prevention    Vaccinations    Preconception care (if applicable)  b.  Examinations    Eye     Dental   Foot   c. Diabetic complications' prevention    Heart health    Kidney Health    Nerve health    Sleep health      The participant has a personal diabetes care record to keep abreast of diabetes health Beginning record today    The participant needs to address n/a at this time.              Benedicto Velasquez RN on 2/4/2021 at 3:38 PM      Time in appointment: 92 minutes

## 2021-02-11 ENCOUNTER — CLINICAL SUPPORT (OUTPATIENT)
Dept: DIABETES SERVICES | Age: 41
End: 2021-02-11
Payer: COMMERCIAL

## 2021-02-11 DIAGNOSIS — E11.9 TYPE 2 DIABETES MELLITUS WITHOUT COMPLICATION, WITHOUT LONG-TERM CURRENT USE OF INSULIN (HCC): Primary | ICD-10-CM

## 2021-02-11 PROCEDURE — G0109 DIAB MANAGE TRN IND/GROUP: HCPCS | Performed by: DIETITIAN, REGISTERED

## 2021-02-11 NOTE — PROGRESS NOTES
[address] [place] [address] Program for Diabetes Health  Diabetes Self-Management Education & Support Program  Encounter note    SUMMARY  Diabetes self-care management training was completed related to Healthy eating. The participant will return on 2/18/21 to continue DSMES related to Monitoring, Physical activity and Taking medications. The participant did identify SMART Goal(s): - Use healthy plate to create at least 4 meals over the next week, and will practice knowledge and skills related to reducing risks, healthy eating, monitoring, being active, medications, healthy coping and problem solving to improve diabetes self-management. EVALUATION:  We started off the class today by having Mr. Gino Peck tell me his blood sugars and food intake over the last 3 days. They are as follows:  Monday:  Fasting BG - 125, did not have breakfast. Before lunch - 99, (12 pm) Chipotle chicken cauliflower bowl. 2 hr after dinner - 113, did not really have dinner but had ~3 handfuls of peanuts at around 6 pm.  Tuesday:  Fasting BG - 101, velasco egg and cheese biscuit from [facility]. Peanuts around 10:30 am. Before lunch - 82, (12pm) hamburger on wheat bread. Peanuts at 3:30 pm. Did not take 2 hr after dinner, had half bag of the grilled chicken salad mix from [facility] ( 6pm). Wednesday:  Fasting BG - 109, did not have breakfast but had a couple handfuls of peanuts at 10:30 am. Peanuts at 10 am. Before lunch - 117, (12 pm) half a bowl of pork tia w noodles and fried egg roll. Peanuts at 3 pm. 2 hr after dinner - 111, hamburger from [employer] with small ivy. Drinks water and Diet Coke most days. He is using [facility] and completed three 10 min work outs over the past week to complete his SMART goal 100%. He reports they felt good and is glad they have beginner workouts. He added that over the past week the lowest blood sugar he has seen was 84 and the highest was 137.   He eats out at least once a day but is making better choices and he goes to fast food places like [facility] about twice a week, which is a lot less than he was before. He asked if this was \"bad,\" to which I replied he is making better choices and not going to these places every day, so if it is something he wants to include in his life and can still maintain blood sugars and watch for sodium/saturated fat, then there is nothing wrong with it. We went into a lot of detail about sodium and saturated fat and he verbalized a better understanding of how sodium and SF can affect heart health. I said if he wants to cut out fast food completely that is fine but we want him to continue on the path he is on and not get burnt out. So if that means having fast food a couple of time per week to avoid going every day, then we will make it work and also talk about healthier options at those places. We reviewed exploring new seasonings instead of just butter or salt, choosing healthy fats more often, choosing whole grains over white/processed 50% of the time every day, and carbohydrate counting and portion sizes using Healthy Plate. After discussing information about carbohydrates and effects on blood sugar, Mr. Jimena Jo said he realized he was probably not getting enough at his meals and is open to start adding certain foods that he has been avoiding back into his diet (corn, yogurt). However, he denies being hungry a lot and that is why he skips some meals or only eats once a day. Before he made these changes, he was hungry for almost every meal, but now he is not. We discussed how it may take time for his body to adjust to these changes but if he eats 3 meals a day consistently his body will begin to get used to it and eventually start getting hungry. Mr. Jimena Jo said it was a lot of information to take in and we discussed that just because we give him the information does not mean he needs to start applying it immediately every day. It will take time and we will do it in baby steps.  I emphasized that since his goal is to use Healthy Plate 4x over the next week that he should only focus on that as not to get overwhelmed and that we can focus on eating 3 meals a day for another class if he wants. RECOMMENDATIONS:  Pt would benefit from introducing healthy plate into his meals      Next provider visit is scheduled for 2/18/21 at 8:30 am       SMART GOAL(S)  1. Walk for 10 mins 3 times over the next week  ACHIEVEMENT OF GOAL(S)  [] 0-24%     [] 25-49%     [] 50-74%     [x] %     DATE DSMES TOPIC EVALUATION     2/11/2021 WHAT CAN I EAT?   a. General principles   b. Determining a healthy weight   c. Nutritional terms & tools    Healthy Plate method    Carbohydrate Counting    Reading food labels    Free apps   d. Pregnancy recommendations      The participant    Uses Healthy Plate principles in constructing meals No   Reads food labels in choosing acceptable foods Yes    The participant needs to address increasing carbohydrates and vegetables at meals as well as increasing his number of meals per day.        Adrien Botello RD on 2/11/2021 at 11:08 AM    Time in appointment: 97 minutes

## 2021-02-12 ENCOUNTER — VIRTUAL VISIT (OUTPATIENT)
Dept: PRIMARY CARE CLINIC | Age: 41
End: 2021-02-12
Payer: COMMERCIAL

## 2021-02-12 DIAGNOSIS — I10 ESSENTIAL HYPERTENSION: ICD-10-CM

## 2021-02-12 DIAGNOSIS — F33.1 MODERATE EPISODE OF RECURRENT MAJOR DEPRESSIVE DISORDER (HCC): ICD-10-CM

## 2021-02-12 DIAGNOSIS — E66.01 OBESITY, MORBID (HCC): ICD-10-CM

## 2021-02-12 DIAGNOSIS — E78.2 MIXED HYPERLIPIDEMIA: ICD-10-CM

## 2021-02-12 DIAGNOSIS — E11.9 DIABETES MELLITUS, NEW ONSET (HCC): Primary | ICD-10-CM

## 2021-02-12 PROCEDURE — 99214 OFFICE O/P EST MOD 30 MIN: CPT | Performed by: INTERNAL MEDICINE

## 2021-02-12 RX ORDER — ATORVASTATIN CALCIUM 10 MG/1
10 TABLET, FILM COATED ORAL DAILY
Qty: 90 TAB | Refills: 0 | Status: SHIPPED | OUTPATIENT
Start: 2021-02-12 | End: 2021-05-09

## 2021-02-12 RX ORDER — LOSARTAN POTASSIUM 50 MG/1
50 TABLET ORAL DAILY
Qty: 90 TAB | Refills: 0 | Status: SHIPPED | OUTPATIENT
Start: 2021-02-12 | End: 2021-05-09

## 2021-02-13 NOTE — PROGRESS NOTES
Nabil Carbone (: 1980) is a 39 y.o. male, established patient, here for evaluation of the following chief complaint(s):   Follow up      ASSESSMENT/PLAN:  1. Diabetes mellitus, new onset ([facility] [address]  We'll continue the same dose of Metformin for now. As his blood sugars are running well within the normal range we will have a follow-up appointment in April. However, he will continue seeing a diabetic educator. 2. Essential hypertension  -     losartan (COZAAR) 50 mg tablet; Take 1 Tab by mouth daily for 90 days. , Normal, Disp-90 Tab, R-0  Recommended checking blood pressure readings at home and making an early appointment if readings are still above 150/90.    3. Moderate episode of recurrent major depressive disorder [facility])  He is seeing a psychiatrist Marycruz Knight. He has stopped taking Abilify and thinks Trintellix is helping with the depression. 4. Obesity, morbid ([facility] [address]  I commended him on his weight loss efforts as he has lost 11 pounds since last seen in the office. Encouraged physical activity along with diabetic diet. 5. Mixed hyperlipidemia  -     atorvastatin (LIPITOR) 10 mg tablet; Take 1 Tab by mouth daily for 90 days. , Normal, Disp-90 Tab, R-0. Potential side effects discussed with him. He will let me know through my chart in 2 to 3 weeks if he is experiencing any body aches or joint pains and we will switch him to a low potency statin. SUBJECTIVE/OBJECTIVE:  Patient seen today for follow-up. He has been feeling much better since we started him on Metformin. His urinary frequency and increased thirst have improved significantly. He is not feeling as tired as he used to be. His blood sugars have improved significantly today morning his blood sugar was 106 yesterday before lunch 86. He is very happy with his diabetic sessions and thinks he is getting a good grasp of carb calculation now.   His cholesterol was high on his last blood report but we did not start him on a medication because I did not want him to start  2-3 new medication at the same time. He is willing to try Lipitor. His blood pressure readings have been running between 140-150/80-90 range. He is taking losartan 25 mg daily dose. HPI    Review of Systems   Constitutional: Negative for appetite change and fatigue. HENT: Negative for congestion and sore throat. Eyes: Negative for photophobia and visual disturbance. Respiratory: Negative for chest tightness and shortness of breath. Cardiovascular: Negative for chest pain and leg swelling. Endocrine: Negative for polydipsia, polyphagia and polyuria. Genitourinary: Negative for hematuria and urgency. Musculoskeletal: Negative for arthralgias and myalgias. Neurological: Negative for dizziness, speech difficulty, weakness and headaches. Psychiatric/Behavioral: Negative for behavioral problems and sleep disturbance. The patient is not nervous/anxious.            Patient-Reported Vitals 2/12/2021   Patient-Reported Weight 320lb   Patient-Reported Height -   Patient-Reported Systolic  634   Patient-Reported Diastolic 96       Physical Exam    [INSTRUCTIONS:  \"[x]\" Indicates a positive item  \"[]\" Indicates a negative item  -- DELETE ALL ITEMS NOT EXAMINED]    Constitutional: [x] Appears well-developed and well-nourished [x] No apparent distress      [] Abnormal -     Mental status: [x] Alert and awake  [x] Oriented to person/place/time [x] Able to follow commands    [] Abnormal -     Eyes:   EOM    [x]  Normal    [] Abnormal -   Sclera  [x]  Normal    [] Abnormal -          Discharge [x]  None visible   [] Abnormal -     HENT: [x] Normocephalic, atraumatic  [] Abnormal -   [x] Mouth/Throat: Mucous membranes are moist    External Ears [x] Normal  [] Abnormal -    Neck: [x] No visualized mass [] Abnormal -     Pulmonary/Chest: [x] Respiratory effort normal   [x] No visualized signs of difficulty breathing or respiratory distress        [] Abnormal - Musculoskeletal:   [x] Normal gait with no signs of ataxia         [x] Normal range of motion of neck        [] Abnormal -     Neurological:        [x] No Facial Asymmetry (Cranial nerve 7 motor function) (limited exam due to video visit)          [x] No gaze palsy        [] Abnormal -          Skin:        [x] No significant exanthematous lesions or discoloration noted on facial skin         [] Abnormal -            Psychiatric:       [x] Normal Affect [] Abnormal -        [x] No Hallucinations    Sarah Roberson is being evaluated by a Virtual Visit (video visit) encounter to address concerns as mentioned above. Due to this being a TeleHealth encounter (During [facility] public health emergency), evaluation of the following organ systems was limited: Vitals/Constitutional/EENT/Resp/CV/GI//MS/Neuro/Skin/Heme-Lymph-Imm. Pursuant to the emergency declaration under the [address] [place] [address] authority and the [employer] and [employer] Act, this Virtual Visit was conducted with patient's (and/or legal guardian's) consent, to reduce the patient's risk of exposure to COVID-19 and provide necessary medical care. The patient (and/or legal guardian) has also been advised to contact this office for worsening conditions or problems, and seek emergency medical treatment and/or call 911 if deemed necessary. Patient identification was verified at the start of the visit: YES    Services were provided through a video synchronous discussion virtually to substitute for in-person clinic visit. Patient was located at home and provider was located  at home. Please note that this dictation was completed with [email or web address], the computer voice recognition software. Quite often unanticipated grammatical, syntax, homophones, and other interpretive errors are inadvertently transcribed by the computer software. Please disregard these errors.   An electronic signature was used to authenticate this note.   -- Magdiel Villa MD

## 2021-02-23 ENCOUNTER — CLINICAL SUPPORT (OUTPATIENT)
Dept: DIABETES SERVICES | Age: 41
End: 2021-02-23
Payer: COMMERCIAL

## 2021-02-23 DIAGNOSIS — E11.9 TYPE 2 DIABETES MELLITUS WITHOUT COMPLICATION, WITHOUT LONG-TERM CURRENT USE OF INSULIN (HCC): Primary | ICD-10-CM

## 2021-02-23 PROCEDURE — G0108 DIAB MANAGE TRN  PER INDIV: HCPCS

## 2021-02-23 NOTE — PROGRESS NOTES
[address] [place] [address] for Diabetes Health  Diabetes Self-Management Education & Support Program  Encounter note    SUMMARY  Diabetes self-care management training was completed related to Monitoring, Physical activity and Taking medications. The participant will return on 2/25/2021 to continue DSMES related to Healthy coping and Problem solving. The participant did identify SMART Goal(s): - continue to use Healthy Plate for 1 meal and complete 1 Apple workout, and will practice knowledge and skills related to reducing risks, healthy eating and monitoring, being active and medications, healthy coping and problem solving, healthy eating, monitoring, being active, medications and healthy coping to improve diabetes self-management. EVALUATION:  Mr. Isac Erickson fasting BGLs range from 90s-120s, before lunch BGLs range from 100-110, and after dinner BGLs range from 100s-130. Based on his 24 hour recall, Mr. Pipe Acevedo is not including enough (if any) carbs at lunch or at dinner, for example he had chicken and broccoli with cheese for dinner last night, but no carbs at all. We reviewed the importance of including carbs at each meal, he expressed understanding and will continue to use Healthy Plate to help balance his nutrition and include carbs/control the portions of carbs. He says he feels great, no cravings or hunger, but does find the routine of cooking dinner/meal prep tedious and time consuming. He is considering trying a meal service like [employer] to cut down on prep, shopping, and planning. He used nutrisystem in the past but quickly became bored with the meal choices. Mr. Pipe Acevedo continues to enjoy Chipotle bowels with cauliflower or brown rice as a base, we discussed the difference in carb content between these two choices in light of the lack of carbs in his diet overall, he expressed understanding.   He continues to skip breakfast several times during the week, but states he is \"working on\" eating three meals a day.    Today we discussed monitoring, medications, and physical activity. Mr. Coral Vasquez is doing a great job of monitoring his BGLs 3x/day - he would like to cut back to checking 2x/day, I advised he wait until speaking with Dr. Noreen Witt at his appointment in April. He will bring his BGL logs with him to that appointment. He is taking his metformin regularly but does miss a dose about 1x every 2 weeks, he expressed understanding how the medication works in his body and the 15:15 Rule to treat hypoglycemia - he denies any occurrences of hypoglycemia. He understands when to check his BGLs to see the impact of a meal, physical activity, medication, or stressor. He is aware of his BGL target ranges. Mr. Coral Vasquez is still using the Apple workout ivan, beginner level and completing 1-3 workouts per week. He says his barriers to working out include feeling too tired after work and lack of motivation. However, he also stated that since beginning the metformin he feels like he is less fatigued overall and does not nap after work like he was apt to do before. In terms of motivation, Mr. Coral Vasquez used to compete against a co-worker for time spent working out and calories burned, but since Covid-19 and working from home, they have not been in contact on a regular basis. I suggested looking online for a virtual walking club or other workout group that allows him to chart his progress against others if he is looking for something competitive to motivate him, he agreed to see what might be available. Overall, Mr. Coral Vasquez is doing a great job utilizing the information he is getting in class. RECOMMENDATIONS:  Mr. Coral Vasquez should include adequate carbs with each meal and continue to work on eating breakfast most days. Next provider visit is scheduled for 2/25/2021       SMART GOAL(S)  1. Walk for 10 minutes 3 times over the next 7 days. ACHIEVEMENT OF GOAL(S)  []? 0-24%     []? 25-49%     [x]? 50-74%     []? %  2. Use Healthy Plate 4x over the next 7 days. ACHIEVEMENT OF GOAL(S)  []? 0-24%     [x]? 25-49%     []? 50-74%     []? %  3. Use Healthy Plate 1x and workout 1x before next visit in 2 days. ACHIEVEMENT OF GOAL(S)  []? 0-24%     []? 25-49%     []? 50-74%     []? %       DATE DSMES TOPIC EVALUATION     2/23/2021 HOW CAN BLOOD GLUCOSE MONITORING HELP ME?   a. Value of blood glucose monitoring   b. Realistic expectations   c. Blood glucose monitoring targets   d. Target adjustments   e. Setting a1c & blood glucose targets with provider   f. Meter selection    g. Technique for obtaining blood droplet   h. Blood glucose testing sites   i. Determining best times to test   j. Pregnancy recommendations   k. Data sharing with provider        The participant    Can demonstrate their glucometer procedure Yes   Logs their BG readings Yes    The participant needs to address n/a at this time. DATE DSMES TOPIC EVALUATION     2/23/2021 HOW DO MY DIABETES MEDICATIONS WORK?   a. Type 1 medications & methods    Insulin injections    Injection sites   b. Type 2 medications    Oral agents    GLP-1 agonists   c. Hypoglycemia symptoms & treatment    Glucagon emergency kits   d. General guidance regarding insulin use whether Type 1, 2 or gestational diabetes    Storage of insulin    Disposal     Traveling with medications   e. Barriers to medication adherence      The participant    Can describe the expected action & side effects of prescribed diabetes medications Yes.  Can demonstrate injection technique (if applicable) Yes. The participant needs to address remembering am dose of Metformin. DATE Path101ES TOPIC EVALUATION     2/23/2021 HOW DOES PHYSICAL ACTIVITY AFFECT MY DIABETES?   a. Benefits of physical activity   b.  Beginning a program of physical activity    Walking    Pedometers    Goal setting   c. Structured physical activity program    Aerobic activity    Resistance  Flexibility    Balance   d. Physical activity program progression   e. Safety issues   f. Barriers to physical activity   g. Facilitators of physical activity        The participant has established a regular physical activity plan Yes    The participant needs to address continuing to include physical activity in his schedule each week .      Nany Luna RN on 2/23/2021 at 11:13 AM    Time in appointment: 90 minutes

## 2021-02-25 ENCOUNTER — CLINICAL SUPPORT (OUTPATIENT)
Dept: DIABETES SERVICES | Age: 41
End: 2021-02-25
Payer: COMMERCIAL

## 2021-02-25 DIAGNOSIS — E11.9 TYPE 2 DIABETES MELLITUS WITHOUT COMPLICATION, WITHOUT LONG-TERM CURRENT USE OF INSULIN (HCC): Primary | ICD-10-CM

## 2021-02-25 PROCEDURE — G0109 DIAB MANAGE TRN IND/GROUP: HCPCS

## 2021-02-25 NOTE — PROGRESS NOTES
[facility] for Diabetes [facility]  Diabetes Self-Management Education & Support Program  Encounter note    SUMMARY  Diabetes self-care management training was completed related to Healthy coping and Problem solving. The participant will return on 4/12/2021 for his follow-up visit. The participant did identify SMART Goal(s): - Continue to use Healthy Plate and carb counting to control carbohydrate portions and explore alternatives for physical activity that would be fun and add variety to current regimen. , and will practice knowledge and skills related to reducing risks, healthy eating and monitoring, being active and medications and healthy coping and problem solving to improve diabetes self-management. EVALUATION:  Mr. Michelle Randhawa fasting BGLs continue to look great running in the 110s, his before meal numbers run in the 100s, and before bed he is seeing 110s. In our visit today we covered coping skills and problem solving. Mr. Rebeca Chew states that when he was first diagnosed with DM he was \"confused, shocked, and disappointed,\" but at this time after completing his DSMES sessions he says \"to me, I feel I can beat it (DM). I can eat better and be more active. \" He is feeling optimistic about self-management and his ability to delay or prevent the long-term risks associated with DM. He is aware that he will remain diagnosed with DM even if his A1c drops below the 6.5%. He has a solid support system of friends and family that are helping him stay motivated. In terms of stress management, Mr. Rebeca Chew says he is able to get enough sleep, he uses positive self-talk/affirmations, takes regular breaks from sitting at his desk and working, and enjoys watching movies or TV comedy on the weekends for fun. He states he is struggling with maintaining a regular routine for physical activity because he is not motivated to workout in the morning before work and feels mentally spent at the end of the work day.   We discussed having a variety of workout options to keep it interesting and workout at lunchtime since he works from home and feels more energetic in the middle of the day, he is willing to give this a try. He finds meal prep tedious and time consuming, which makes ordering in very tempting, so he is considering using [employer] to have pre-prepped foods delivered to his home weekly to make cooking more convenient. He has used Healthy Plate at dinner for the past two nights and states it is useful to him and he is making great decisions when eating out to control his carb portions, as well. Mr. Al Schultz continues to skip breakfast except on the weekends when he may make scrambled eggs using 4 eggs, he was advised to opt for a variety of proteins throughout the week versus choosing eggs at every breakfast as he includes breakfast more frequently. He expressed understanding and ways he can include adequate carbs with breakfast.    Mr. Al Schultz denies any episodes of hypo or hyperglycemia in the last 4 weeks. His previous symptoms related to chronic hyperglycemia have resolved. He is able to verbalize the 15:15 Rule for treating hypoglycemia and knows how to use BGL patterns and more frequent BGL testing to determine the reasons for hyper or hypoglycemia. He is aware of how to be prepared in the event of a disaster to care for his DM. Overall, Mr. Al Schultz is doing very well with self-management and demonstrates a desire to maintain better health and in target range BGLs. RECOMMENDATIONS:  Mr. Al Schultz should continue to check his BGLs until directed by his provider to do otherwise. He should also try to have fun with his physical activity regimen. Next provider visit is scheduled for 4/12/2021       SMART GOAL(S)  1. Walk for 10 minutes 3 times over the next 7 days. ACHIEVEMENT OF GOAL(S)  []?? 0-24%     []? ? 25-49%     []? ? 50-74%     [x]? ? %  2.    Use Healthy Plate 4x over the next 7 days.    ACHIEVEMENT OF GOAL(S)   []?? 0-24%     [x]? ? 25-49%     []? ? 50-74%     [x]? ? %  3.    Use Healthy Plate 1x and workout 1x before next visit in 2 days. ACHIEVEMENT OF GOAL(S)  []?? 0-24%     []? ? 25-49%     []? ? 50-74%     [x]? ? %        DATE DSMES TOPIC EVALUATION     2/25/2021 HOW DO I FIND SUPPORT TO TACKLE THIS CONDITION?   a. Normal responses to diabetes diagnosis or complication    Shock    Anger & resentment    Guilt/self-blame    Sadness & worry    Depression     Anxiety    Pregnancy   b. Constructive strategies to normal responses     Exploring feelings & attitudes    Motivation: Cost versus benefits analysis    Problem-solving: Chain analysis    Obtaining support: Communicating   i. Family & friends   ii. Health team   iii. Community   c. Stress    Symptoms    Managing stress    Fill your tool kit        The participant can identify people that support them in caring for their diabetes health:  Friends and family, some of whom also have diabetes    The participant would like to pursue the following in keeping themselves healthy after completing the program:  Trying a meal prep service to make cooking less tedious and more convenient. The participant needs to address including breakfast more often. DATE DSMES TOPIC EVALUATION     2/25/2021 HOW DO I FIGURE OUT WHAT'S INFLUENCING MY BLOOD GLUCOSES?   a. Problem solving using SOAR    Set goals    Sort options    Arrive at a plan    Reaffirm plan   b. Common problems in diabetes self-care    Hypoglycemia    Hyperglycemia    Sick days   c. Pattern management   d. Disaster preparedness plan        The participant has an action plan for    Hypoglycemia Yes   Hyperglycemia Yes   Sick days Yes   During disasters Yes    The participant needs to address n/a at this time.      Brittany Bah RN on 2/25/2021 at 10:33 AM    [facility] for Telehealth:    Time in appointment: 80 minutes

## 2021-03-01 DIAGNOSIS — E11.9 DIABETES MELLITUS, NEW ONSET (HCC): ICD-10-CM

## 2021-03-01 DIAGNOSIS — I10 ESSENTIAL HYPERTENSION: ICD-10-CM

## 2021-03-01 RX ORDER — LOSARTAN POTASSIUM 50 MG/1
50 TABLET ORAL DAILY
Qty: 90 TAB | Refills: 0 | Status: CANCELLED | OUTPATIENT
Start: 2021-03-01 | End: 2021-05-30

## 2021-03-01 RX ORDER — METFORMIN HYDROCHLORIDE 850 MG/1
TABLET ORAL
Qty: 180 TAB | Refills: 0 | Status: SHIPPED | OUTPATIENT
Start: 2021-03-01 | End: 2021-05-26

## 2021-03-30 DIAGNOSIS — E11.9 DIABETES MELLITUS, NEW ONSET (HCC): ICD-10-CM

## 2021-03-30 RX ORDER — IBUPROFEN 200 MG
CAPSULE ORAL
Qty: 100 STRIP | Refills: 1 | Status: SHIPPED | OUTPATIENT
Start: 2021-03-30 | End: 2021-05-23

## 2021-03-31 DIAGNOSIS — E11.9 CONTROLLED TYPE 2 DIABETES MELLITUS WITHOUT COMPLICATION, WITHOUT LONG-TERM CURRENT USE OF INSULIN (HCC): Primary | ICD-10-CM

## 2021-03-31 RX ORDER — BLOOD-GLUCOSE CONTROL, NORMAL
EACH MISCELLANEOUS
Qty: 100 LANCET | Refills: 5 | Status: SHIPPED | OUTPATIENT
Start: 2021-03-31

## 2021-04-08 DIAGNOSIS — E55.9 VITAMIN D DEFICIENCY: ICD-10-CM

## 2021-04-08 DIAGNOSIS — E11.9 CONTROLLED TYPE 2 DIABETES MELLITUS WITHOUT COMPLICATION, WITHOUT LONG-TERM CURRENT USE OF INSULIN (HCC): ICD-10-CM

## 2021-04-08 DIAGNOSIS — E78.2 MIXED HYPERLIPIDEMIA: Primary | ICD-10-CM

## 2021-04-08 DIAGNOSIS — Z11.59 NEED FOR HEPATITIS C SCREENING TEST: ICD-10-CM

## 2021-04-12 ENCOUNTER — VIRTUAL VISIT (OUTPATIENT)
Dept: DIABETES SERVICES | Age: 41
End: 2021-04-12

## 2021-04-12 ENCOUNTER — VIRTUAL VISIT (OUTPATIENT)
Dept: PRIMARY CARE CLINIC | Age: 41
End: 2021-04-12
Payer: COMMERCIAL

## 2021-04-12 DIAGNOSIS — E11.9 TYPE 2 DIABETES MELLITUS WITHOUT COMPLICATION, WITHOUT LONG-TERM CURRENT USE OF INSULIN (HCC): Primary | ICD-10-CM

## 2021-04-12 DIAGNOSIS — I10 ESSENTIAL HYPERTENSION: ICD-10-CM

## 2021-04-12 DIAGNOSIS — E66.01 OBESITY, MORBID (HCC): ICD-10-CM

## 2021-04-12 DIAGNOSIS — E11.9 CONTROLLED TYPE 2 DIABETES MELLITUS WITHOUT COMPLICATION, WITHOUT LONG-TERM CURRENT USE OF INSULIN (HCC): Primary | ICD-10-CM

## 2021-04-12 DIAGNOSIS — F32.89 OTHER DEPRESSION: ICD-10-CM

## 2021-04-12 PROCEDURE — G0108 DIAB MANAGE TRN  PER INDIV: HCPCS

## 2021-04-12 PROCEDURE — 99214 OFFICE O/P EST MOD 30 MIN: CPT | Performed by: INTERNAL MEDICINE

## 2021-04-12 NOTE — PROGRESS NOTES
[facility] for Diabetes Health  Diabetes Self-Management Education & Support Program  Post-program evaluation    EVALUATION @ COMPLETION OF THE PROGRAM    Montez Stewart completed 6 hours of diabetes self-management education. The participant acquired new knowledge and demonstrated new skills during the program.     The participant worked on the following SMART GOAL(s) to improve their diabetes self-management:    1. Walk for 10 minutes 3 times over the next 7 days. ACHIEVEMENT OF GOAL(S)  []? ?? 0-24%     []??? 25-49%     []? ?? 50-74%     [x]? ?? %  2.    Use Healthy Plate 4x over the next 7 days.   ACHIEVEMENT OF GOAL(S)   []? ?? 0-24%     [x]? ?? 25-49%     []? ?? 50-74%     [x]? ?? %  3.    Use Healthy Plate 1x and workout 1x before next visit in 2 days. ACHIEVEMENT OF GOAL(S)  []? ?? 0-24%     []??? 25-49%     []? ?? 50-74%     [x]? ?? %    The participant improved their Diabetes Skills, Confidence and Preparedness Index (scored out of 7): Total score: 6.6  Skills: 6.4  Confidence: 6.6  Preparedness: 7.0    Improvement in all areas      FINAL RECOMMENDATIONS:  Mr. Julio César Barger has done a great job of applying the knowledge he gained from the [facility] sessions. He continues to use Healthy Plate and counting carbs, and states that he is familiar enough with the content of his favorite meals that he no longer needs to stop and calculate the carbs, he just knows what they are. He has started using [employer], a food subscription service that delivers meal components to his home weekly. He tells me it has made eating a well balanced dinner much more convenient, consistent, and helps him keep his dinner carbs in range. Mr. Julio César Barger is currently walking 2-3 times a week for 30 minutes at a time. Now that he has been walking regularly for about 5-6 weeks, he should look to increase the frequency and duration of his work-outs as he enters the improvement stage.   He finds walking to be a good aerobic activity and a great stress reliever. To date, he has lost 15 lbs since starting DSMES. Mr. Lucinda Mar fasting BGLs range from 100-110s, his before pre-lunch BGLs are in the 100s consistently, and post-dinner BGLs have not been greater than 150. He states he get his A1c lab done this week. He denies any episodes of hypo or hyperglycemia. He is taking all medications as directed, never misses a dose, and understands what each does. Next provider visit is scheduled for [address], RN on 4/12/2021 at 11:05 AM    Metric Patient's responses (4/12/2021) Areas for improvement     Healthy Eating       The participant is using the Healthy Plate to control carbohydrate intake Yes, And states he has become accustomed to what his meal look like in terms of portions that he can apply HP without having to think about it, it has become routine    The participant reads food labels accurately Yes   N/a at this time       Being Active       The participant performs physical activity where your heart beats faster and your breathing is harder than normal for 30 minutes or more? 3 days    In a typical week, the participant performs physical activity 3 days   Continue to increase duration and frequency of walks. Add in other physical activity for variety. Monitoring   The participant is monitoring their blood sugars? Yes    The participant is monitoring 3x/day    Blood glucoses are ranging:   Breakfast: 100-110 mg/dL  Lunch: 100s mg/dL BEFORE LUNCH  Dinner: 120s, never higher than 150s mg/dL AFTER DINNER  Bedtime: n/a mg/dL    The participant improved their A1c Lab ordered last week, pt to go this week    The participant understands the meaning of the A1c Yes     N/a at this time.      Taking Medications   The participant understands what their diabetes medications do Yes    The participant can afford your diabetes medications Yes    The participant does not miss doses of their diabetes medications Never     N/a at this time. Healthy Coping     The participant feels supported by family, friends and others related to their diabetes self-care practices Yes    The participant plans on utilizing the following community resources after completion of the program: Calorie Laine Baker and friends, family   N/a at this time. Reducing Risks   Vaccines:  Influenza:   Immunization History   Administered Date(s) Administered    Influenza Vaccine Cupid-Labs) PF (>6 Mo Flulaval, Fluarix, and >3 Yrs Afluria, Fluzone 51004) 11/14/2018       Pneumococcal: There is no immunization history for the selected administration types on file for this patient. Hepatitis: There is no immunization history for the selected administration types on file for this patient. Examinations:  Diabetic Foot and Eye Exam HM Status   Topic Date Due    Diabetic Foot Care  01/12/2022    Eye Exam  02/01/2023        Dental exam: DUE    Foot exam: Last appointment was: 1/6/2021    Heart Protection:  BP Readings from Last 2 Encounters:   01/12/21 (!) 142/90   01/05/21 (!) 146/96        Lab Results   Component Value Date/Time    LDL, calculated 205.8 (H) 01/06/2021 09:15 AM        Key Anti-Platelet Anticoagulant Meds     The patient is on no antiplatelet meds or anticoagulants. Kidney Protection:  Lab Results   Component Value Date/Time    Microalbumin/Creat ratio (mg/g creat) 31 (H) 01/06/2021 09:15 AM    Microalbumin,urine random 5.68 01/06/2021 09:15 AM      The participant would benefit from additional attention to:    Vaccines:  [x] Influenza  [] Pneumococcal  [] Hepatitis    Examinations:  [] Dilated eye exam  [x] Dental exam  [] Foot exam    Other:  [] Reviewing long-term complications     Problem Solving   Hypoglycemia Management:  The participant knows the signs and symptoms of hypoglycemia Pt denies any episodes of hypoglycemia since DSMES concluded. The participant knows how to prevent hypoglycemia?  Consistent meals/snack times, Take medications as instructed and Monitor blood glucose before exercise    The participant knows how to treat hypoglycemia? Rule of 15    Hyperglycemia Management:  The participant knows their signs and symptoms of hyperglycemia Patient denies any episodes of hyperglycemia since DSMES concluded. The participant knows how to prevent hyperglycemia? Take medication as instructed, Focus on carbohydrate counting/meal planning, Maintain a healthy weight, Engage in regular physically active, Monitor blood glucose    Sick Day Management:  The participant knows what to do differently on sick days? Stay hydrated, Check blood glucose every 2-4 hours, Eat meals, soft foods, or drink caloric beverages every 4 hours, Take diabetes medication as instructed by provider, Take over-the-counter medications as instructed by provider    Pattern Management:  The participant can notice blood glucose patterns when looking at their blood glucose readings Yes           Note: Content derived from the American Association of Diabetes Educators' Diabetes Education Curriculum: A Guide to Successful Self-Management (2nd edition)      [facility] for Telehealth:    Urvashi Mckeon is a 39 y.o. male being evaluated through a synchronous (real-time) audio-video technology platform, as a substitution for an in-person encounter, to address the healthcare issues mentioned above. A caregiver was present when appropriate. I was in the office. The patient was at home. The patient and/or his healthcare decision maker, is aware that this patient-initiated, Telehealth encounter on 4/12/2021 is a billable service, with coverage as determined by his insurance carrier. He is aware that he may receive a bill and has provided verbal consent to proceed: Yes. This telehealth encounter occurred during the COVID-19 pandemic and public health emergency.  Evaluation of the following organ systems was limited: Vitals/Constitutional/EENT/Resp/CV/GI//MS/Neuro/Skin/Heme-Lymph-Imm. Pursuant to the emergency declaration under the [address] [place] [address], [address] [place] [address] authority and the [employer] Act, this Virtual Visit was conducted with patient's (and/or legal guardian's) consent, to reduce the risk of exposure to COVID-19 and provide necessary medical care.      Time in appointment: 30 minutes

## 2021-04-12 NOTE — PROGRESS NOTES
Nasreen Reyes (: 1980) is a 39 y.o. male, established patient, here for evaluation of the following chief complaint(s):   Follow Up Chronic Condition     Written by Pako Mendosa, as dictated by Dr. Jaylyn Diaz MD.    ASSESSMENT/PLAN:  1. Controlled type 2 diabetes mellitus without complication, without long-term current use of insulin (HCC)  Stable, he continues on metformin and has been making progress with his diet and exercise. He will get his blood work done today. Once labs will come will decide if he should continue Metformin current dose or add another agents. 2. Essential hypertension  I instructed pt to check his BP daily for 3 weeks, checking at different times of the day. He should keep a log of his readings and send it to me through [address] If his SBP is running above 140, we will need to discuss adding on another BP medication. 3. Obesity, morbid ([address]  I commended the pt on his healthy lifestyle choices and routines. Explained that 5,000 steps are needed daily for healthy lifestyle and to maintain conditioning, and he will need to increase to 10,000 a day to work on weight loss. 4. Other depression  Stable, continues on Trintellix every day. SUBJECTIVE/OBJECTIVE:  HPI  Pt presents virtually today to follow up on his weight loss efforts. He has lost weight from 331 lb on 21 to 316 lb today. He has been working with a dietician and is happy with his progress in his nutrition. Things are getting busier for him at work, but he is working on maintaining his healthy dietary routines. His BS is averaging around 110 and he is in range 90% of the time. He has some occasional high readings but has always been below 180. He continues on metformin, and his sx of increased thirst and frequent urination have resolved. He had some diarrhea when he first started metformin, but not anymore.      His back pain is still persistent, and he notes that he is not consistent about taking vitamin D, since he has so many other medications. Since starting his new diet and lifestyle changes he has been sleeping better at night and feeling less sleepy during that day. He has not been needing armodafinil anymore. He continues on losartan 50 mg every day and his BP was 155/99 last time he checked it. His depressive sx have been improving, and he continues on Trintellix 20 mg every day. He got the Freedu.in vaccine on 03/12/21 and 04/05/21. Patient Active Problem List   Diagnosis Code    Hyperlipemia E78.5    Acne vulgaris L70.0    Depression F32.9    Obesity, morbid ([facility] E66.01    Attention and concentration deficit R41.840    LASHAUN treated with BiPAP G47.33        Current Outpatient Medications on File Prior to Visit   Medication Sig Dispense Refill    lancets 30 gauge misc Use as directed 100 Lancet 5    glucose blood VI test strips (blood glucose test) strip Check blood sugar 3 times a day 100 Strip 1    metFORMIN (GLUCOPHAGE) 850 mg tablet TAKE 1 TABLET BY MOUTH TWICE A DAY WITH MEALS 180 Tab 0    losartan (COZAAR) 50 mg tablet Take 1 Tab by mouth daily for 90 days. 90 Tab 0    atorvastatin (LIPITOR) 10 mg tablet Take 1 Tab by mouth daily for 90 days. 90 Tab 0    ARIPiprazole (ABILIFY) 2 mg tablet       Blood-Glucose Meter monitoring kit Check blood glucose 3 times a day 1 Kit 0    TRINTELLIX 20 mg tablet TK 1 T PO QD      cholecalciferol, vitamin D3, (VITAMIN D3) 2,000 unit tab Take  by mouth. No current facility-administered medications on file prior to visit.         Allergies   Allergen Reactions    Sulfa (Sulfonamide Antibiotics) Nausea and Vomiting       Past Medical History:   Diagnosis Date    Mixed hyperlipidemia     Obesity     Persistent disorder of initiating or maintaining sleep     Sleep apnea        Past Surgical History:   Procedure Laterality Date    HX ORTHOPAEDIC  '05    rt. shoulder - \"slap\" injury       Family History Problem Relation Age of Onset    Cancer Father         colon    Asthma Sister        Social History     Socioeconomic History    Marital status: SINGLE     Spouse name: Single    Number of children: 0    Years of education: Not on file    Highest education level: Not on file   Occupational History    Occupation: IT   Social Needs    Financial resource strain: Not on file    Food insecurity     Worry: Not on file     Inability: Not on file   [employer] needs     Medical: Not on file     Non-medical: Not on file   Tobacco Use    Smoking status: Never Smoker    Smokeless tobacco: Never Used   Substance and Sexual Activity    Alcohol use: Yes     Alcohol/week: 5.0 standard drinks     Types: 3 Standard drinks or equivalent, 2 Cans of beer per week     Comment: 4x week    Drug use: No    Sexual activity: Yes     Partners: Female   Lifestyle    Physical activity     Days per week: Not on file     Minutes per session: Not on file    Stress: Not on file   Relationships    Social connections     Talks on phone: Not on file     Gets together: Not on file     Attends Restoration service: Not on file     Active member of club or organization: Not on file     Attends meetings of clubs or organizations: Not on file     Relationship status: Not on file    Intimate partner violence     Fear of current or ex partner: Not on file     Emotionally abused: Not on file     Physically abused: Not on file     Forced sexual activity: Not on file   Other Topics Concern    Not on file   Social History Narrative    Not on file       No visits with results within 3 Month(s) from this visit.    Latest known visit with results is:   Orders Only on 01/06/2021   Component Date Value Ref Range Status    Sodium 01/06/2021 139  136 - 145 mmol/L Final    Potassium 01/06/2021 3.5  3.5 - 5.1 mmol/L Final    Chloride 01/06/2021 104  97 - 108 mmol/L Final    CO2 01/06/2021 27  21 - 32 mmol/L Final    Anion gap 01/06/2021 8  5 - 15 mmol/L Final    Glucose 01/06/2021 276* 65 - 100 mg/dL Final    BUN 01/06/2021 9  6 - 20 MG/DL Final    Creatinine 01/06/2021 1.04  0.70 - 1.30 MG/DL Final    BUN/Creatinine ratio 01/06/2021 9* 12 - 20   Final    GFR est AA 01/06/2021 >60  >60 ml/min/1.73m2 Final    GFR est non-AA 01/06/2021 >60  >60 ml/min/1.73m2 Final    Comment: Estimated GFR is calculated using the IDMS-traceable Modification of Diet in  Renal Disease (MDRD) Study equation, reported for both  Americans  (GFRAA) and non- Americans (GFRNA), and normalized to 1.73m2 body  surface area. The physician must decide which value applies to the patient.  Calcium 01/06/2021 9.2  8.5 - 10.1 MG/DL Final    Bilirubin, total 01/06/2021 1.0  0.2 - 1.0 MG/DL Final    ALT (SGPT) 01/06/2021 54  12 - 78 U/L Final    AST (SGOT) 01/06/2021 33  15 - 37 U/L Final    Alk.  phosphatase 01/06/2021 138* 45 - 117 U/L Final    Protein, total 01/06/2021 7.5  6.4 - 8.2 g/dL Final    Albumin 01/06/2021 3.8  3.5 - 5.0 g/dL Final    Globulin 01/06/2021 3.7  2.0 - 4.0 g/dL Final    A-G Ratio 01/06/2021 1.0* 1.1 - 2.2   Final    WBC 01/06/2021 5.6  4.1 - 11.1 K/uL Final    RBC 01/06/2021 5.36  4.10 - 5.70 M/uL Final    HGB 01/06/2021 14.9  12.1 - 17.0 g/dL Final    HCT 01/06/2021 46.2  36.6 - 50.3 % Final    MCV 01/06/2021 86.2  80.0 - 99.0 FL Final    MCH 01/06/2021 27.8  26.0 - 34.0 PG Final    MCHC 01/06/2021 32.3  30.0 - 36.5 g/dL Final    RDW 01/06/2021 14.4  11.5 - 14.5 % Final    PLATELET 77/53/6821 861  150 - 400 K/uL Final    MPV 01/06/2021 11.0  8.9 - 12.9 FL Final    NRBC 01/06/2021 0.0  0  WBC Final    ABSOLUTE NRBC 01/06/2021 0.00  0.00 - 0.01 K/uL Final    LIPID PROFILE 01/06/2021        Final    Cholesterol, total 01/06/2021 282* <200 MG/DL Final    Triglyceride 01/06/2021 231* <150 MG/DL Final    Comment: Based on NCEP-ATP III:  Triglycerides <150 mg/dL  is considered normal, 150-199  mg/dL  borderline high, 200-499 mg/dL high and  greater than or equal to 500  mg/dL very high.  HDL Cholesterol 01/06/2021 30  MG/DL Final    Comment: Based on NCEP ATP III, HDL Cholesterol <40 mg/dL is considered low and >60  mg/dL is elevated.  LDL, calculated 01/06/2021 205.8* 0 - 100 MG/DL Final    Comment: Based on the NCEP-ATP: LDL-C concentrations are considered  optimal <100 mg/dL,  near optimal/above Normal 100-129 mg/dL Borderline High: 130-159, High: 160-189  mg/dL Very High: Greater than or equal to 190 mg/dL      VLDL, calculated 01/06/2021 46.2  MG/DL Final    CHOL/HDL Ratio 01/06/2021 9.4* 0.0 - 5.0   Final    Hemoglobin A1c 01/06/2021 11.1* 4.0 - 5.6 % Final    Comment: NEW METHOD PLEASE NOTE NEW REFERENCE RANGE  (NOTE)  HbA1C Interpretive Ranges  <5.7              Normal  5.7 - 6.4         Consider Prediabetes  >6.5              Consider Diabetes      Est. average glucose 01/06/2021 272  mg/dL Final    Microalbumin,urine random 01/06/2021 5.68  MG/DL Final    No reference range has been established.  Creatinine, urine 01/06/2021 184.00  mg/dL Final    No reference range has been established.  Microalbumin/Creat ratio (mg/g cre* 01/06/2021 31* 0 - 30 mg/g Final    Vitamin D 25-Hydroxy 01/06/2021 16.6* 30 - 100 ng/mL Final    Comment: (NOTE)  Deficiency               <20 ng/mL  Insufficiency          20-30 ng/mL  Sufficient             ng/mL  Possible toxicity       >100 ng/mL    The Method used is Siemens Advia Centaur currently standardized to a   Center of Disease Control and Prevention (CDC) certified reference   [address]. Samples containing fluorescein dye can produce falsely   elevated values when tested with the ADVIA Centaur Vitamin D Assay. It is recommended that results in the toxic range, >100 ng/mL, be   retested 72 hours post fluorescein exposure. Review of Systems   Constitutional: Negative for activity change, fatigue and unexpected weight change.    HENT: Negative for congestion, ear discharge, ear pain, hearing loss, rhinorrhea and sore throat. Eyes: Negative for pain, discharge and redness. Respiratory: Negative for cough, chest tightness and shortness of breath. Cardiovascular: Negative for leg swelling. Gastrointestinal: Negative for abdominal pain, constipation and diarrhea. Endocrine: Negative for polyuria. Genitourinary: Negative for dysuria, flank pain and urgency. Musculoskeletal: Negative for arthralgias, back pain and myalgias. Skin: Negative for color change. Neurological: Negative for dizziness, light-headedness and headaches. Psychiatric/Behavioral: Negative for dysphoric mood and sleep disturbance. The patient is not nervous/anxious.          Patient-Reported Vitals 4/12/2021   Patient-Reported Weight 316lbs   Patient-Reported Height -   Patient-Reported Systolic  048   Patient-Reported Diastolic 99       Physical Exam    [INSTRUCTIONS:  \"[x]\" Indicates a positive item  \"[]\" Indicates a negative item  -- DELETE ALL ITEMS NOT EXAMINED]    Constitutional: [x] Appears well-developed and well-nourished [x] No apparent distress      [] Abnormal -     Mental status: [x] Alert and awake  [x] Oriented to person/place/time [x] Able to follow commands    [] Abnormal -     Eyes:   EOM    [x]  Normal    [] Abnormal -   Sclera  [x]  Normal    [] Abnormal -          Discharge [x]  None visible   [] Abnormal -     HENT: [x] Normocephalic, atraumatic  [] Abnormal -   [x] Mouth/Throat: Mucous membranes are moist    External Ears [x] Normal  [] Abnormal -    Neck: [x] No visualized mass [] Abnormal -     Pulmonary/Chest: [x] Respiratory effort normal   [x] No visualized signs of difficulty breathing or respiratory distress        [] Abnormal -      Musculoskeletal:   [x] Normal gait with no signs of ataxia         [x] Normal range of motion of neck        [] Abnormal -     Neurological:        [x] No Facial Asymmetry (Cranial nerve 7 motor function) (limited exam due to video visit)          [x] No gaze palsy        [] Abnormal -          Skin:        [x] No significant exanthematous lesions or discoloration noted on facial skin         [] Abnormal -            Psychiatric:       [x] Normal Affect [] Abnormal -        [x] No Hallucinations    Other pertinent observable physical exam findings:-    Shaheen Webber, was evaluated through a synchronous (real-time) audio-video encounter. The patient (or guardian if applicable) is aware that this is a billable service. Verbal consent to proceed has been obtained within the past 12 months. The visit was conducted pursuant to the emergency declaration under the [address] [place] [address], [address] [place] [address] authority and the [employer] General Act. Patient identification was verified, and a caregiver was present when appropriate. The patient was located in a state where the provider was credentialed to provide care. An electronic signature was used to authenticate this note.   -- Raquel Copeland

## 2021-05-09 DIAGNOSIS — E78.2 MIXED HYPERLIPIDEMIA: ICD-10-CM

## 2021-05-09 DIAGNOSIS — I10 ESSENTIAL HYPERTENSION: ICD-10-CM

## 2021-05-09 RX ORDER — ATORVASTATIN CALCIUM 10 MG/1
TABLET, FILM COATED ORAL
Qty: 90 TAB | Refills: 0 | Status: SHIPPED | OUTPATIENT
Start: 2021-05-09 | End: 2021-08-03

## 2021-05-09 RX ORDER — LOSARTAN POTASSIUM 50 MG/1
TABLET ORAL
Qty: 90 TAB | Refills: 0 | Status: SHIPPED | OUTPATIENT
Start: 2021-05-09 | End: 2021-08-03

## 2021-05-23 DIAGNOSIS — E11.9 DIABETES MELLITUS, NEW ONSET (HCC): ICD-10-CM

## 2021-05-23 RX ORDER — BLOOD SUGAR DIAGNOSTIC
STRIP MISCELLANEOUS
Qty: 100 STRIP | Refills: 1 | Status: SHIPPED | OUTPATIENT
Start: 2021-05-23 | End: 2021-07-23

## 2021-05-26 DIAGNOSIS — E11.9 DIABETES MELLITUS, NEW ONSET (HCC): ICD-10-CM

## 2021-05-26 RX ORDER — METFORMIN HYDROCHLORIDE 850 MG/1
TABLET ORAL
Qty: 180 TABLET | Refills: 0 | Status: SHIPPED | OUTPATIENT
Start: 2021-05-26 | End: 2021-09-17

## 2021-07-23 DIAGNOSIS — E11.9 DIABETES MELLITUS, NEW ONSET (HCC): ICD-10-CM

## 2021-07-23 RX ORDER — BLOOD SUGAR DIAGNOSTIC
STRIP MISCELLANEOUS
Qty: 100 STRIP | Refills: 1 | Status: SHIPPED | OUTPATIENT
Start: 2021-07-23 | End: 2021-09-20

## 2021-08-03 DIAGNOSIS — E78.2 MIXED HYPERLIPIDEMIA: ICD-10-CM

## 2021-08-03 DIAGNOSIS — I10 ESSENTIAL HYPERTENSION: ICD-10-CM

## 2021-08-03 RX ORDER — LOSARTAN POTASSIUM 50 MG/1
TABLET ORAL
Qty: 90 TABLET | Refills: 0 | Status: SHIPPED | OUTPATIENT
Start: 2021-08-03 | End: 2021-10-29

## 2021-08-03 RX ORDER — ATORVASTATIN CALCIUM 10 MG/1
TABLET, FILM COATED ORAL
Qty: 90 TABLET | Refills: 0 | Status: SHIPPED | OUTPATIENT
Start: 2021-08-03 | End: 2021-10-29

## 2021-09-19 DIAGNOSIS — E11.9 DIABETES MELLITUS, NEW ONSET (HCC): ICD-10-CM

## 2021-09-20 RX ORDER — BLOOD SUGAR DIAGNOSTIC
STRIP MISCELLANEOUS
Qty: 100 STRIP | Refills: 1 | Status: SHIPPED | OUTPATIENT
Start: 2021-09-20

## 2021-12-06 ENCOUNTER — OFFICE VISIT (OUTPATIENT)
Dept: SLEEP MEDICINE | Age: 41
End: 2021-12-06
Payer: COMMERCIAL

## 2021-12-06 VITALS
OXYGEN SATURATION: 95 % | HEIGHT: 67 IN | HEART RATE: 125 BPM | SYSTOLIC BLOOD PRESSURE: 158 MMHG | WEIGHT: 315 LBS | DIASTOLIC BLOOD PRESSURE: 96 MMHG | BODY MASS INDEX: 49.44 KG/M2

## 2021-12-06 DIAGNOSIS — G47.33 OSA (OBSTRUCTIVE SLEEP APNEA): ICD-10-CM

## 2021-12-06 DIAGNOSIS — Z86.59 H/O: DEPRESSION: ICD-10-CM

## 2021-12-06 DIAGNOSIS — E66.2 HYPOVENTILATION ASSOCIATED WITH OBESITY SYNDROME (HCC): ICD-10-CM

## 2021-12-06 DIAGNOSIS — G47.33 OSA (OBSTRUCTIVE SLEEP APNEA): Primary | ICD-10-CM

## 2021-12-06 PROCEDURE — 99213 OFFICE O/P EST LOW 20 MIN: CPT | Performed by: INTERNAL MEDICINE

## 2021-12-06 RX ORDER — LISDEXAMFETAMINE DIMESYLATE 30 MG/1
CAPSULE ORAL
COMMUNITY
Start: 2021-11-30 | End: 2022-04-12 | Stop reason: DRUGHIGH

## 2021-12-06 NOTE — PROGRESS NOTES
[address] [facility]. [place], [address]  Tel.  [phone]  Fax. [address] [facility]  [place], [address] [facility]  Tel.  [phone]  Fax. [phone] [address] [facility] [address]  Tel.  [phone]  Fax. [phone]       Anaya Jaime (: 1980) is a 39 y.o. male, established patient, seen for positive airway pressure follow-up and evaluation of the following chief complaint(s):   Sleep Problem ([facility] 2019; Rx follow up)       Anaya Jaime was last seen by me on 01-10-20, prior notes reviewed in detail. Polysomnogram (PSG) performed on  showed an AHI of 37.5/hr.    PSG(2019): Apnea/Hypopnea index of 3.0 which indicates no signifiant sleep apnea on PAP Therapy. MSLT(2019): No significant daytime sleepiness as evidenced by an average latency of 16 minutes, Stage REM was not noted. ASSESSMENT/PLAN:    ICD-10-CM ICD-9-CM    1. LASHAUN (obstructive sleep apnea)  G47.33 327.23 BLOOD GAS, ARTERIAL   2. Hypoventilation associated with obesity syndrome (HCC)  E66.2 278.03 BLOOD GAS, ARTERIAL   3. H/O: depression  Z86.59 V11.8    4. BMI 50.0-59.9, adult ([facility]  Z68.43 V85.43        On ResMed:  AirCurve 10 VAuto:      Settings:  Mode - VAuto    Max IPAP: 15 cmH2O    Min EPAP: 09 cmH2O      He is adherent with PAP therapy and PAP continues to benefit patient and remains necessary for control of his sleep apnea. 1. Sleep Apnea -   * Continue on current pressures    * Supplies for his device were declined by the patient. * [employer] web site reviewed with patient to obtain barriers to prevent irration of his face. 2. Hypoventilation- he was advised to return for blood gas analysis due to elevated bicarbonate levels on metabolic screen.     Orders Placed This Encounter    BLOOD GAS, ARTERIAL     Standing Status:   Future     Standing Expiration Date:   3/6/2022     Scheduling Instructions:      Patient is to call [phone] to schedule testing. * Counseling was provided regarding the importance of regular PAP use with emphasis on ensuring sufficient total sleep time, proper sleep hygiene, and safe driving. * Re-enforced proper and regular cleaning for the device. * He was asked to contact our office for any problems regarding PAP therapy. 3. H/O Depression -  continue on current regimen, he will continue to monitor his symptoms and follow up with his primary care provider for reevaluation/adjustment of medications if warranted. I have reviewed the relationship between mood as it relates to sleep-disordered breathing. 4. Recommended a dedicated weight loss program through appropriate diet and exercise regimen as significant weight reduction has been shown to reduce severity of obstructive sleep apnea. SUBJECTIVE/OBJECTIVE:    He  is seen today for follow up on PAP device and reports no problems using the device. The following concerns identified:    Drowsiness no Problems exhaling no   Snoring no Forget to put on no   Mask Comfortable yes Can't fall asleep no   Dry Mouth no Mask falls off no   Air Leaking no Frequent awakenings no       He admits that his sleep has improved on PAP therapy using FF mask and non-heated tubing. He reports of headgear straps leaving marks on his face. Mask or headgear was not available for review. CO2 21 - 32 mmol/L 29  27  23 R         Review of device download indicated:    Usage Days >= 4 hours 97 % over 30 days  Median Usage  7 hour 34 minutes    Average AHI: 2.6 /hr which reflects improved sleep breathing condition. [place] Sleepiness Score: 4   Modified F.O.S.Q. Score Total / 2: 17.5       Sleep Review of Systems: notable for Negative difficulty falling asleep; Negative awakenings at night; Negative  early morning headaches; Negative  memory problems; Negative  concentration issues;  Negative  chest pain; Negative  shortness of breath;  Negative rashes or itching; Negative heartburn / belching / flatulence; Negative  significant mood issues; NO afternoon naps per week. Early Sleepiness Score: 4   and Modified F.O.S.Q. Score Total / 2: 17.5    Visit Vitals  BP (!) 158/96   Pulse (!) 125   Ht 5' 7\" (1.702 m)   Wt 334 lb (151.5 kg)   SpO2 95%   BMI 52.31 kg/m²           General:   Alert, oriented, not in acute distress, significant skin irritation or discoloration not present   Eyes:  Anicteric Sclerae; intact EOM's   Nose:  No obvious nasal septum deviation    Oropharynx:   Mallampati score 4, thick tongue base, uvula not seen due to low-lying soft palate, narrow tonsilo-pharyngeal pilars, tongue scalloped   Neck:   midline trachea,  no JVD   Chest/Lungs:  symmetrical lung expansion ,clear lung fields on auscultation    CVS:  Normal rate, regular rhythm    Extremities:  No obvious rashes, absent edema    Neuro:  No focal deficits; No obvious tremor    Psych:  Normal eye contact; normal  affect, normal countenance        Rafa Quispe MD, FAASM  Diplomate American Board of Sleep Medicine  Diplomate in Sleep Medicine - ABP    Electronically signed.  12/06/21

## 2021-12-06 NOTE — PATIENT INSTRUCTIONS
[address] [facility] [place]. [place], [address]  Tel.  [phone]  Fax. 100 [facility] 60  [place], [address] [facility]  Tel.  [phone]  Fax. [phone] [address]  Tel.  [phone]  Fax. [phone]     Learning About CPAP for Sleep Apnea  What is CPAP? CPAP is a small machine that you use at home every night while you sleep. It increases air pressure in your throat to keep your airway open. When you have sleep apnea, this can help you sleep better so you feel much better. CPAP stands for \"continuous positive airway pressure. \"  The CPAP machine will have one of the following:  · A mask that covers your nose and mouth  · Prongs that fit into your nose  · A mask that covers your nose only, the most common type. This type is called NCPAP. The N stands for \"nasal.\"  Why is it done? CPAP is usually the best treatment for obstructive sleep apnea. It is the first treatment choice and the most widely used. Your doctor may suggest CPAP if you have:  · Moderate to severe sleep apnea. · Sleep apnea and coronary artery disease (CAD) or heart failure. How does it help? · CPAP can help you have more normal sleep, so you feel less sleepy and more alert during the daytime. · CPAP may help keep heart failure or other heart problems from getting worse. · NCPAP may help lower your blood pressure. · If you use CPAP, your bed partner may also sleep better because you are not snoring or restless. What are the side effects? Some people who use CPAP have:  · A dry or stuffy nose and a sore throat. · Irritated skin on the face. · Sore eyes. · Bloating. If you have any of these problems, work with your doctor to fix them. Here are some things you can try:  · Be sure the mask or nasal prongs fit well. · See if your doctor can adjust the pressure of your CPAP. · If your nose is dry, try a humidifier.   · If your nose is runny or stuffy, try decongestant medicine or a steroid nasal spray. If these things do not help, you might try a different type of machine. Some machines have air pressure that adjusts on its own. Others have air pressures that are different when you breathe in than when you breathe out. This may reduce discomfort caused by too much pressure in your nose. Where can you learn more? Go to Showpitch.be  Enter VisiQuate in the search box to learn more about \"Learning About CPAP for Sleep Apnea. \"   © 4852-2606 Healthwise, Incorporated. Care instructions adapted under license by [employer] (which disclaims liability or warranty for this information). This care instruction is for use with your licensed healthcare professional. If you have questions about a medical condition or this instruction, always ask your healthcare professional. [address] any warranty or liability for your use of this information. Content Version: 4.2.88667; Last Revised: January 11, 2010  PROPER SLEEP HYGIENE    What to avoid  · Do not have drinks with caffeine, such as coffee or black tea, for 8 hours before bed. · Do not smoke or use other types of tobacco near bedtime. Nicotine is a stimulant and can keep you awake. · Avoid drinking alcohol late in the evening, because it can cause you to wake in the middle of the night. · Do not eat a big meal close to bedtime. If you are hungry, eat a light snack. · Do not drink a lot of water close to bedtime, because the need to urinate may wake you up during the night. · Do not read or watch TV in bed. Use the bed only for sleeping and sexual activity. What to try  · Go to bed at the same time every night, and wake up at the same time every morning. Do not take naps during the day. · Keep your bedroom quiet, dark, and cool. · Get regular exercise, but not within 3 to 4 hours of your bedtime. .  · Sleep on a comfortable pillow and mattress.   · If watching the clock makes you anxious, turn it facing away from you so you cannot see the time. · If you worry when you lie down, start a worry book. Well before bedtime, write down your worries, and then set the book and your concerns aside. · Try meditation or other relaxation techniques before you go to bed. · If you cannot fall asleep, get up and go to another room until you feel sleepy. Do something relaxing. Repeat your bedtime routine before you go to bed again. · Make your house quiet and calm about an hour before bedtime. Turn down the lights, turn off the TV, log off the computer, and turn down the volume on music. This can help you relax after a busy day. Drowsy Driving: The [employer] cites drowsiness as a causing factor in more than 377,694 police reported crashes annually, resulting in 76,000 injuries and 1,500 deaths. Other surveys suggest 55% of people polled have driven while drowsy in the past year, 23% had fallen asleep but not crashed, 3% crashed, and 2% had and accident due to drowsy driving. Who is at risk? Young Drivers: One study of drowsy driving accidents states that 55% of the drivers were under 25 years. Of those, 75% were male. Shift Workers and Travelers: People who work overnight or travel across time zones frequently are at higher risk of experiencing Circadian Rhythm Disorders. They are trying to work and function when their body is programed to sleep. Sleep Deprived: Lack of sleep has a serious impact on your ability to pay attention or focus on a task. Consistently getting less than the average of 8 hours your body needs creates partial or cumulative sleep deprivation. Untreated Sleep Disorders: Sleep Apnea, Narcolepsy, R.L.S., and other sleep disorders (untreated) prevent a person from getting enough restful sleep. This leads to excessive daytime sleepiness and increases the risk for drowsy driving accidents by up to 7 times.   Medications / Alcohol: Even over the counter medications can cause drowsiness. Medications that impair a drivers attention should have a warning label. Alcohol naturally makes you sleepy and on its own can cause accidents. Combined with excessive drowsiness its effects are amplified. Signs of Drowsy Driving:   * You don't remember driving the last few miles   * You may drift out of your mariella   * You are unable to focus and your thoughts wander   * You may yawn more often than normal   * You have difficulty keeping your eyes open / nodding off   * Missing traffic signs, speeding, or tailgating  Prevention-   Good sleep hygiene, lifestyle and behavioral choices have the most impact on drowsy driving. There is no substitute for sleep and the average person requires 8 hours nightly. If you find yourself driving drowsy, stop and sleep. Consider the sleep hygiene tips provided during your visit as well. Medication Refill Policy: Refills for all medications require 1 week advance notice. Please have your pharmacy fax a refill request. We are unable to fax, or call in \"controled substance\" medications and you will need to pick these prescriptions up from our office. Event[employer] Activation    Thank you for requesting access to [employer]. Please follow the instructions below to securely access and download your online medical record. EventBuilder allows you to send messages to your doctor, view your test results, renew your prescriptions, schedule appointments, and more. How Do I Sign Up? 1. In your internet browser, go to [email or web address]. 2. Click on the First Time User? Click Here link in the Sign In box. You will see the New Member Sign Up page. 3. Enter your EventBuilder Access Code exactly as it appears below. You will not need to use this code after youve completed the sign-up process. If you do not sign up before the expiration date, you must request a new code. Event[employer] Access Code:  Activation code not generated  Current Offsite Care Resources Status: Active (This is the date your Offsite Care Resources access code will )    4. Enter the last four digits of your Social Security Number (xxxx) and Date of Birth (mm/dd/yyyy) as indicated and click Submit. You will be taken to the next sign-up page. 5. Create a Polyplext ID. This will be your Offsite Care Resources login ID and cannot be changed, so think of one that is secure and easy to remember. 6. Create a Offsite Care Resources password. You can change your password at any time. 7. Enter your Password Reset Question and Answer. This can be used at a later time if you forget your password. 8. Enter your e-mail address. You will receive e-mail notification when new information is available in [address] 9. Click Sign Up. You can now view and download portions of your medical record. 10. Click the Download Summary menu link to download a portable copy of your medical information. Additional Information    If you have questions, please call [phone]. Remember, Offsite Care Resources is NOT to be used for urgent needs. For medical emergencies, dial 911.

## 2021-12-13 ENCOUNTER — HOSPITAL ENCOUNTER (OUTPATIENT)
Dept: PULMONOLOGY | Age: 41
Discharge: HOME | End: 2021-12-13
Attending: INTERNAL MEDICINE
Payer: COMMERCIAL

## 2021-12-13 LAB
ARTERIAL PATENCY WRIST A: POSITIVE
ARTERIAL PATENCY WRIST A: POSITIVE
BASE EXCESS BLD CALC-SCNC: 2.4 MMOL/L
BASE EXCESS BLD CALC-SCNC: 2.4 MMOL/L
BDY SITE: ABNORMAL
BDY SITE: ABNORMAL
GAS FLOW.O2 O2 DELIVERY SYS: ABNORMAL L/MIN
GAS FLOW.O2 O2 DELIVERY SYS: ABNORMAL L/MIN
HCO3 BLD-SCNC: 26.1 MMOL/L (ref 22–26)
HCO3 BLD-SCNC: 26.1 MMOL/L (ref 22–26)
O2/TOTAL GAS SETTING VFR VENT: 21 %
O2/TOTAL GAS SETTING VFR VENT: 21 %
PCO2 BLD: 36.6 MMHG (ref 35–45)
PCO2 BLD: 36.6 MMHG (ref 35–45)
PH BLD: 7.46 [PH] (ref 7.35–7.45)
PH BLD: 7.46 [PH] (ref 7.35–7.45)
PO2 BLD: 84 MMHG (ref 80–100)
PO2 BLD: 84 MMHG (ref 80–100)
SAO2 % BLD: 96.9 % (ref 92–97)
SAO2 % BLD: 96.9 % (ref 92–97)
SPECIMEN TYPE: ABNORMAL
SPECIMEN TYPE: ABNORMAL

## 2021-12-13 PROCEDURE — 82803 BLOOD GASES ANY COMBINATION: CPT

## 2021-12-13 PROCEDURE — 36600 WITHDRAWAL OF ARTERIAL BLOOD: CPT

## 2021-12-25 DIAGNOSIS — E78.2 MIXED HYPERLIPIDEMIA: ICD-10-CM

## 2021-12-25 DIAGNOSIS — I10 ESSENTIAL HYPERTENSION: ICD-10-CM

## 2021-12-26 RX ORDER — LOSARTAN POTASSIUM 50 MG/1
TABLET ORAL
Qty: 15 TABLET | Refills: 0 | Status: SHIPPED | OUTPATIENT
Start: 2021-12-26 | End: 2022-01-04

## 2021-12-26 RX ORDER — ATORVASTATIN CALCIUM 10 MG/1
TABLET, FILM COATED ORAL
Qty: 15 TABLET | Refills: 0 | Status: SHIPPED | OUTPATIENT
Start: 2021-12-26 | End: 2022-01-04

## 2022-01-04 DIAGNOSIS — I10 ESSENTIAL HYPERTENSION: ICD-10-CM

## 2022-01-04 DIAGNOSIS — E78.2 MIXED HYPERLIPIDEMIA: ICD-10-CM

## 2022-01-04 RX ORDER — LOSARTAN POTASSIUM 50 MG/1
TABLET ORAL
Qty: 15 TABLET | Refills: 0 | Status: SHIPPED | OUTPATIENT
Start: 2022-01-04 | End: 2022-01-10

## 2022-01-04 RX ORDER — ATORVASTATIN CALCIUM 10 MG/1
TABLET, FILM COATED ORAL
Qty: 15 TABLET | Refills: 0 | Status: SHIPPED | OUTPATIENT
Start: 2022-01-04 | End: 2022-01-16 | Stop reason: ALTCHOICE

## 2022-01-10 ENCOUNTER — OFFICE VISIT (OUTPATIENT)
Dept: PRIMARY CARE CLINIC | Age: 42
End: 2022-01-10
Payer: COMMERCIAL

## 2022-01-10 VITALS
HEART RATE: 95 BPM | RESPIRATION RATE: 18 BRPM | HEIGHT: 67 IN | DIASTOLIC BLOOD PRESSURE: 92 MMHG | WEIGHT: 315 LBS | OXYGEN SATURATION: 100 % | TEMPERATURE: 97.6 F | SYSTOLIC BLOOD PRESSURE: 146 MMHG | BODY MASS INDEX: 49.44 KG/M2

## 2022-01-10 DIAGNOSIS — F32.89 OTHER DEPRESSION: ICD-10-CM

## 2022-01-10 DIAGNOSIS — E11.8 TYPE II DIABETES MELLITUS WITH COMPLICATION (HCC): Primary | ICD-10-CM

## 2022-01-10 DIAGNOSIS — I10 UNCONTROLLED HYPERTENSION: ICD-10-CM

## 2022-01-10 DIAGNOSIS — G47.33 OSA TREATED WITH BIPAP: ICD-10-CM

## 2022-01-10 DIAGNOSIS — E66.01 OBESITY, MORBID (HCC): ICD-10-CM

## 2022-01-10 PROCEDURE — 99214 OFFICE O/P EST MOD 30 MIN: CPT | Performed by: INTERNAL MEDICINE

## 2022-01-10 RX ORDER — LOSARTAN POTASSIUM AND HYDROCHLOROTHIAZIDE 12.5; 5 MG/1; MG/1
1 TABLET ORAL DAILY
Qty: 30 TABLET | Refills: 2 | Status: SHIPPED | OUTPATIENT
Start: 2022-01-10 | End: 2022-03-04 | Stop reason: ALTCHOICE

## 2022-01-10 NOTE — PROGRESS NOTES
Sally Ogden (: 1980) is a 39 y.o. male, established patient, here for evaluation of the following chief complaint(s):  Diabetes Care Management  (labs) and Hypertension (patient checks at home and readings are in the 160's/90's)     Written by Jean Allen, as dictated by Dr. Griselda Cortez MD.      ASSESSMENT/PLAN:  Below is the assessment and plan developed based on review of pertinent history, physical exam, labs, studies, and medications. 1. Type II diabetes mellitus with complication ([facility].)  Blood work from 21 showed elevated FBG. We will start him on Ozempic 0.25 mg subQ once a week. Potential side effects were discussed. He will continue on metformin 850 mg BID. Continue to monitor BG daily. -      DIABETES FOOT EXAM  -     semaglutide (OZEMPIC) 0.25 mg or 0.5 mg/dose (2 mg/1.5 ml) subq pen; 0.5 mg by SubCUTAneous route every seven (7) days for 90 days. , Normal, Disp-1 Box, R-2 sent to pharmacy. 2. Uncontrolled hypertension  His BP today is elevated as well as with home readings. We will start him on losartan-HCTZ 50-12.5 mg daily. Potential side effects were discussed. I asked him to monitor his BP readings regularly. -     losartan-hydroCHLOROthiazide (HYZAAR) 50-12.5 mg per tablet; Take 1 Tablet by mouth daily for 90 days. , Normal, Disp-30 Tablet, R-2 sent to pharmacy. 3. Obesity, morbid ([facility]  We will start him on Ozempic which will hopefully help with weight loss. Continue on Vyvanse 30 mg daily for binge eating. Explained that he should be walking 5,000 steps daily to maintain conditioning and weight and increase to at least 10,000 steps to work on losing weight. 4. LASHAUN treated with BiPAP  Continue using BiPAP nightly. 5. Other depression  Followed by Psychiatry and Therapy. He will continue on Trintellix 20 mg daily and Abilify 2 mg daily. SUBJECTIVE/OBJECTIVE:  HPI  The patient presents today for a routine follow-up.  He had routine lab work completed on 12/22/21. His BP today is elevated at 148/90, 146/92 on manual repeat. He states his BP this AM was 160/89. He is on losartan 50 mg daily for HTN, but states that he only takes this ~3x/week because he forgets to take this. He has taken his medication this AM around 7:30a. He denies any chest tightness or SOB. His hgb A1c from 12/22/21 was 6.0% with a FBG of 142. He is on metformin 850 mg BID. He notes that his BG readings in the AM are usually elevated. He has been following with a psychiatrist and therapist and is on Trintellix 20 mg daily and Abilify 2 mg daily. He states that his mood has been well controlled on current medications and visits with therapy. He is using a BiPAP for LASHAUN. He is on Vyvanse 30 mg daily for binge eating and notes that this also helps with his concentration. He has had 1 appt with the diabetic educator. He usually eats 2 meals per day and has been using [employer] service. Patient Active Problem List   Diagnosis Code    Hyperlipemia E78.5    Acne vulgaris L70.0    Depression F32. A    Obesity, morbid ([facility] E66.01    Attention and concentration deficit R41.840    LASHAUN treated with BiPAP G47.33        Current Outpatient Medications on File Prior to Visit   Medication Sig Dispense Refill    atorvastatin (LIPITOR) 10 mg tablet TAKE 1 TABLET BY MOUTH EVERY DAY *NEED APPT 15 Tablet 0    metFORMIN (GLUCOPHAGE) 850 mg tablet TAKE 1 TABLET BY MOUTH TWICE A DAY WITH MEALS *NEED APPT 60 Tablet 0    Vyvanse 30 mg capsule       OneTouch Verio test strips strip CHECK BLOOD SUGAR 3 TIMES A  Strip 1    lancets 30 gauge misc Use as directed 100 Lancet 5    ARIPiprazole (ABILIFY) 2 mg tablet       Blood-Glucose Meter monitoring kit Check blood glucose 3 times a day 1 Kit 0    TRINTELLIX 20 mg tablet TK 1 T PO QD      cholecalciferol, vitamin D3, (VITAMIN D3) 2,000 unit tab Take  by mouth.       [DISCONTINUED] losartan (COZAAR) 50 mg tablet TAKE 1 TABLET BY MOUTH EVERY DAY *NEED APPT 15 Tablet 0     No current facility-administered medications on file prior to visit. Allergies   Allergen Reactions    Sulfa (Sulfonamide Antibiotics) Nausea and Vomiting       Past Medical History:   Diagnosis Date    Mixed hyperlipidemia     Obesity     Persistent disorder of initiating or maintaining sleep     Sleep apnea        Past Surgical History:   Procedure Laterality Date    HX ORTHOPAEDIC  '05    rt. shoulder - \"slap\" injury       Family History   Problem Relation Age of Onset    Cancer Father         colon    Asthma Sister        Social History     Socioeconomic History    Marital status: SINGLE     Spouse name: Single    Number of children: 0    Years of education: Not on file    Highest education level: Not on file   Occupational History    Occupation: IT   Tobacco Use    Smoking status: Never Smoker    Smokeless tobacco: Never Used   Substance and Sexual Activity    Alcohol use:  Yes     Alcohol/week: 5.0 standard drinks     Types: 3 Standard drinks or equivalent, 2 Cans of beer per week     Comment: 4x week    Drug use: No    Sexual activity: Yes     Partners: Female   Other Topics Concern    Not on file   Social History Narrative    Not on file       Orders Only on 12/22/2021   Component Date Value Ref Range Status    WBC 12/22/2021 5.9  4.1 - 11.1 K/uL Final    RBC 12/22/2021 5.45  4.10 - 5.70 M/uL Final    HGB 12/22/2021 14.7  12.1 - 17.0 g/dL Final    HCT 12/22/2021 47.7  36.6 - 50.3 % Final    MCV 12/22/2021 87.5  80.0 - 99.0 FL Final    MCH 12/22/2021 27.0  26.0 - 34.0 PG Final    MCHC 12/22/2021 30.8  30.0 - 36.5 g/dL Final    RDW 12/22/2021 14.8* 11.5 - 14.5 % Final    PLATELET 39/18/7471 612  150 - 400 K/uL Final    MPV 12/22/2021 10.1  8.9 - 12.9 FL Final    NRBC 12/22/2021 0.0  0  WBC Final    ABSOLUTE NRBC 12/22/2021 0.00  0.00 - 0.01 K/uL Final    Cholesterol, total 12/22/2021 157  <200 MG/DL Final    Triglyceride 12/22/2021 116  <150 MG/DL Final    Comment: Based on NCEP-ATP III:  Triglycerides <150 mg/dL  is considered normal, 150-199  mg/dL  borderline high,  200-499 mg/dL high and  greater than or equal to 500  mg/dL very high.  HDL Cholesterol 12/22/2021 28  MG/DL Final    Comment: Based on NCEP ATP III, HDL Cholesterol <40 mg/dL is considered low and >60  mg/dL is elevated.  LDL, calculated 12/22/2021 105.8* 0 - 100 MG/DL Final    Comment: Based on the NCEP-ATP: LDL-C concentrations are considered  optimal <100 mg/dL,  near optimal/above Normal 100-129 mg/dL Borderline High: 130-159, High: 160-189  mg/dL Very High: Greater than or equal to 190 mg/dL      VLDL, calculated 12/22/2021 23.2  MG/DL Final    CHOL/HDL Ratio 12/22/2021 5.6* 0.0 - 5.0   Final    Sodium 12/22/2021 141  136 - 145 mmol/L Final    Potassium 12/22/2021 4.2  3.5 - 5.1 mmol/L Final    Chloride 12/22/2021 105  97 - 108 mmol/L Final    CO2 12/22/2021 29  21 - 32 mmol/L Final    Anion gap 12/22/2021 7  5 - 15 mmol/L Final    Glucose 12/22/2021 142* 65 - 100 mg/dL Final    BUN 12/22/2021 8  6 - 20 MG/DL Final    Creatinine 12/22/2021 1.07  0.70 - 1.30 MG/DL Final    BUN/Creatinine ratio 12/22/2021 7* 12 - 20   Final    GFR est AA 12/22/2021 >60  >60 ml/min/1.73m2 Final    GFR est non-AA 12/22/2021 >60  >60 ml/min/1.73m2 Final    Comment: Estimated GFR is calculated using the IDMS-traceable Modification of Diet in  Renal Disease (MDRD) Study equation, reported for both  Americans  (GFRAA) and non- Americans (GFRNA), and normalized to 1.73m2 body  surface area. The physician must decide which value applies to the patient.  Calcium 12/22/2021 9.1  8.5 - 10.1 MG/DL Final    Bilirubin, total 12/22/2021 0.9  0.2 - 1.0 MG/DL Final    ALT (SGPT) 12/22/2021 53  12 - 78 U/L Final    AST (SGOT) 12/22/2021 29  15 - 37 U/L Final    Alk.  phosphatase 12/22/2021 127* 45 - 117 U/L Final    Protein, total 12/22/2021 7.8  6.4 - 8.2 g/dL Final    Albumin 12/22/2021 4.0  3.5 - 5.0 g/dL Final    Globulin 12/22/2021 3.8  2.0 - 4.0 g/dL Final    A-G Ratio 12/22/2021 1.1  1.1 - 2.2   Final    Hemoglobin A1c 12/22/2021 6.0* 4.0 - 5.6 % Final    Comment: NEW METHOD PLEASE NOTE NEW REFERENCE RANGE  (NOTE)  HbA1C Interpretive Ranges  <5.7              Normal  5.7 - 6.4         Consider Prediabetes  >6.5              Consider Diabetes      Est. average glucose 12/22/2021 126  mg/dL Final    Microalbumin,urine random 12/22/2021 12.00  MG/DL Final    No reference range has been established.  Creatinine, urine 12/22/2021 519.00  mg/dL Final    No reference range has been established.     Microalbumin/Creat ratio (mg/g cre* 12/22/2021 23  0 - 30 mg/g Final   Hospital Outpatient Visit on 12/13/2021   Component Date Value Ref Range Status    FIO2 (POC) 12/13/2021 21  % Final    pH (POC) 12/13/2021 7.46* 7.35 - 7.45   Final    pCO2 (POC) 12/13/2021 36.6  35.0 - 45.0 MMHG Final    pO2 (POC) 12/13/2021 84  80 - 100 MMHG Final    HCO3 (POC) 12/13/2021 26.1* 22 - 26 MMOL/L Final    sO2 (POC) 12/13/2021 96.9  92 - 97 % Final    Base excess (POC) 12/13/2021 2.4  mmol/L Final    Site 12/13/2021 LEFT RADIAL    Final    Device: 12/13/2021 ROOM AIR    Final    Allens test (POC) 12/13/2021 Positive    Final    Specimen type (POC) 12/13/2021 ARTERIAL    Final    FIO2 (POC) 12/13/2021 21  % Final    pH (POC) 12/13/2021 7.46* 7.35 - 7.45   Final    pCO2 (POC) 12/13/2021 36.6  35.0 - 45.0 MMHG Final    pO2 (POC) 12/13/2021 84  80 - 100 MMHG Final    HCO3 (POC) 12/13/2021 26.1* 22 - 26 MMOL/L Final    sO2 (POC) 12/13/2021 96.9  92 - 97 % Final    Base excess (POC) 12/13/2021 2.4  mmol/L Final    Site 12/13/2021 LEFT RADIAL    Final    Device: 12/13/2021 ROOM AIR    Final    Allens test (POC) 12/13/2021 Positive    Final    Specimen type (POC) 12/13/2021 ARTERIAL    Final      Review of Systems   Constitutional: Negative for activity change, fatigue and unexpected weight change. HENT: Negative for congestion, ear discharge, ear pain, hearing loss, rhinorrhea and sore throat. Eyes: Negative for pain, discharge and redness. Respiratory: Negative for cough, chest tightness and shortness of breath. Cardiovascular: Negative for leg swelling. Gastrointestinal: Negative for abdominal pain, constipation and diarrhea. Endocrine: Negative for polyuria. Genitourinary: Negative for dysuria, flank pain and urgency. Musculoskeletal: Negative for arthralgias, back pain and myalgias. Skin: Negative for color change. Neurological: Negative for dizziness, light-headedness and headaches. Psychiatric/Behavioral: Negative for dysphoric mood and sleep disturbance. The patient is not nervous/anxious. Visit Vitals  BP (!) 146/92 (BP 1 Location: Left arm, BP Patient Position: Sitting)   Pulse 95   Temp 97.6 °F (36.4 °C) (Temporal)   Resp 18   Ht 5' 7\" (1.702 m)   Wt 330 lb (149.7 kg)   SpO2 100%   BMI 51.69 kg/m²      Physical Exam  Vitals and nursing note reviewed. Constitutional:       General: He is not in acute distress. Appearance: Normal appearance. He is well-developed. He is not diaphoretic. HENT:      Head: Normocephalic and atraumatic. Right Ear: External ear normal.      Left Ear: External ear normal.      Mouth/Throat:      Mouth: Mucous membranes are moist.      Pharynx: Oropharynx is clear. Eyes:      General: No scleral icterus. Right eye: No discharge. Left eye: No discharge. Extraocular Movements: Extraocular movements intact. Conjunctiva/sclera: Conjunctivae normal.      Pupils: Pupils are equal, round, and reactive to light. Cardiovascular:      Rate and Rhythm: Normal rate and regular rhythm. Pulses: Normal pulses. Heart sounds: Normal heart sounds. Pulmonary:      Effort: Pulmonary effort is normal.      Breath sounds: Normal breath sounds. No wheezing.    Musculoskeletal: Right lower leg: No edema. Left lower leg: No edema. Feet:      Comments: Diabetic foot exam:     Left: Vibratory sensation normal    Sharp/dull discrimination normal    Filament test normal sensation with micro filament   Pulse DP: 2+ (normal)   Deformities: None  Right: Vibratory sensation normal   Sharp/dull discrimination normal   Filament test normal sensation with micro filament   Pulse DP: 2+ (normal)   Deformities: None   Neurological:      Mental Status: He is alert and oriented to person, place, and time. Psychiatric:         Mood and Affect: Mood and affect normal.         An electronic signature was used to authenticate this note.   -- Gaudencio Feeling

## 2022-01-10 NOTE — PROGRESS NOTES
Chief Complaint   Patient presents with    Diabetes Care Management      labs    Hypertension     patient checks at home and readings are in the 160's/90's

## 2022-01-13 DIAGNOSIS — E11.9 DIABETES MELLITUS, NEW ONSET (HCC): ICD-10-CM

## 2022-01-13 RX ORDER — METFORMIN HYDROCHLORIDE 850 MG/1
TABLET ORAL
Qty: 60 TABLET | Refills: 0 | Status: SHIPPED | OUTPATIENT
Start: 2022-01-13 | End: 2022-02-11

## 2022-01-16 DIAGNOSIS — E78.2 MIXED HYPERLIPIDEMIA: Primary | ICD-10-CM

## 2022-01-16 RX ORDER — ATORVASTATIN CALCIUM 10 MG/1
10 TABLET, FILM COATED ORAL DAILY
Qty: 90 TABLET | Refills: 0 | Status: SHIPPED | OUTPATIENT
Start: 2022-01-16 | End: 2022-03-04 | Stop reason: SDUPTHER

## 2022-02-11 DIAGNOSIS — E11.9 DIABETES MELLITUS, NEW ONSET (HCC): ICD-10-CM

## 2022-02-11 RX ORDER — METFORMIN HYDROCHLORIDE 850 MG/1
TABLET ORAL
Qty: 60 TABLET | Refills: 0 | Status: SHIPPED | OUTPATIENT
Start: 2022-02-11 | End: 2022-02-25 | Stop reason: SDUPTHER

## 2022-02-25 DIAGNOSIS — E11.9 DIABETES MELLITUS, NEW ONSET (HCC): ICD-10-CM

## 2022-02-28 RX ORDER — METFORMIN HYDROCHLORIDE 850 MG/1
TABLET ORAL
Qty: 60 TABLET | Refills: 0 | Status: SHIPPED | OUTPATIENT
Start: 2022-02-28 | End: 2022-03-04 | Stop reason: ALTCHOICE

## 2022-02-28 NOTE — TELEPHONE ENCOUNTER
Requested Prescriptions     Pending Prescriptions Disp Refills    metFORMIN (GLUCOPHAGE) 850 mg tablet 60 Tablet 0     Sig: TAKE 1 TABLET BY MOUTH TWICE A DAY WITH MEALS *NEED APPT        Last Visit 1/10/22  Last Refill 2/11/22

## 2022-03-04 ENCOUNTER — OFFICE VISIT (OUTPATIENT)
Dept: PRIMARY CARE CLINIC | Age: 42
End: 2022-03-04
Payer: COMMERCIAL

## 2022-03-04 VITALS
BODY MASS INDEX: 49.44 KG/M2 | OXYGEN SATURATION: 100 % | SYSTOLIC BLOOD PRESSURE: 145 MMHG | RESPIRATION RATE: 18 BRPM | HEIGHT: 67 IN | TEMPERATURE: 97.5 F | WEIGHT: 315 LBS | HEART RATE: 93 BPM | DIASTOLIC BLOOD PRESSURE: 92 MMHG

## 2022-03-04 DIAGNOSIS — F33.1 MODERATE EPISODE OF RECURRENT MAJOR DEPRESSIVE DISORDER (HCC): ICD-10-CM

## 2022-03-04 DIAGNOSIS — I10 UNCONTROLLED HYPERTENSION: ICD-10-CM

## 2022-03-04 DIAGNOSIS — G47.33 OSA TREATED WITH BIPAP: ICD-10-CM

## 2022-03-04 DIAGNOSIS — E66.01 MORBIDLY OBESE (HCC): ICD-10-CM

## 2022-03-04 DIAGNOSIS — E11.8 TYPE II DIABETES MELLITUS WITH COMPLICATION (HCC): Primary | ICD-10-CM

## 2022-03-04 DIAGNOSIS — E11.9 DIABETES MELLITUS, NEW ONSET (HCC): ICD-10-CM

## 2022-03-04 DIAGNOSIS — R00.0 TACHYCARDIA: ICD-10-CM

## 2022-03-04 DIAGNOSIS — E78.2 MIXED HYPERLIPIDEMIA: ICD-10-CM

## 2022-03-04 PROCEDURE — 99214 OFFICE O/P EST MOD 30 MIN: CPT | Performed by: INTERNAL MEDICINE

## 2022-03-04 RX ORDER — LOSARTAN POTASSIUM AND HYDROCHLOROTHIAZIDE 12.5; 5 MG/1; MG/1
1 TABLET ORAL DAILY
Qty: 30 TABLET | Refills: 2 | Status: CANCELLED | OUTPATIENT
Start: 2022-03-04 | End: 2022-06-02

## 2022-03-04 RX ORDER — METFORMIN HYDROCHLORIDE 850 MG/1
TABLET ORAL
Qty: 60 TABLET | Refills: 0 | Status: CANCELLED | OUTPATIENT
Start: 2022-03-04

## 2022-03-04 RX ORDER — NEBIVOLOL 5 MG/1
5 TABLET ORAL DAILY
Qty: 90 TABLET | Refills: 0 | Status: SHIPPED | OUTPATIENT
Start: 2022-03-04 | End: 2022-04-12 | Stop reason: ALTCHOICE

## 2022-03-04 RX ORDER — ATORVASTATIN CALCIUM 10 MG/1
10 TABLET, FILM COATED ORAL DAILY
Qty: 90 TABLET | Refills: 0 | Status: SHIPPED | OUTPATIENT
Start: 2022-03-04 | End: 2022-04-17

## 2022-03-04 RX ORDER — LOSARTAN POTASSIUM AND HYDROCHLOROTHIAZIDE 12.5; 1 MG/1; MG/1
1 TABLET ORAL DAILY
Qty: 30 TABLET | Refills: 0 | Status: SHIPPED | OUTPATIENT
Start: 2022-03-04 | End: 2022-04-03

## 2022-03-04 RX ORDER — METFORMIN HYDROCHLORIDE 850 MG/1
850 TABLET ORAL 2 TIMES DAILY WITH MEALS
Qty: 60 TABLET | Refills: 0 | Status: SHIPPED | OUTPATIENT
Start: 2022-03-04 | End: 2022-04-03

## 2022-03-04 RX ORDER — SEMAGLUTIDE 1.34 MG/ML
0.5 INJECTION, SOLUTION SUBCUTANEOUS
Qty: 3 PEN | Refills: 0 | Status: SHIPPED | OUTPATIENT
Start: 2022-03-04 | End: 2022-06-02

## 2022-03-04 NOTE — PROGRESS NOTES
Devin Walker (: 1980) is a 43 y.o. male, established patient, here for evaluation of the following chief complaint(s):  Diabetes Care Management      Written by Idalia Kendall, as dictated by Dr. Kaden Ramos MD.      ASSESSMENT/PLAN:  Below is the assessment and plan developed based on review of pertinent history, physical exam, labs, studies, and medications. 1. Type II diabetes mellitus with complication (HCC)  We will increase his dose of Ozempic from 0.25 mg to 0.5 mg subQ once a week. Continue on metformin 850 mg BID which I refilled today. Continue to check BG regularly at home. Follow-up in April. -     semaglutide (Ozempic) 0.25 mg or 0.5 mg/dose (2 mg/1.5 ml) subq pen; 0.5 mg by SubCUTAneous route every seven (7) days for 90 days. , Normal, Disp-3 Pen, R-0 sent to pharmacy. -     metFORMIN (GLUCOPHAGE) 850 mg tablet; Take 1 Tablet by mouth two (2) times daily (with meals) for 30 days. , Normal, Disp-60 Tablet, R-0 sent to pharmacy. 2. Moderate episode of recurrent major depressive disorder ([facility] [address]  Followed by Psychiatry. Continue on Trintellix 20 mg daily and Abilify 2 mg daily. 3. Uncontrolled hypertension  His BP today is elevated. We will increase his dose of losartan-HCTZ from 50-12.5 mg to 100-12.5 mg daily. Follow up in 3 weeks. -     losartan-hydroCHLOROthiazide (HYZAAR) 100-12.5 mg per tablet; Take 1 Tablet by mouth daily for 30 days. , Normal, Disp-30 Tablet, R-0 sent to pharmacy. 4. LASHAUN treated with BiPAP  Continue using BiPAP nightly. 5. Morbidly obese ([facility] [address]  Explained that he should be walking 5,000 steps daily to maintain conditioning and weight and increase to at least 10,000 steps to work on losing weight. 6. Tachycardia  We will start him on Bystolic 5 mg daily. Potential side effects were discussed. I asked him to check his BP and HR regularly at home and bring in a list of readings to his next appt. -     nebivoloL (BYSTOLIC) 5 mg tablet;  Take 1 Tablet by mouth daily for 90 days. , Normal, Disp-90 Tablet, R-0 sent to pharmacy. SUBJECTIVE/OBJECTIVE:  HPI   The patient presents today for a routine follow-up. His BP today is elevated at 144/90, 145/92 on manual repeat. Home readings have been around 333-556 systolic. He is on losartan-HCTZ 50-12.5 mg daily for HTN. He denies any headache. He has been getting frequent elevated readings for resting heart rate ~120 on his apple watch. His HR is 93 today. He is on metformin 850 mg BID and Ozempic 0.25 mg subQ once a week. He denies any abdominal pain since starting Ozempic. He has been checking his BG at home with good readings. He is followed by Psychiatry for depression. He is on Trintellix 20 mg daily and Abilify 2 mg daily which have been working well. He has been stressed since starting a new job. He has been eating more due to the stress. He uses a BiPAP nightly for LASHAUN. Patient Active Problem List   Diagnosis Code    Hyperlipemia E78.5    Acne vulgaris L70.0    Depression F32. A    Obesity, morbid ([facility] [address] E66.01    Attention and concentration deficit R41.840    LASHAUN treated with BiPAP G47.33        Current Outpatient Medications on File Prior to Visit   Medication Sig Dispense Refill    atorvastatin (LIPITOR) 10 mg tablet Take 1 Tablet by mouth daily for 90 days. 90 Tablet 0    Vyvanse 30 mg capsule       OneTouch Verio test strips strip CHECK BLOOD SUGAR 3 TIMES A  Strip 1    lancets 30 gauge misc Use as directed 100 Lancet 5    ARIPiprazole (ABILIFY) 2 mg tablet       Blood-Glucose Meter monitoring kit Check blood glucose 3 times a day 1 Kit 0    TRINTELLIX 20 mg tablet TK 1 T PO QD      cholecalciferol, vitamin D3, (VITAMIN D3) 2,000 unit tab Take  by mouth.       [DISCONTINUED] metFORMIN (GLUCOPHAGE) 850 mg tablet TAKE 1 TABLET BY MOUTH TWICE A DAY WITH MEALS *NEED APPT 60 Tablet 0    [DISCONTINUED] semaglutide (OZEMPIC) 0.25 mg or 0.5 mg/dose (2 mg/1.5 ml) subq pen 0.5 mg by SubCUTAneous route every seven (7) days for 90 days. 1 Box 2    [DISCONTINUED] losartan-hydroCHLOROthiazide (HYZAAR) 50-12.5 mg per tablet Take 1 Tablet by mouth daily for 90 days. 30 Tablet 2     No current facility-administered medications on file prior to visit. Allergies   Allergen Reactions    Sulfa (Sulfonamide Antibiotics) Nausea and Vomiting       Past Medical History:   Diagnosis Date    Mixed hyperlipidemia     Obesity     Persistent disorder of initiating or maintaining sleep     Sleep apnea        Past Surgical History:   Procedure Laterality Date    HX ORTHOPAEDIC  '05    rt. shoulder - \"slap\" injury       Family History   Problem Relation Age of Onset    Cancer Father         colon    Asthma Sister        Social History     Socioeconomic History    Marital status: SINGLE     Spouse name: Single    Number of children: 0    Years of education: Not on file    Highest education level: Not on file   Occupational History    Occupation: IT   Tobacco Use    Smoking status: Never Smoker    Smokeless tobacco: Never Used   Substance and Sexual Activity    Alcohol use:  Yes     Alcohol/week: 5.0 standard drinks     Types: 3 Standard drinks or equivalent, 2 Cans of beer per week     Comment: 4x week    Drug use: No    Sexual activity: Yes     Partners: Female   Other Topics Concern    Not on file   Social History Narrative    Not on file       Orders Only on 12/22/2021   Component Date Value Ref Range Status    WBC 12/22/2021 5.9  4.1 - 11.1 K/uL Final    RBC 12/22/2021 5.45  4.10 - 5.70 M/uL Final    HGB 12/22/2021 14.7  12.1 - 17.0 g/dL Final    HCT 12/22/2021 47.7  36.6 - 50.3 % Final    MCV 12/22/2021 87.5  80.0 - 99.0 FL Final    MCH 12/22/2021 27.0  26.0 - 34.0 PG Final    MCHC 12/22/2021 30.8  30.0 - 36.5 g/dL Final    RDW 12/22/2021 14.8* 11.5 - 14.5 % Final    PLATELET 88/43/9578 739  150 - 400 K/uL Final    MPV 12/22/2021 10.1  8.9 - 12.9 FL Final    NRBC 12/22/2021 0.0  0  WBC Final    ABSOLUTE NRBC 12/22/2021 0.00  0.00 - 0.01 K/uL Final    Cholesterol, total 12/22/2021 157  <200 MG/DL Final    Triglyceride 12/22/2021 116  <150 MG/DL Final    Comment: Based on NCEP-ATP III:  Triglycerides <150 mg/dL  is considered normal, 150-199  mg/dL  borderline high,  200-499 mg/dL high and  greater than or equal to 500  mg/dL very high.  HDL Cholesterol 12/22/2021 28  MG/DL Final    Comment: Based on NCEP ATP III, HDL Cholesterol <40 mg/dL is considered low and >60  mg/dL is elevated.  LDL, calculated 12/22/2021 105.8* 0 - 100 MG/DL Final    Comment: Based on the NCEP-ATP: LDL-C concentrations are considered  optimal <100 mg/dL,  near optimal/above Normal 100-129 mg/dL Borderline High: 130-159, High: 160-189  mg/dL Very High: Greater than or equal to 190 mg/dL      VLDL, calculated 12/22/2021 23.2  MG/DL Final    CHOL/HDL Ratio 12/22/2021 5.6* 0.0 - 5.0   Final    Sodium 12/22/2021 141  136 - 145 mmol/L Final    Potassium 12/22/2021 4.2  3.5 - 5.1 mmol/L Final    Chloride 12/22/2021 105  97 - 108 mmol/L Final    CO2 12/22/2021 29  21 - 32 mmol/L Final    Anion gap 12/22/2021 7  5 - 15 mmol/L Final    Glucose 12/22/2021 142* 65 - 100 mg/dL Final    BUN 12/22/2021 8  6 - 20 MG/DL Final    Creatinine 12/22/2021 1.07  0.70 - 1.30 MG/DL Final    BUN/Creatinine ratio 12/22/2021 7* 12 - 20   Final    GFR est AA 12/22/2021 >60  >60 ml/min/1.73m2 Final    GFR est non-AA 12/22/2021 >60  >60 ml/min/1.73m2 Final    Comment: Estimated GFR is calculated using the IDMS-traceable Modification of Diet in  Renal Disease (MDRD) Study equation, reported for both  Americans  (GFRAA) and non- Americans (GFRNA), and normalized to 1.73m2 body  surface area. The physician must decide which value applies to the patient.       Calcium 12/22/2021 9.1  8.5 - 10.1 MG/DL Final    Bilirubin, total 12/22/2021 0.9  0.2 - 1.0 MG/DL Final    ALT (SGPT) 12/22/2021 53  12 - 78 U/L Final    AST (SGOT) 12/22/2021 29  15 - 37 U/L Final    Alk. phosphatase 12/22/2021 127* 45 - 117 U/L Final    Protein, total 12/22/2021 7.8  6.4 - 8.2 g/dL Final    Albumin 12/22/2021 4.0  3.5 - 5.0 g/dL Final    Globulin 12/22/2021 3.8  2.0 - 4.0 g/dL Final    A-G Ratio 12/22/2021 1.1  1.1 - 2.2   Final    Hemoglobin A1c 12/22/2021 6.0* 4.0 - 5.6 % Final    Comment: NEW METHOD PLEASE NOTE NEW REFERENCE RANGE  (NOTE)  HbA1C Interpretive Ranges  <5.7              Normal  5.7 - 6.4         Consider Prediabetes  >6.5              Consider Diabetes      Est. average glucose 12/22/2021 126  mg/dL Final    Microalbumin,urine random 12/22/2021 12.00  MG/DL Final    No reference range has been established.  Creatinine, urine 12/22/2021 519.00  mg/dL Final    No reference range has been established.     Microalbumin/Creat ratio (mg/g cre* 12/22/2021 23  0 - 30 mg/g Final   Hospital Outpatient Visit on 12/13/2021   Component Date Value Ref Range Status    FIO2 (POC) 12/13/2021 21  % Final    pH (POC) 12/13/2021 7.46* 7.35 - 7.45   Final    pCO2 (POC) 12/13/2021 36.6  35.0 - 45.0 MMHG Final    pO2 (POC) 12/13/2021 84  80 - 100 MMHG Final    HCO3 (POC) 12/13/2021 26.1* 22 - 26 MMOL/L Final    sO2 (POC) 12/13/2021 96.9  92 - 97 % Final    Base excess (POC) 12/13/2021 2.4  mmol/L Final    Site 12/13/2021 LEFT RADIAL    Final    Device: 12/13/2021 ROOM AIR    Final    Allens test (POC) 12/13/2021 Positive    Final    Specimen type (POC) 12/13/2021 ARTERIAL    Final    FIO2 (POC) 12/13/2021 21  % Final    pH (POC) 12/13/2021 7.46* 7.35 - 7.45   Final    pCO2 (POC) 12/13/2021 36.6  35.0 - 45.0 MMHG Final    pO2 (POC) 12/13/2021 84  80 - 100 MMHG Final    HCO3 (POC) 12/13/2021 26.1* 22 - 26 MMOL/L Final    sO2 (POC) 12/13/2021 96.9  92 - 97 % Final    Base excess (POC) 12/13/2021 2.4  mmol/L Final    Site 12/13/2021 LEFT RADIAL    Final    Device: 12/13/2021 ROOM AIR Final    Allens test (POC) 12/13/2021 Positive    Final    Specimen type (POC) 12/13/2021 ARTERIAL    Final      Review of Systems   Constitutional: Negative for activity change, fatigue and unexpected weight change. HENT: Negative for congestion, ear discharge, ear pain, hearing loss, rhinorrhea and sore throat. Eyes: Negative for pain, discharge and redness. Respiratory: Negative for cough, chest tightness and shortness of breath. Cardiovascular: Positive for palpitations (tachycardia). Negative for leg swelling. Gastrointestinal: Negative for abdominal pain, constipation and diarrhea. Endocrine: Negative for polyuria. Genitourinary: Negative for dysuria, flank pain and urgency. Musculoskeletal: Negative for arthralgias, back pain and myalgias. Skin: Negative for color change. Neurological: Negative for dizziness, light-headedness and headaches. Psychiatric/Behavioral: Negative for dysphoric mood and sleep disturbance. The patient is not nervous/anxious. Visit Vitals  BP (!) 145/92 (BP 1 Location: Left arm, BP Patient Position: Sitting)   Pulse 93   Temp 97.5 °F (36.4 °C) (Temporal)   Resp 18   Ht 5' 7\" (1.702 m)   Wt 327 lb (148.3 kg)   SpO2 100%   BMI 51.22 kg/m²      Physical Exam  Vitals and nursing note reviewed. Constitutional:       General: He is not in acute distress. Appearance: Normal appearance. He is well-developed. He is not diaphoretic. HENT:      Head: Normocephalic and atraumatic. Right Ear: External ear normal.      Left Ear: External ear normal.      Mouth/Throat:      Mouth: Mucous membranes are moist.      Pharynx: Oropharynx is clear. Eyes:      General: No scleral icterus. Right eye: No discharge. Left eye: No discharge. Extraocular Movements: Extraocular movements intact. Conjunctiva/sclera: Conjunctivae normal.      Pupils: Pupils are equal, round, and reactive to light.    Cardiovascular:      Rate and Rhythm: Normal rate and regular rhythm. Pulses: Normal pulses. Heart sounds: Normal heart sounds. Pulmonary:      Effort: Pulmonary effort is normal.      Breath sounds: Normal breath sounds. No wheezing. Musculoskeletal:      Right lower leg: No edema. Left lower leg: No edema. Neurological:      Mental Status: He is alert and oriented to person, place, and time. Psychiatric:         Mood and Affect: Mood and affect normal.         An electronic signature was used to authenticate this note.   -- Anthony Estrada

## 2022-03-18 PROBLEM — R41.840 ATTENTION AND CONCENTRATION DEFICIT: Status: ACTIVE | Noted: 2017-12-11

## 2022-03-19 PROBLEM — G47.33 OSA TREATED WITH BIPAP: Status: ACTIVE | Noted: 2021-01-05

## 2022-03-20 PROBLEM — E66.01 OBESITY, MORBID (HCC): Status: ACTIVE | Noted: 2017-12-11

## 2022-04-12 ENCOUNTER — OFFICE VISIT (OUTPATIENT)
Dept: PRIMARY CARE CLINIC | Age: 42
End: 2022-04-12
Payer: COMMERCIAL

## 2022-04-12 VITALS
SYSTOLIC BLOOD PRESSURE: 142 MMHG | WEIGHT: 315 LBS | HEART RATE: 80 BPM | DIASTOLIC BLOOD PRESSURE: 84 MMHG | OXYGEN SATURATION: 95 % | TEMPERATURE: 98.4 F | HEIGHT: 67 IN | BODY MASS INDEX: 49.44 KG/M2

## 2022-04-12 DIAGNOSIS — E11.8 TYPE II DIABETES MELLITUS WITH COMPLICATION (HCC): ICD-10-CM

## 2022-04-12 DIAGNOSIS — I10 PRIMARY HYPERTENSION: Primary | ICD-10-CM

## 2022-04-12 DIAGNOSIS — F32.89 OTHER DEPRESSION: ICD-10-CM

## 2022-04-12 PROBLEM — E11.9 TYPE 2 DIABETES MELLITUS (HCC): Status: ACTIVE | Noted: 2022-04-12

## 2022-04-12 PROCEDURE — 99214 OFFICE O/P EST MOD 30 MIN: CPT | Performed by: INTERNAL MEDICINE

## 2022-04-12 RX ORDER — LOSARTAN POTASSIUM AND HYDROCHLOROTHIAZIDE 25; 100 MG/1; MG/1
1 TABLET ORAL DAILY
Qty: 90 TABLET | Refills: 0 | Status: SHIPPED | OUTPATIENT
Start: 2022-04-12 | End: 2022-07-11

## 2022-04-12 RX ORDER — ARIPIPRAZOLE 5 MG/1
5 TABLET ORAL DAILY
Qty: 30 TABLET | Refills: 0 | Status: SHIPPED | OUTPATIENT
Start: 2022-04-12 | End: 2022-05-29

## 2022-04-12 RX ORDER — LISDEXAMFETAMINE DIMESYLATE 50 MG/1
CAPSULE ORAL
COMMUNITY
Start: 2022-03-02

## 2022-04-12 RX ORDER — EMPAGLIFLOZIN, METFORMIN HYDROCHLORIDE 12.5; 1 MG/1; MG/1
1 TABLET, EXTENDED RELEASE ORAL DAILY
Qty: 90 EACH | Refills: 0 | Status: SHIPPED | OUTPATIENT
Start: 2022-04-12 | End: 2022-07-26

## 2022-04-12 RX ORDER — NEBIVOLOL 10 MG/1
10 TABLET ORAL DAILY
Qty: 90 TABLET | Refills: 0 | Status: SHIPPED | OUTPATIENT
Start: 2022-04-12 | End: 2022-07-11

## 2022-04-12 NOTE — PROGRESS NOTES
Rowena Velasco (: 1980) is a 43 y.o. male, established patient, here for evaluation of the following chief complaint(s):  Follow-up, Diabetes, Hypertension, and Depression     Written by Jacinta Wyman, as dictated by Dr. Sayda Narayanan MD.      ASSESSMENT/PLAN:  Below is the assessment and plan developed based on review of pertinent history, physical exam, labs, studies, and medications. 1. Primary hypertension  His BP today is elevated so we will increase his dose of Bystolic to 10 mg daily. Continue on losartan-HCTZ 100-25 mg daily which I refilled today. I asked him to check his BP regularly at home and bring in a list of readings to his next appt. -     nebivoloL (BYSTOLIC) 10 mg tablet; Take 1 Tablet by mouth daily for 90 days. , Normal, Disp-90 Tablet, R-0 sent to pharmacy. -     losartan-hydroCHLOROthiazide (HYZAAR) 100-25 mg per tablet; Take 1 Tablet by mouth daily for 90 days. , Normal, Disp-90 Tablet, R-0 sent to pharmacy. 2. Type II diabetes mellitus with complication (HCC)  Recheck hgb A1c today. We will switch him from metformin to Synjardy-XR 12.5-1,000 mg daily. Potential side effects were discussed. Continue on Ozempic 0.5 mg subQ once a week. -     AMB POC HEMOGLOBIN A1C  -     empagliflozin-metformin (Synjardy XR) 12.5-1,000 mg TBph; Take 1 Tablet by mouth daily for 90 days. , Normal, Disp-90 Each, R-0 sent to pharmacy. 3. Other depression  I told him to call his psychiatrist to get an earlier follow-up to discuss his worsening depression. Also recommended going for more therapy sessions. In the meantime, will increase his Abilify to 5 mg daily. Continue on Trintellix 20 mg daily.    -     ARIPiprazole (ABILIFY) 5 mg tablet; Take 1 Tablet by mouth daily for 30 days. , Normal, Disp-30 Tablet, R-0 sent to pharmacy. SUBJECTIVE/OBJECTIVE:  HPI   The patient presents today for a routine follow-up on chronic conditions.  His BP today is elevated at 153/98, 142/84 on manual repeat. He is on Bystolic 5 mg daily and losartan-HCTZ 100-25 mg daily for HTN. He is followed by Psychiatry for depression and last had a follow-up last month. No medications were changed at that time. 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 He is on Trintellix 20 mg daily and Abilify 2 mg daily. He noticed a significant improvement in depression when he first started Abilify, but has been feeling more stressed and depressed recently. He talks to the therapist at his psychiatry office, but they only recommended he increase activity. He does not think time off work will help since the work will just start to pile up. He is on Ozempic 0.5 mg subQ once a week and metformin 850 mg BID for diabetes. He feels a little nauseous in the morning after taking his Ozempic. He did not notice a difference in appetite after starting Ozempic. Patient Active Problem List   Diagnosis Code    Hyperlipemia E78.5    Acne vulgaris L70.0    Depression F32. A    Obesity, morbid ([facility] [address] E66.01    Attention and concentration deficit R41.840    LASHAUN treated with BiPAP G47.33    Type 2 diabetes mellitus E11.9        Current Outpatient Medications on File Prior to Visit   Medication Sig Dispense Refill    Vyvanse 50 mg cap TAKE 1 CAPSULE BY MOUTH EVERY DAY IN THE MORNING      atorvastatin (LIPITOR) 10 mg tablet Take 1 Tablet by mouth daily for 90 days. 90 Tablet 0    semaglutide (Ozempic) 0.25 mg or 0.5 mg/dose (2 mg/1.5 ml) subq pen 0.5 mg by SubCUTAneous route every seven (7) days for 90 days. 3 Pen 0    OneTouch Verio test strips strip CHECK BLOOD SUGAR 3 TIMES A  Strip 1    lancets 30 gauge misc Use as directed 100 Lancet 5    Blood-Glucose Meter monitoring kit Check blood glucose 3 times a day 1 Kit 0    TRINTELLIX 20 mg tablet TK 1 T PO QD      cholecalciferol, vitamin D3, (VITAMIN D3) 2,000 unit tab Take  by mouth.       [DISCONTINUED] nebivoloL (BYSTOLIC) 5 mg tablet Take 1 Tablet by mouth daily for 90 days. 90 Tablet 0    [DISCONTINUED] Vyvanse 30 mg capsule       [DISCONTINUED] ARIPiprazole (ABILIFY) 2 mg tablet        No current facility-administered medications on file prior to visit. Allergies   Allergen Reactions    Sulfa (Sulfonamide Antibiotics) Nausea and Vomiting       Past Medical History:   Diagnosis Date    Mixed hyperlipidemia     Obesity     Persistent disorder of initiating or maintaining sleep     Sleep apnea        Past Surgical History:   Procedure Laterality Date    HX ORTHOPAEDIC  '05    rt. shoulder - \"slap\" injury       Family History   Problem Relation Age of Onset    Cancer Father         colon    Asthma Sister        Social History     Socioeconomic History    Marital status: SINGLE     Spouse name: Single    Number of children: 0    Years of education: Not on file    Highest education level: Not on file   Occupational History    Occupation: IT   Tobacco Use    Smoking status: Never Smoker    Smokeless tobacco: Never Used   Substance and Sexual Activity    Alcohol use: Yes     Alcohol/week: 5.0 standard drinks     Types: 3 Standard drinks or equivalent, 2 Cans of beer per week     Comment: 4x week    Drug use: No    Sexual activity: Yes     Partners: Female   Other Topics Concern    Not on file   Social History Narrative    Not on file       No visits with results within 3 Month(s) from this visit.    Latest known visit with results is:   Orders Only on 12/22/2021   Component Date Value Ref Range Status    WBC 12/22/2021 5.9  4.1 - 11.1 K/uL Final    RBC 12/22/2021 5.45  4.10 - 5.70 M/uL Final    HGB 12/22/2021 14.7  12.1 - 17.0 g/dL Final    HCT 12/22/2021 47.7  36.6 - 50.3 % Final    MCV 12/22/2021 87.5  80.0 - 99.0 FL Final    MCH 12/22/2021 27.0  26.0 - 34.0 PG Final    MCHC 12/22/2021 30.8  30.0 - 36.5 g/dL Final    RDW 12/22/2021 14.8* 11.5 - 14.5 % Final    PLATELET 46/32/7052 380  150 - 400 K/uL Final    MPV 12/22/2021 10.1  8.9 - 12.9 FL Final    NRBC 12/22/2021 0.0  0  WBC Final    ABSOLUTE NRBC 12/22/2021 0.00  0.00 - 0.01 K/uL Final    Cholesterol, total 12/22/2021 157  <200 MG/DL Final    Triglyceride 12/22/2021 116  <150 MG/DL Final    Comment: Based on NCEP-ATP III:  Triglycerides <150 mg/dL  is considered normal, 150-199  mg/dL  borderline high,  200-499 mg/dL high and  greater than or equal to 500  mg/dL very high.  HDL Cholesterol 12/22/2021 28  MG/DL Final    Comment: Based on NCEP ATP III, HDL Cholesterol <40 mg/dL is considered low and >60  mg/dL is elevated.  LDL, calculated 12/22/2021 105.8* 0 - 100 MG/DL Final    Comment: Based on the NCEP-ATP: LDL-C concentrations are considered  optimal <100 mg/dL,  near optimal/above Normal 100-129 mg/dL Borderline High: 130-159, High: 160-189  mg/dL Very High: Greater than or equal to 190 mg/dL      VLDL, calculated 12/22/2021 23.2  MG/DL Final    CHOL/HDL Ratio 12/22/2021 5.6* 0.0 - 5.0   Final    Sodium 12/22/2021 141  136 - 145 mmol/L Final    Potassium 12/22/2021 4.2  3.5 - 5.1 mmol/L Final    Chloride 12/22/2021 105  97 - 108 mmol/L Final    CO2 12/22/2021 29  21 - 32 mmol/L Final    Anion gap 12/22/2021 7  5 - 15 mmol/L Final    Glucose 12/22/2021 142* 65 - 100 mg/dL Final    BUN 12/22/2021 8  6 - 20 MG/DL Final    Creatinine 12/22/2021 1.07  0.70 - 1.30 MG/DL Final    BUN/Creatinine ratio 12/22/2021 7* 12 - 20   Final    GFR est AA 12/22/2021 >60  >60 ml/min/1.73m2 Final    GFR est non-AA 12/22/2021 >60  >60 ml/min/1.73m2 Final    Comment: Estimated GFR is calculated using the IDMS-traceable Modification of Diet in  Renal Disease (MDRD) Study equation, reported for both  Americans  (GFRAA) and non- Americans (GFRNA), and normalized to 1.73m2 body  surface area. The physician must decide which value applies to the patient.       Calcium 12/22/2021 9.1  8.5 - 10.1 MG/DL Final    Bilirubin, total 12/22/2021 0.9  0.2 - 1.0 MG/DL Final    ALT (SGPT) 12/22/2021 53  12 - 78 U/L Final    AST (SGOT) 12/22/2021 29  15 - 37 U/L Final    Alk. phosphatase 12/22/2021 127* 45 - 117 U/L Final    Protein, total 12/22/2021 7.8  6.4 - 8.2 g/dL Final    Albumin 12/22/2021 4.0  3.5 - 5.0 g/dL Final    Globulin 12/22/2021 3.8  2.0 - 4.0 g/dL Final    A-G Ratio 12/22/2021 1.1  1.1 - 2.2   Final    Hemoglobin A1c 12/22/2021 6.0* 4.0 - 5.6 % Final    Comment: NEW METHOD PLEASE NOTE NEW REFERENCE RANGE  (NOTE)  HbA1C Interpretive Ranges  <5.7              Normal  5.7 - 6.4         Consider Prediabetes  >6.5              Consider Diabetes      Est. average glucose 12/22/2021 126  mg/dL Final    Microalbumin,urine random 12/22/2021 12.00  MG/DL Final    No reference range has been established.  Creatinine, urine 12/22/2021 519.00  mg/dL Final    No reference range has been established.  Microalbumin/Creat ratio (mg/g cre* 12/22/2021 23  0 - 30 mg/g Final      Review of Systems   Constitutional: Negative for activity change, fatigue and unexpected weight change. HENT: Negative for congestion, ear discharge, ear pain, hearing loss, rhinorrhea and sore throat. Eyes: Negative for pain, discharge and redness. Respiratory: Negative for cough, chest tightness and shortness of breath. Cardiovascular: Negative for leg swelling. Gastrointestinal: Negative for abdominal pain, constipation and diarrhea. Endocrine: Negative for polyuria. Genitourinary: Negative for dysuria, flank pain and urgency. Musculoskeletal: Negative for arthralgias, back pain and myalgias. Skin: Negative for color change. Neurological: Negative for dizziness, light-headedness and headaches. Psychiatric/Behavioral: Positive for dysphoric mood. Negative for sleep disturbance. The patient is not nervous/anxious.       Visit Vitals  BP (!) 142/84 (BP 1 Location: Right arm, BP Patient Position: Sitting)   Pulse 80   Temp 98.4 °F (36.9 °C) (Temporal)   Ht 5' 7\" (1.702 m)   Wt 328 lb (148.8 kg)   SpO2 95%   BMI 51.37 kg/m²      Physical Exam  Vitals and nursing note reviewed. Constitutional:       General: He is not in acute distress. Appearance: Normal appearance. He is well-developed. He is not diaphoretic. HENT:      Head: Normocephalic and atraumatic. Right Ear: External ear normal.      Left Ear: External ear normal.      Mouth/Throat:      Mouth: Mucous membranes are moist.      Pharynx: Oropharynx is clear. Eyes:      General: No scleral icterus. Right eye: No discharge. Left eye: No discharge. Extraocular Movements: Extraocular movements intact. Conjunctiva/sclera: Conjunctivae normal.      Pupils: Pupils are equal, round, and reactive to light. Cardiovascular:      Rate and Rhythm: Normal rate and regular rhythm. Pulses: Normal pulses. Heart sounds: Normal heart sounds. Pulmonary:      Effort: Pulmonary effort is normal.      Breath sounds: Normal breath sounds. No wheezing. Musculoskeletal:      Right lower leg: No edema. Left lower leg: No edema. Neurological:      Mental Status: He is alert and oriented to person, place, and time. Psychiatric:         Mood and Affect: Mood and affect normal.       An electronic signature was used to authenticate this note.   -- Richar Villa

## 2022-04-12 NOTE — PROGRESS NOTES
Identified pt with two pt identifiers. Reviewed record in preparation for visit and have obtained necessary documentation. All patient medications has been reviewed. Chief Complaint   Patient presents with    Follow-up    Diabetes    Hypertension    Depression     Additional information about chief complaint:    Visit Vitals  BP (!) 153/98 (BP 1 Location: Left upper arm, BP Patient Position: Sitting, BP Cuff Size: Large adult)   Pulse 80   Temp 98.4 °F (36.9 °C) (Temporal)   Ht 5' 7\" (1.702 m)   Wt 328 lb (148.8 kg)   SpO2 95%   BMI 51.37 kg/m²       Health Maintenance Due   Topic    Pneumococcal 0-64 years (1 - PCV)    DTaP/Tdap/Td series (3 - Td or Tdap)       1. Have you been to the ER, urgent care clinic since your last visit? Hospitalized since your last visit? No    2. Have you seen or consulted any other health care providers outside of the [address] [place] [address] since your last visit? Include any pap smears or colon screening.  No

## 2022-04-17 DIAGNOSIS — E78.2 MIXED HYPERLIPIDEMIA: ICD-10-CM

## 2022-04-17 RX ORDER — ATORVASTATIN CALCIUM 10 MG/1
TABLET, FILM COATED ORAL
Qty: 90 TABLET | Refills: 0 | Status: SHIPPED | OUTPATIENT
Start: 2022-04-17 | End: 2022-08-09

## 2022-05-28 DIAGNOSIS — F32.89 OTHER DEPRESSION: ICD-10-CM

## 2022-05-29 RX ORDER — ARIPIPRAZOLE 5 MG/1
TABLET ORAL
Qty: 30 TABLET | Refills: 0 | Status: SHIPPED | OUTPATIENT
Start: 2022-05-29

## 2022-08-09 DIAGNOSIS — E78.2 MIXED HYPERLIPIDEMIA: ICD-10-CM

## 2022-08-09 RX ORDER — ATORVASTATIN CALCIUM 10 MG/1
TABLET, FILM COATED ORAL
Qty: 90 TABLET | Refills: 0 | Status: SHIPPED | OUTPATIENT
Start: 2022-08-09

## 2023-05-22 DIAGNOSIS — E11.8 TYPE 2 DIABETES MELLITUS WITH UNSPECIFIED COMPLICATIONS (HCC): ICD-10-CM

## 2023-05-22 RX ORDER — EMPAGLIFLOZIN, METFORMIN HYDROCHLORIDE 12.5; 1 MG/1; MG/1
TABLET, EXTENDED RELEASE ORAL
Qty: 15 TABLET | Refills: 0 | Status: SHIPPED | OUTPATIENT
Start: 2023-05-22

## 2023-07-17 ENCOUNTER — TELEPHONE (OUTPATIENT)
Dept: PRIMARY CARE CLINIC | Facility: CLINIC | Age: 43
End: 2023-07-17

## 2023-07-19 ENCOUNTER — TELEPHONE (OUTPATIENT)
Dept: PRIMARY CARE CLINIC | Facility: CLINIC | Age: 43
End: 2023-07-19

## 2023-07-19 NOTE — TELEPHONE ENCOUNTER
PA denied for Ozempic. Last A1C and labs were in 2021. Needs A1C 6.5 or higher. Fasting sugar 126 or higher.

## 2023-08-14 ENCOUNTER — OFFICE VISIT (OUTPATIENT)
Dept: PRIMARY CARE CLINIC | Facility: CLINIC | Age: 43
End: 2023-08-14
Payer: COMMERCIAL

## 2023-08-14 VITALS
WEIGHT: 303.6 LBS | HEART RATE: 111 BPM | BODY MASS INDEX: 47.65 KG/M2 | HEIGHT: 67 IN | TEMPERATURE: 98.4 F | OXYGEN SATURATION: 97 % | DIASTOLIC BLOOD PRESSURE: 92 MMHG | SYSTOLIC BLOOD PRESSURE: 142 MMHG | RESPIRATION RATE: 17 BRPM

## 2023-08-14 DIAGNOSIS — G47.33 OSA TREATED WITH BIPAP: ICD-10-CM

## 2023-08-14 DIAGNOSIS — I15.2 HYPERTENSION ASSOCIATED WITH DIABETES (HCC): ICD-10-CM

## 2023-08-14 DIAGNOSIS — Z11.4 ENCOUNTER FOR SCREENING FOR HIV: ICD-10-CM

## 2023-08-14 DIAGNOSIS — E11.8 DIABETES MELLITUS WITH COMPLICATION (HCC): Primary | ICD-10-CM

## 2023-08-14 DIAGNOSIS — E11.8 DIABETES MELLITUS WITH COMPLICATION (HCC): ICD-10-CM

## 2023-08-14 DIAGNOSIS — E11.59 HYPERTENSION ASSOCIATED WITH DIABETES (HCC): ICD-10-CM

## 2023-08-14 DIAGNOSIS — R00.0 TACHYCARDIA: ICD-10-CM

## 2023-08-14 LAB
ALBUMIN SERPL-MCNC: 4 G/DL (ref 3.5–5)
ALBUMIN/GLOB SERPL: 1 (ref 1.1–2.2)
ALP SERPL-CCNC: 111 U/L (ref 45–117)
ALT SERPL-CCNC: 32 U/L (ref 12–78)
ANION GAP SERPL CALC-SCNC: 7 MMOL/L (ref 5–15)
AST SERPL-CCNC: 26 U/L (ref 15–37)
BILIRUB SERPL-MCNC: 0.8 MG/DL (ref 0.2–1)
BUN SERPL-MCNC: 11 MG/DL (ref 6–20)
BUN/CREAT SERPL: 10 (ref 12–20)
CALCIUM SERPL-MCNC: 10 MG/DL (ref 8.5–10.1)
CHLORIDE SERPL-SCNC: 107 MMOL/L (ref 97–108)
CHOLEST SERPL-MCNC: 298 MG/DL
CO2 SERPL-SCNC: 27 MMOL/L (ref 21–32)
CREAT SERPL-MCNC: 1.13 MG/DL (ref 0.7–1.3)
CREAT UR-MCNC: 173 MG/DL
ERYTHROCYTE [DISTWIDTH] IN BLOOD BY AUTOMATED COUNT: 15.5 % (ref 11.5–14.5)
EST. AVERAGE GLUCOSE BLD GHB EST-MCNC: 120 MG/DL
GLOBULIN SER CALC-MCNC: 4.2 G/DL (ref 2–4)
GLUCOSE SERPL-MCNC: 100 MG/DL (ref 65–100)
HBA1C MFR BLD: 5.8 % (ref 4–5.6)
HCT VFR BLD AUTO: 52.2 % (ref 36.6–50.3)
HDLC SERPL-MCNC: 32 MG/DL
HDLC SERPL: 9.3 (ref 0–5)
HGB BLD-MCNC: 16.2 G/DL (ref 12.1–17)
LDLC SERPL CALC-MCNC: 215.2 MG/DL (ref 0–100)
MCH RBC QN AUTO: 27 PG (ref 26–34)
MCHC RBC AUTO-ENTMCNC: 31 G/DL (ref 30–36.5)
MCV RBC AUTO: 86.9 FL (ref 80–99)
MICROALBUMIN UR-MCNC: 1.89 MG/DL
MICROALBUMIN/CREAT UR-RTO: 11 MG/G (ref 0–30)
NRBC # BLD: 0 K/UL (ref 0–0.01)
NRBC BLD-RTO: 0 PER 100 WBC
PLATELET # BLD AUTO: 406 K/UL (ref 150–400)
PMV BLD AUTO: 10.2 FL (ref 8.9–12.9)
POTASSIUM SERPL-SCNC: 3.9 MMOL/L (ref 3.5–5.1)
PROT SERPL-MCNC: 8.2 G/DL (ref 6.4–8.2)
RBC # BLD AUTO: 6.01 M/UL (ref 4.1–5.7)
SODIUM SERPL-SCNC: 141 MMOL/L (ref 136–145)
TRIGL SERPL-MCNC: 254 MG/DL
VLDLC SERPL CALC-MCNC: 50.8 MG/DL
WBC # BLD AUTO: 9.6 K/UL (ref 4.1–11.1)

## 2023-08-14 PROCEDURE — 3080F DIAST BP >= 90 MM HG: CPT | Performed by: INTERNAL MEDICINE

## 2023-08-14 PROCEDURE — 3077F SYST BP >= 140 MM HG: CPT | Performed by: INTERNAL MEDICINE

## 2023-08-14 PROCEDURE — 99214 OFFICE O/P EST MOD 30 MIN: CPT | Performed by: INTERNAL MEDICINE

## 2023-08-14 RX ORDER — SEMAGLUTIDE 2.68 MG/ML
2 INJECTION, SOLUTION SUBCUTANEOUS
Qty: 3 ML | Refills: 1 | Status: SHIPPED | OUTPATIENT
Start: 2023-08-14 | End: 2023-11-12

## 2023-08-14 RX ORDER — NEBIVOLOL 5 MG/1
5 TABLET ORAL DAILY
Qty: 90 TABLET | Refills: 1 | Status: SHIPPED | OUTPATIENT
Start: 2023-08-14 | End: 2024-02-10

## 2023-08-14 RX ORDER — LOSARTAN POTASSIUM AND HYDROCHLOROTHIAZIDE 12.5; 5 MG/1; MG/1
1 TABLET ORAL DAILY
Qty: 30 TABLET | Refills: 5 | Status: SHIPPED | OUTPATIENT
Start: 2023-08-14

## 2023-08-14 SDOH — ECONOMIC STABILITY: FOOD INSECURITY: WITHIN THE PAST 12 MONTHS, YOU WORRIED THAT YOUR FOOD WOULD RUN OUT BEFORE YOU GOT MONEY TO BUY MORE.: NEVER TRUE

## 2023-08-14 SDOH — ECONOMIC STABILITY: FOOD INSECURITY: WITHIN THE PAST 12 MONTHS, THE FOOD YOU BOUGHT JUST DIDN'T LAST AND YOU DIDN'T HAVE MONEY TO GET MORE.: NEVER TRUE

## 2023-08-14 SDOH — ECONOMIC STABILITY: HOUSING INSECURITY
IN THE LAST 12 MONTHS, WAS THERE A TIME WHEN YOU DID NOT HAVE A STEADY PLACE TO SLEEP OR SLEPT IN A SHELTER (INCLUDING NOW)?: NO

## 2023-08-14 SDOH — ECONOMIC STABILITY: INCOME INSECURITY: HOW HARD IS IT FOR YOU TO PAY FOR THE VERY BASICS LIKE FOOD, HOUSING, MEDICAL CARE, AND HEATING?: NOT VERY HARD

## 2023-08-14 ASSESSMENT — ENCOUNTER SYMPTOMS
COLOR CHANGE: 0
CONSTIPATION: 0
SHORTNESS OF BREATH: 0
DIARRHEA: 0
RHINORRHEA: 0
EYE DISCHARGE: 0
CHEST TIGHTNESS: 0
ABDOMINAL PAIN: 0
SORE THROAT: 0
BACK PAIN: 0
COUGH: 0

## 2023-08-14 ASSESSMENT — PATIENT HEALTH QUESTIONNAIRE - PHQ9
SUM OF ALL RESPONSES TO PHQ QUESTIONS 1-9: 0
2. FEELING DOWN, DEPRESSED OR HOPELESS: 0
SUM OF ALL RESPONSES TO PHQ9 QUESTIONS 1 & 2: 0
1. LITTLE INTEREST OR PLEASURE IN DOING THINGS: 0
SUM OF ALL RESPONSES TO PHQ QUESTIONS 1-9: 0

## 2023-08-14 NOTE — PROGRESS NOTES
Conrad Curry (: 1980) is a 37 y.o. male, established patient, here for evaluation of the following chief complaint(s):  Follow-up (Diabetes- 130)    ASSESSMENT/PLAN:  Below is the assessment and plan developed based on review of pertinent history, physical exam, labs, studies, and medications. 1. Diabetes mellitus with complication (HCC)  -     CBC; Future  -     Comprehensive Metabolic Panel; Future  -     Lipid Panel; Future  -     Microalbumin / Creatinine Urine Ratio; Future  -     Hemoglobin A1C; Future     HM DIABETES FOOT EXAM I performed a diabetic foot exam.  -     Semaglutide, 2 MG/DOSE, (OZEMPIC, 2 MG/DOSE,) 8 MG/3ML SOPN; Inject 2 mg into the skin every 7 days, Disp-3 mL, R-1Normal sent to pharmacy.   -     Empagliflozin-metFORMIN HCl ER 12.5-1000 MG TB24; 1 tablet daily, Disp-90 tablet, R-0Normal sent to pharmacy. I ordered a CBC, a CMP, a lipid panel, a urinalysis to check his microalbumin/creatinine urine ratio, and blood work to check his hemoglobin A1C level. I refilled his Ozempic 2 mg to continue injecting weekly and his Synjardy XR 12.5-1000 mg to continue taking daily. 2. Hypertension associated with diabetes ([address])  -      -     losartan-hydroCHLOROthiazide (HYZAAR) 50-12.5 MG per tablet; Take 1 tablet by mouth daily, Disp-30 tablet, R-5Normal sent to pharmacy. I prescribed losartan-HCTZ 50-12.5 mg to start taking daily. Potential side effects were discussed. 3. KEVIN treated with BiPAP  Well-controlled on BiPAP. 4. Encounter for screening for HIV  -     HIV 1/2 Ag/Ab, 4TH Generation,W Rflx Confirm; Future  I ordered an HIV screening test.    5. Tachycardia  -     nebivolol (BYSTOLIC) 5 MG tablet; Take 1 tablet by mouth daily, Disp-90 tablet, R-1Normal sent to pharmacy. I prescribed Bystolic 5 mg to start taking daily. Potential side effects were discussed. SUBJECTIVE/OBJECTIVE:  HPI  The patient presents today for follow up on chronic conditions.  He is fasting

## 2023-08-15 DIAGNOSIS — E78.2 MIXED HYPERLIPIDEMIA: Primary | ICD-10-CM

## 2023-08-15 LAB
HIV 1+2 AB+HIV1 P24 AG SERPL QL IA: NONREACTIVE
HIV 1/2 RESULT COMMENT: NORMAL

## 2023-08-15 RX ORDER — ATORVASTATIN CALCIUM 10 MG/1
10 TABLET, FILM COATED ORAL DAILY
Qty: 90 TABLET | Refills: 1 | Status: SHIPPED | OUTPATIENT
Start: 2023-08-15 | End: 2024-02-11

## 2023-08-22 ENCOUNTER — TELEPHONE (OUTPATIENT)
Dept: PRIMARY CARE CLINIC | Facility: CLINIC | Age: 43
End: 2023-08-22

## 2023-08-24 ENCOUNTER — TELEPHONE (OUTPATIENT)
Dept: PRIMARY CARE CLINIC | Facility: CLINIC | Age: 43
End: 2023-08-24

## 2023-08-24 DIAGNOSIS — E11.9 TYPE 2 DIABETES MELLITUS WITHOUT COMPLICATION, WITHOUT LONG-TERM CURRENT USE OF INSULIN (HCC): Primary | ICD-10-CM

## 2023-08-29 ENCOUNTER — TELEPHONE (OUTPATIENT)
Dept: PRIMARY CARE CLINIC | Facility: CLINIC | Age: 43
End: 2023-08-29

## 2023-09-01 DIAGNOSIS — E11.9 TYPE 2 DIABETES MELLITUS WITHOUT COMPLICATION, WITHOUT LONG-TERM CURRENT USE OF INSULIN (HCC): Primary | ICD-10-CM

## 2023-09-01 RX ORDER — LIRAGLUTIDE 6 MG/ML
1.2 INJECTION SUBCUTANEOUS DAILY
Qty: 2 ADJUSTABLE DOSE PRE-FILLED PEN SYRINGE | Refills: 0 | Status: SHIPPED | OUTPATIENT
Start: 2023-09-01 | End: 2023-10-01

## 2023-09-05 ENCOUNTER — TELEPHONE (OUTPATIENT)
Dept: PRIMARY CARE CLINIC | Facility: CLINIC | Age: 43
End: 2023-09-05

## 2023-09-06 ENCOUNTER — TELEPHONE (OUTPATIENT)
Dept: PRIMARY CARE CLINIC | Facility: CLINIC | Age: 43
End: 2023-09-06

## 2023-11-14 ENCOUNTER — TELEMEDICINE (OUTPATIENT)
Age: 43
End: 2023-11-14
Payer: COMMERCIAL

## 2023-11-14 DIAGNOSIS — G47.10 HYPERSOMNIA, UNSPECIFIED: ICD-10-CM

## 2023-11-14 DIAGNOSIS — I10 PRIMARY HYPERTENSION: ICD-10-CM

## 2023-11-14 DIAGNOSIS — G47.33 OBSTRUCTIVE SLEEP APNEA (ADULT) (PEDIATRIC): Primary | ICD-10-CM

## 2023-11-14 PROCEDURE — 99215 OFFICE O/P EST HI 40 MIN: CPT | Performed by: NURSE PRACTITIONER

## 2023-11-14 ASSESSMENT — SLEEP AND FATIGUE QUESTIONNAIRES
HOW LIKELY ARE YOU TO NOD OFF OR FALL ASLEEP WHILE WATCHING TV: SLIGHT CHANCE OF DOZING
HOW LIKELY ARE YOU TO NOD OFF OR FALL ASLEEP WHILE SITTING AND TALKING TO SOMEONE: WOULD NEVER DOZE
DO YOU HAVE DIFFICULTY BEING AS ACTIVE AS YOU WANT TO BE IN THE EVENING BECAUSE YOU ARE SLEEPY OR TIRED: YES, MODERATE
FOSQ SCORE: 15
HOW LIKELY ARE YOU TO NOD OFF OR FALL ASLEEP WHILE SITTING INACTIVE IN A PUBLIC PLACE: 1
DO YOU HAVE DIFFICULTY OPERATING A MOTOR VEHICLE FOR LONG DISTANCES (GREATER THAN 100 MILES) BECAUSE YOU BECOME SLEEPY: NO
DO YOU HAVE DIFFICULTY CONCENTRATING ON THE THINGS YOU DO BECAUSE YOU ARE SLEEPY OR TIRED: YES, MODERATE
DO YOU GENERALLY HAVE DIFFICULTY REMEMBERING THINGS BECAUSE YOU ARE SLEEPY OR TIRED: YES, MODERATE
HOW LIKELY ARE YOU TO NOD OFF OR FALL ASLEEP WHILE SITTING QUIETLY AFTER LUNCH WITHOUT ALCOHOL: SLIGHT CHANCE OF DOZING
ESS TOTAL SCORE: 7
HAS YOUR MOOD BEEN AFFECTED BECAUSE YOU ARE SLEEPY OR TIRED: YES, LITTLE
DO YOU HAVE DIFFICULTY OPERATING A MOTOR VEHICLE FOR SHORT DISTANCES (LESS THAN 100 MILES) BECAUSE YOU BECOME SLEEPY: NO
HOW LIKELY ARE YOU TO NOD OFF OR FALL ASLEEP IN A CAR, WHILE STOPPED FOR A FEW MINUTES IN TRAFFIC: 0
HOW LIKELY ARE YOU TO NOD OFF OR FALL ASLEEP WHILE LYING DOWN TO REST IN THE AFTERNOON WHEN CIRCUMSTANCES PERMIT: 2
DO YOU HAVE DIFFICULTY VISITING YOUR FAMILY OR FRIENDS IN THEIR HOME BECAUSE YOU BECOME SLEEPY OR TIRED: NO
HOW LIKELY ARE YOU TO NOD OFF OR FALL ASLEEP WHILE SITTING QUIETLY AFTER LUNCH WITHOUT ALCOHOL: 1
HOW LIKELY ARE YOU TO NOD OFF OR FALL ASLEEP WHEN YOU ARE A PASSENGER IN A CAR FOR AN HOUR WITHOUT A BREAK: SLIGHT CHANCE OF DOZING
HOW LIKELY ARE YOU TO NOD OFF OR FALL ASLEEP WHILE SITTING AND READING: 1
HOW LIKELY ARE YOU TO NOD OFF OR FALL ASLEEP WHILE LYING DOWN TO REST IN THE AFTERNOON WHEN CIRCUMSTANCES PERMIT: MODERATE CHANCE OF DOZING
HOW LIKELY ARE YOU TO NOD OFF OR FALL ASLEEP WHILE SITTING AND TALKING TO SOMEONE: 0
HOW LIKELY ARE YOU TO NOD OFF OR FALL ASLEEP WHEN YOU ARE A PASSENGER IN A CAR FOR AN HOUR WITHOUT A BREAK: 1
HOW LIKELY ARE YOU TO NOD OFF OR FALL ASLEEP WHILE WATCHING TV: 1
HOW LIKELY ARE YOU TO NOD OFF OR FALL ASLEEP WHILE SITTING AND READING: SLIGHT CHANCE OF DOZING
DO YOU HAVE DIFFICULTY BEING AS ACTIVE AS YOU WANT TO BE IN THE MORNING BECAUSE YOU ARE SLEEPY OR TIRED: YES, MODERATE
HOW LIKELY ARE YOU TO NOD OFF OR FALL ASLEEP WHILE SITTING INACTIVE IN A PUBLIC PLACE: SLIGHT CHANCE OF DOZING
HOW LIKELY ARE YOU TO NOD OFF OR FALL ASLEEP IN A CAR, WHILE STOPPED FOR A FEW MINUTES IN TRAFFIC: WOULD NEVER DOZE
HAS YOUR RELATIONSHIP WITH FAMILY, FRIENDS OR WORK COLLEAGUES BEEN AFFECTED BECAUSE YOU ARE SLEEPY OR TIRED: NO
DO YOU HAVE DIFFICULTY WATCHING A MOVIE OR VIDEO BECAUSE YOU BECOME SLEEPY OR TIRED: YES, A LITTLE

## 2023-11-14 NOTE — PATIENT INSTRUCTIONS
drowsiness. Medications that impair a drivers attention should have a warning label. Alcohol naturally makes you sleepy and on its own can cause accidents. Combined with excessive drowsiness its effects are amplified. Signs of Drowsy Driving:   * You don't remember driving the last few miles   * You may drift out of your lady   * You are unable to focus and your thoughts wander   * You may yawn more often than normal   * You have difficulty keeping your eyes open / nodding off   * Missing traffic signs, speeding, or tailgating  Prevention-   Good sleep hygiene, lifestyle and behavioral choices have the most impact on drowsy driving. There is no substitute for sleep and the average person requires 8 hours nightly. If you find yourself driving drowsy, stop and sleep. Consider the sleep hygiene tips provided during your visit as well. Medication Refill Policy: Refills for all medications require 1 week advance notice. Please have your pharmacy fax a refill request. We are unable to fax, or call in \"controled substance\" medications and you will need to pick these prescriptions up from our office.

## 2023-11-14 NOTE — PROGRESS NOTES
Fernando Schumacher (: 1980) is a 37 y.o. male, established patient, seen for positive airway pressure follow-up, he was last seen by Dr. Sebastián Khanna on 2021, prior notes and sleep testing reviewed in detail. Home sleep test 2017 showed AHI of 37.5/hr with a lowest SpO2 of 72%, duration of SpO2 < 88% 55.4 min. Weight at time of sleep testing 295 pounds. Subsequent BiPAP titration 2018 after continued daytime sleepiness with APAP showed adequate treatment at a pressure of 14/5 cmH2O. Weight at time of sleep testing 306 pounds. Negative MSLT 2019 showed Avg sleep latency of 16:12 minutes and no sleep onset REM periods. Sleep study reports added to media by provider. ASSESSMENT/PLAN:   Diagnosis Orders   1. Obstructive sleep apnea (adult) (pediatric)  DME Order for (Specify) as OP      2. Hypersomnia, unspecified        3. Primary hypertension        4. Adult BMI 45.0-49.9 kg/sq m (HCC)            AHI = 37.5(2017. On ResMed Bi - Level :  IPAP 15, EPAP 9 cmH2O. Set up 2018. He is adherent with PAP therapy and PAP continues to benefit patient and remains necessary for control of his sleep apnea. Follow-up and Dispositions    Return in about 6 months (around 2024) for compliance follow up. Sleep Apnea -    Sleep apnea condition has been exacerbated, worsened daytime sleepiness despite consistent PAP use. Continue on current pressures, current device settings adequately treating sleep apnea. Follow up with PCP for updated lab work. He will trial more activity during the day and a brief nap at lunchtime. *  Supplies - full face mask and heated tubing    Orders Placed This Encounter   Procedures    DME Order for (Specify) as OP     Diagnosis: (G47.33) KEVIN (obstructive sleep apnea)  (primary encounter diagnosis)     Replacement Supplies for Positive Airway Pressure Therapy Device:   Duration of need: 99 months.  Full Face Mask 1 every 3 months.    Full

## 2023-11-29 ENCOUNTER — CLINICAL DOCUMENTATION (OUTPATIENT)
Age: 43
End: 2023-11-29

## 2023-12-19 DIAGNOSIS — I15.2 HYPERTENSION ASSOCIATED WITH DIABETES (HCC): ICD-10-CM

## 2023-12-19 DIAGNOSIS — E11.59 HYPERTENSION ASSOCIATED WITH DIABETES (HCC): ICD-10-CM

## 2023-12-19 DIAGNOSIS — E11.8 DIABETES MELLITUS WITH COMPLICATION (HCC): ICD-10-CM

## 2023-12-19 DIAGNOSIS — E78.2 COMBINED HYPERLIPIDEMIA: ICD-10-CM

## 2023-12-20 LAB
ALBUMIN SERPL-MCNC: 3.9 G/DL (ref 3.5–5)
ALBUMIN/GLOB SERPL: 1 (ref 1.1–2.2)
ALP SERPL-CCNC: 123 U/L (ref 45–117)
ALT SERPL-CCNC: 30 U/L (ref 12–78)
ANION GAP SERPL CALC-SCNC: 7 MMOL/L (ref 5–15)
AST SERPL-CCNC: 21 U/L (ref 15–37)
BILIRUB SERPL-MCNC: 0.9 MG/DL (ref 0.2–1)
BUN SERPL-MCNC: 10 MG/DL (ref 6–20)
BUN/CREAT SERPL: 8 (ref 12–20)
CALCIUM SERPL-MCNC: 9.1 MG/DL (ref 8.5–10.1)
CHLORIDE SERPL-SCNC: 104 MMOL/L (ref 97–108)
CHOLEST SERPL-MCNC: 268 MG/DL
CO2 SERPL-SCNC: 29 MMOL/L (ref 21–32)
CREAT SERPL-MCNC: 1.24 MG/DL (ref 0.7–1.3)
EST. AVERAGE GLUCOSE BLD GHB EST-MCNC: 174 MG/DL
GLOBULIN SER CALC-MCNC: 4 G/DL (ref 2–4)
GLUCOSE SERPL-MCNC: 178 MG/DL (ref 65–100)
HBA1C MFR BLD: 7.7 % (ref 4–5.6)
HDLC SERPL-MCNC: 40 MG/DL
HDLC SERPL: 6.7 (ref 0–5)
LDLC SERPL CALC-MCNC: 195 MG/DL (ref 0–100)
POTASSIUM SERPL-SCNC: 3.6 MMOL/L (ref 3.5–5.1)
PROT SERPL-MCNC: 7.9 G/DL (ref 6.4–8.2)
SODIUM SERPL-SCNC: 140 MMOL/L (ref 136–145)
TRIGL SERPL-MCNC: 165 MG/DL
VLDLC SERPL CALC-MCNC: 33 MG/DL

## 2023-12-26 ENCOUNTER — TELEPHONE (OUTPATIENT)
Dept: PRIMARY CARE CLINIC | Facility: CLINIC | Age: 43
End: 2023-12-26

## 2023-12-26 NOTE — TELEPHONE ENCOUNTER
Called plan to see what was going on with pts Raheem SPEARS. They asked if I could fax over lab and supporting documents for reconsideration. Fax has been sent.

## 2024-01-04 ENCOUNTER — TELEPHONE (OUTPATIENT)
Dept: PRIMARY CARE CLINIC | Facility: CLINIC | Age: 44
End: 2024-01-04

## 2024-01-04 NOTE — TELEPHONE ENCOUNTER
Called pts plan in regards to PA for Trulicity. Anahi who I spoke to said they did receive paper work but the A1C was to dark for them to read. I told her his A1C is 7.7 and I can re-fax it clearer. Fax number was provided and fax of A1C has been faxed.

## 2024-03-02 ASSESSMENT — SLEEP AND FATIGUE QUESTIONNAIRES
HOW LIKELY ARE YOU TO NOD OFF OR FALL ASLEEP WHILE SITTING INACTIVE IN A PUBLIC PLACE: 1
HOW LIKELY ARE YOU TO NOD OFF OR FALL ASLEEP WHILE SITTING AND TALKING TO SOMEONE: 0
HAS YOUR RELATIONSHIP WITH FAMILY, FRIENDS OR WORK COLLEAGUES BEEN AFFECTED BECAUSE YOU ARE SLEEPY OR TIRED: NO
HOW LIKELY ARE YOU TO NOD OFF OR FALL ASLEEP WHILE LYING DOWN TO REST IN THE AFTERNOON WHEN CIRCUMSTANCES PERMIT: HIGH CHANCE OF DOZING
HOW LIKELY ARE YOU TO NOD OFF OR FALL ASLEEP IN A CAR, WHILE STOPPED FOR A FEW MINUTES IN TRAFFIC: 0
DO YOU HAVE DIFFICULTY BEING AS ACTIVE AS YOU WANT TO BE IN THE MORNING BECAUSE YOU ARE SLEEPY OR TIRED: YES, MODERATE
DO YOU HAVE DIFFICULTY OPERATING A MOTOR VEHICLE FOR SHORT DISTANCES (LESS THAN 100 MILES) BECAUSE YOU BECOME SLEEPY: NO
HOW LIKELY ARE YOU TO NOD OFF OR FALL ASLEEP WHEN YOU ARE A PASSENGER IN A CAR FOR AN HOUR WITHOUT A BREAK: WOULD NEVER DOZE
DO YOU HAVE DIFFICULTY BEING AS ACTIVE AS YOU WANT TO BE IN THE EVENING BECAUSE YOU ARE SLEEPY OR TIRED: YES, EXTREME
HOW LIKELY ARE YOU TO NOD OFF OR FALL ASLEEP WHILE SITTING AND TALKING TO SOMEONE: WOULD NEVER DOZE
HOW LIKELY ARE YOU TO NOD OFF OR FALL ASLEEP WHILE WATCHING TV: 2
HOW LIKELY ARE YOU TO NOD OFF OR FALL ASLEEP WHEN YOU ARE A PASSENGER IN A CAR FOR AN HOUR WITHOUT A BREAK: 0
HOW LIKELY ARE YOU TO NOD OFF OR FALL ASLEEP WHILE SITTING QUIETLY AFTER LUNCH WITHOUT ALCOHOL: WOULD NEVER DOZE
HAS YOUR MOOD BEEN AFFECTED BECAUSE YOU ARE SLEEPY OR TIRED: YES, MODERATE
FOSQ SCORE: 12.5
HOW LIKELY ARE YOU TO NOD OFF OR FALL ASLEEP WHILE SITTING AND READING: 2
DO YOU HAVE DIFFICULTY CONCENTRATING ON THE THINGS YOU DO BECAUSE YOU ARE SLEEPY OR TIRED: YES, EXTREME
HOW LIKELY ARE YOU TO NOD OFF OR FALL ASLEEP WHILE LYING DOWN TO REST IN THE AFTERNOON WHEN CIRCUMSTANCES PERMIT: 3
HOW LIKELY ARE YOU TO NOD OFF OR FALL ASLEEP IN A CAR, WHILE STOPPED FOR A FEW MINUTES IN TRAFFIC: WOULD NEVER DOZE
ESS TOTAL SCORE: 8
HOW LIKELY ARE YOU TO NOD OFF OR FALL ASLEEP WHILE WATCHING TV: MODERATE CHANCE OF DOZING
HOW LIKELY ARE YOU TO NOD OFF OR FALL ASLEEP WHILE SITTING AND READING: MODERATE CHANCE OF DOZING
DO YOU HAVE DIFFICULTY VISITING YOUR FAMILY OR FRIENDS IN THEIR HOME BECAUSE YOU BECOME SLEEPY OR TIRED: NO
DO YOU HAVE DIFFICULTY WATCHING A MOVIE OR VIDEO BECAUSE YOU BECOME SLEEPY OR TIRED: YES, MODERATE
HOW LIKELY ARE YOU TO NOD OFF OR FALL ASLEEP WHILE SITTING INACTIVE IN A PUBLIC PLACE: SLIGHT CHANCE OF DOZING
DO YOU GENERALLY HAVE DIFFICULTY REMEMBERING THINGS BECAUSE YOU ARE SLEEPY OR TIRED: YES, EXTREME
DO YOU HAVE DIFFICULTY OPERATING A MOTOR VEHICLE FOR LONG DISTANCES (GREATER THAN 100 MILES) BECAUSE YOU BECOME SLEEPY: NO
HOW LIKELY ARE YOU TO NOD OFF OR FALL ASLEEP WHILE SITTING QUIETLY AFTER LUNCH WITHOUT ALCOHOL: 0

## 2024-03-04 ENCOUNTER — TELEMEDICINE (OUTPATIENT)
Age: 44
End: 2024-03-04
Payer: COMMERCIAL

## 2024-03-04 DIAGNOSIS — F41.9 ANXIETY AND DEPRESSION: ICD-10-CM

## 2024-03-04 DIAGNOSIS — G47.10 HYPERSOMNIA WITH SLEEP APNEA: Primary | ICD-10-CM

## 2024-03-04 DIAGNOSIS — G47.419 NARCOLEPSY WITHOUT CATAPLEXY: ICD-10-CM

## 2024-03-04 DIAGNOSIS — G47.30 HYPERSOMNIA WITH SLEEP APNEA: Primary | ICD-10-CM

## 2024-03-04 DIAGNOSIS — F32.A ANXIETY AND DEPRESSION: ICD-10-CM

## 2024-03-04 DIAGNOSIS — I10 PRIMARY HYPERTENSION: ICD-10-CM

## 2024-03-04 PROCEDURE — 99214 OFFICE O/P EST MOD 30 MIN: CPT | Performed by: INTERNAL MEDICINE

## 2024-03-04 RX ORDER — ARMODAFINIL 150 MG/1
150 TABLET ORAL DAILY
Qty: 30 TABLET | Refills: 5 | Status: SHIPPED | OUTPATIENT
Start: 2024-03-04 | End: 2024-08-31

## 2024-04-24 ENCOUNTER — TELEPHONE (OUTPATIENT)
Age: 44
End: 2024-04-24

## 2024-05-22 ENCOUNTER — HOSPITAL ENCOUNTER (OUTPATIENT)
Facility: HOSPITAL | Age: 44
Discharge: HOME | End: 2024-05-25
Payer: COMMERCIAL

## 2024-05-22 VITALS
HEIGHT: 67 IN | WEIGHT: 315 LBS | TEMPERATURE: 97.2 F | SYSTOLIC BLOOD PRESSURE: 158 MMHG | OXYGEN SATURATION: 95 % | DIASTOLIC BLOOD PRESSURE: 96 MMHG | HEART RATE: 99 BPM | BODY MASS INDEX: 49.44 KG/M2

## 2024-05-22 DIAGNOSIS — G47.419 NARCOLEPSY WITHOUT CATAPLEXY: ICD-10-CM

## 2024-05-22 PROCEDURE — 95811 POLYSOM 6/>YRS CPAP 4/> PARM: CPT | Performed by: INTERNAL MEDICINE

## 2024-05-23 ENCOUNTER — HOSPITAL ENCOUNTER (OUTPATIENT)
Facility: HOSPITAL | Age: 44
Discharge: HOME | End: 2024-05-23
Payer: COMMERCIAL

## 2024-05-23 DIAGNOSIS — G47.419 NARCOLEPSY WITHOUT CATAPLEXY: ICD-10-CM

## 2024-05-23 PROCEDURE — 95805 MULTIPLE SLEEP LATENCY TEST: CPT | Performed by: INTERNAL MEDICINE

## 2024-05-23 NOTE — PROGRESS NOTES
[address]  [place] [address]  Tel.  [phone]  Fax. [phone] [address]  [place] [address]  Tel.  [phone]  Fax. [phone] [address] [place] [address]  Tel.  [phone]  Fax. [phone]       Dimitri cartagena is in the lab today for a MSLT (Multiple Sleep Latency Test) as ordered by their physician. An overnight BiPAP titration study was performed prior.        Acquisition Notes:  Five naps were performed.  Sleep was achieved in each nap session.  Many arousals were observed during the naps due to respiratory disturbances and snore.  Patient remained awake between each nap session.  Other comments:   Patient given breakfast after Nap 1 and Lunch after Nap 3.    No caffeine, chocolate or excessive sugar was consumed.  Urine specimen collected per protocol.        POST Test:  After the final nap session, electrodes were removed.    Patient was advised that they will be contacted with results within 10-15 business days and/or a follow-up visit will be made with their physician to discuss the results.  Patient stated that they are alert and ok to drive.     The patient was discharged.  Equipment and room cleaned per infection control policy.

## 2024-05-24 LAB
AMPHETAMINES UR QL SCN: NEGATIVE NG/ML
BARBITURATES UR QL: NEGATIVE NG/ML
BENZODIAZ UR QL SCN: NEGATIVE NG/ML
BZE UR QL: NEGATIVE NG/ML
CANNABINOIDS UR QL SCN: NEGATIVE NG/ML
CREAT UR-MCNC: 70.9 MG/DL (ref 20–300)
ETHANOL UR-MCNC: NEGATIVE %
MEPERIDINE UR QL: NEGATIVE NG/ML
METHADONE UR QL: NEGATIVE NG/ML
OPIATES UR QL SCN: NEGATIVE NG/ML
OXYCODONE+OXYMORPHONE UR QL SCN: NEGATIVE NG/ML
PCP UR QL: NEGATIVE NG/ML
PROPOXYPH UR QL: NEGATIVE NG/ML
SPECIMEN STATUS REPORT: NORMAL

## 2024-05-29 ENCOUNTER — OFFICE VISIT (OUTPATIENT)
Dept: PRIMARY CARE CLINIC | Facility: CLINIC | Age: 44
End: 2024-05-29
Payer: COMMERCIAL

## 2024-05-29 VITALS
TEMPERATURE: 97.1 F | HEIGHT: 67 IN | BODY MASS INDEX: 49.44 KG/M2 | HEART RATE: 84 BPM | RESPIRATION RATE: 20 BRPM | SYSTOLIC BLOOD PRESSURE: 138 MMHG | OXYGEN SATURATION: 97 % | DIASTOLIC BLOOD PRESSURE: 88 MMHG | WEIGHT: 315 LBS

## 2024-05-29 DIAGNOSIS — I10 PRIMARY HYPERTENSION: ICD-10-CM

## 2024-05-29 DIAGNOSIS — E78.2 MIXED HYPERLIPIDEMIA: ICD-10-CM

## 2024-05-29 DIAGNOSIS — E11.65 TYPE 2 DIABETES MELLITUS WITH HYPERGLYCEMIA, WITHOUT LONG-TERM CURRENT USE OF INSULIN (HCC): ICD-10-CM

## 2024-05-29 DIAGNOSIS — E11.65 TYPE 2 DIABETES MELLITUS WITH HYPERGLYCEMIA, WITHOUT LONG-TERM CURRENT USE OF INSULIN (HCC): Primary | ICD-10-CM

## 2024-05-29 DIAGNOSIS — G47.33 OSA TREATED WITH BIPAP: ICD-10-CM

## 2024-05-29 DIAGNOSIS — E66.01 MORBIDLY OBESE (HCC): ICD-10-CM

## 2024-05-29 PROCEDURE — 99214 OFFICE O/P EST MOD 30 MIN: CPT | Performed by: INTERNAL MEDICINE

## 2024-05-29 PROCEDURE — 3075F SYST BP GE 130 - 139MM HG: CPT | Performed by: INTERNAL MEDICINE

## 2024-05-29 PROCEDURE — 3079F DIAST BP 80-89 MM HG: CPT | Performed by: INTERNAL MEDICINE

## 2024-05-29 RX ORDER — INSULIN GLARGINE 100 [IU]/ML
10 INJECTION, SOLUTION SUBCUTANEOUS NIGHTLY
Qty: 5 ADJUSTABLE DOSE PRE-FILLED PEN SYRINGE | Refills: 0 | Status: SHIPPED | OUTPATIENT
Start: 2024-05-29

## 2024-05-29 RX ORDER — BREXPIPRAZOLE 1 MG/1
1 TABLET ORAL DAILY
COMMUNITY
Start: 2024-05-21

## 2024-05-29 ASSESSMENT — ENCOUNTER SYMPTOMS
SORE THROAT: 0
CONSTIPATION: 0
COUGH: 0
BACK PAIN: 0
SHORTNESS OF BREATH: 0

## 2024-05-29 ASSESSMENT — PATIENT HEALTH QUESTIONNAIRE - PHQ9
SUM OF ALL RESPONSES TO PHQ QUESTIONS 1-9: 2
SUM OF ALL RESPONSES TO PHQ9 QUESTIONS 1 & 2: 2
SUM OF ALL RESPONSES TO PHQ QUESTIONS 1-9: 2
1. LITTLE INTEREST OR PLEASURE IN DOING THINGS: SEVERAL DAYS
2. FEELING DOWN, DEPRESSED OR HOPELESS: SEVERAL DAYS
SUM OF ALL RESPONSES TO PHQ QUESTIONS 1-9: 2
SUM OF ALL RESPONSES TO PHQ QUESTIONS 1-9: 2

## 2024-05-29 NOTE — PROGRESS NOTES
ASSESSMENT and PLAN  Dimitri was seen today for follow-up.    Diagnoses and all orders for this visit:    Type 2 diabetes mellitus with hyperglycemia, without long-term current use of insulin ([facility])  His glucose today was 438. I started him on Lantus 10 units daily and Ozempic 1 weekly. He will continue with Synjardy 12.5-1000 mg daily. Will recheck labs today. I also referred him to diabetic educator to discuss starting insulin.   -     Hemoglobin A1C; Future  -     Comprehensive Metabolic Panel; Future  -     Semaglutide, 1 MG/DOSE, 2 MG/1.5ML SOPN; Inject 1 mg into the skin every 7 days  -     insulin glargine (LANTUS SOLOSTAR) 100 UNIT/ML injection pen; Inject 10 Units into the skin nightly  -     [facility] [place]    Morbidly obese ([facility])  He will continue to work on increasing exercise.     Primary hypertension  BP today is at goal. He continues with losartan-HCTZ 50-12.5 mg daily and Bystolic 5 mg daily. Will recheck CMP.  -     Comprehensive Metabolic Panel; Future    KEVIN treated with BiPAP  He continues with BiPAP. He is waiting to get results from recent sleep study.     Mixed hyperlipidemia  Will recheck labs. He continues with atorvastatin 10 mg daily.   -     Lipid Panel; Future      HISTORY OF PRESENT ILLNESS  Dimitri Cabrales is a 44 y.o. male here today for follow up on chronic conditions. He is fasting today.     A1c was 7.7 on 12/19/23. He hasn't been checking his glucose regularly. He checked it today and his glucose was 438.  Pt is taking Synjardy 12.5-1000 mg daily. He wasn't able to get Trulicity because the pharmacy did not have it in stock. He was previously on Ozempic but his insurance stopped covering it because his A1c was controlled. He stopped Ozempic about 9 months ago. He has never been on insulin. He has not been walking much lately because he has been tired. He eats 2 meals daily (lunch and dinner). He saw a diabetic educator several years ago.      He report

## 2024-05-30 LAB — HBA1C MFR BLD: 10 % (ref 4.8–5.6)

## 2024-06-01 LAB
ALBUMIN SERPL-MCNC: 4.6 G/DL (ref 4.1–5.1)
ALBUMIN/GLOB SERPL: 1.2 {RATIO} (ref 1.2–2.2)
ALP SERPL-CCNC: 151 IU/L (ref 44–121)
ALT SERPL-CCNC: 37 IU/L (ref 0–44)
AST SERPL-CCNC: 37 IU/L (ref 0–40)
BILIRUB SERPL-MCNC: 0.5 MG/DL (ref 0–1.2)
BUN SERPL-MCNC: 5 MG/DL (ref 6–24)
BUN/CREAT SERPL: 4 (ref 9–20)
CALCIUM SERPL-MCNC: 10.3 MG/DL (ref 8.7–10.2)
CHLORIDE SERPL-SCNC: 97 MMOL/L (ref 96–106)
CHOLEST SERPL-MCNC: 286 MG/DL (ref 100–199)
CO2 SERPL-SCNC: 23 MMOL/L (ref 20–29)
CREAT SERPL-MCNC: 1.15 MG/DL (ref 0.76–1.27)
EGFRCR SERPLBLD CKD-EPI 2021: 80 ML/MIN/1.73
GLOBULIN SER CALC-MCNC: 3.8 G/DL (ref 1.5–4.5)
GLUCOSE SERPL-MCNC: 187 MG/DL (ref 70–99)
HDLC SERPL-MCNC: 35 MG/DL
LABORATORY COMMENT REPORT: ABNORMAL
LDLC SERPL CALC-MCNC: 197 MG/DL (ref 0–99)
POTASSIUM SERPL-SCNC: 3.4 MMOL/L (ref 3.5–5.2)
PROT SERPL-MCNC: 8.4 G/DL (ref 6–8.5)
SODIUM SERPL-SCNC: 140 MMOL/L (ref 134–144)
TRIGL SERPL-MCNC: 273 MG/DL (ref 0–149)
VLDLC SERPL CALC-MCNC: 54 MG/DL (ref 5–40)

## 2024-06-03 DIAGNOSIS — E78.2 MIXED HYPERLIPIDEMIA: Primary | ICD-10-CM

## 2024-06-03 RX ORDER — ATORVASTATIN CALCIUM 20 MG/1
20 TABLET, FILM COATED ORAL DAILY
Qty: 30 TABLET | Refills: 3 | Status: SHIPPED | OUTPATIENT
Start: 2024-06-03

## 2024-06-05 ENCOUNTER — CLINICAL DOCUMENTATION (OUTPATIENT)
Age: 44
End: 2024-06-05

## 2024-06-06 ENCOUNTER — TELEPHONE (OUTPATIENT)
Dept: PRIMARY CARE CLINIC | Facility: CLINIC | Age: 44
End: 2024-06-06

## 2024-06-06 NOTE — TELEPHONE ENCOUNTER
Patient called to ask about the PA for his Ozempic to see if a PA was started and if there was any information the nurse can give the patient in regards to the PA.  Please call # [phone].  He stated he also called on Tuesday as well but I let him know I didn't see any notes where we received any faxed from his pharmacy or notes that he called previously.

## 2024-06-06 NOTE — PROGRESS NOTES
Titration study interpreted.     * Device pressure change to Bi - Level  Max 25; Min EPAP 15; PS 06 cmH2O by lead technologist either remotely or at PAP clinic (notify patient).    * PAP card download in 4 weeks.     * Office visit in 6 months or as needed.

## 2024-06-12 ENCOUNTER — CLINICAL DOCUMENTATION (OUTPATIENT)
Age: 44
End: 2024-06-12

## 2024-06-15 DIAGNOSIS — E78.2 MIXED HYPERLIPIDEMIA: ICD-10-CM

## 2024-06-17 RX ORDER — ATORVASTATIN CALCIUM 20 MG/1
20 TABLET, FILM COATED ORAL DAILY
Qty: 90 TABLET | Refills: 1 | OUTPATIENT
Start: 2024-06-17

## 2024-06-24 ENCOUNTER — OFFICE VISIT (OUTPATIENT)
Age: 44
End: 2024-06-24
Payer: COMMERCIAL

## 2024-06-24 DIAGNOSIS — E11.65 TYPE 2 DIABETES MELLITUS WITH HYPERGLYCEMIA, WITHOUT LONG-TERM CURRENT USE OF INSULIN (HCC): Primary | ICD-10-CM

## 2024-06-24 PROCEDURE — G0108 DIAB MANAGE TRN  PER INDIV: HCPCS | Performed by: DIETITIAN, REGISTERED

## 2024-06-24 NOTE — PROGRESS NOTES
Yes      Stage of change: Action    Recently started insulin and Ozempic. Dimitri demonstrated excellent pen technique and is disposing of his sharps correctly per [facility] guidelines. We reviewed actions of his medication and how they are working to manage his DM control.  Shared that he had excellent DM management in the past - this was derailed with medication coverage. Would like to work on decreasing some of his medications for management.   We discussed side effects of insulin and GLP1 along with SGLT2 inhibitor and s/s to watch for and reach out to provider for guidance (DKA, hypoglycemia, etc.)   Problem Solving  Current state  Hypoglycemia Management:  What are signs and symptoms of hypoglycemia that you experience: Shaking/trembling, Sweat/clammy skin    How do you prevent hypoglycemia: patient is unaware of how to treat low blood sugars    How do you treat hypoglycemia:  eat peanuts and go somewhere to eat food    Hyperglycemia Management:  What are signs and symptoms of hyperglycemia that you experience: Frequent urination    How can you prevent hyperglycemia: take medications as instructed    Sick Day Management:  What do you do differently on sick days:  Pt reported being unaware of self-management on sick days    Pattern Management:  Do you notice blood glucose patterns when you look at the readings in your meter or logbook? Yes    How do you use the blood glucose readings from your meter or logbook? understand how body responds to meals     Would benefit from DSMES related to Problem Solving: No      Articulates appropriate strategies to address hypoglycemia, hyperglycemia, sick day care and BG pattern: Yes      Stage of change: Maintenance    We will practice problem solving while we meet to address his meal planning and DM management guidelines.   Note: Content derived from the American Association of Diabetes Educators' Diabetes Education Curriculum: A Guide to Successful Self-Management (3rd edition)

## 2024-07-12 ENCOUNTER — TELEPHONE (OUTPATIENT)
Age: 44
End: 2024-07-12

## 2024-07-12 DIAGNOSIS — G47.33 OSA (OBSTRUCTIVE SLEEP APNEA): Primary | ICD-10-CM

## 2024-07-12 NOTE — TELEPHONE ENCOUNTER
Patient LVM earlier this week inquiring about a new device. Upon returning the call, patient made it known that his machine is on its last life since it has been 5 years. Patient has been compliant with Therapy, and has follow up appointment scheduled. Will be forwarding to Dr. Anglin for further action.

## 2024-07-15 ENCOUNTER — CLINICAL DOCUMENTATION (OUTPATIENT)
Age: 44
End: 2024-07-15

## 2024-07-15 NOTE — TELEPHONE ENCOUNTER
Encounter Diagnosis   Name Primary?    KEVIN (obstructive sleep apnea) Yes         Orders Placed This Encounter   Procedures    DME Order for (Specify) as OP     - DME device ordered - ResMed Device with Heated Humidifer  / .   - Diagnosis: Obstructive Sleep Apnea (G47.33)  - Length of Need: Lifetime      ResMed VPAP Auto (VAuto Mode): Maximum IPAP: 25 cmH2O; Minimum EPAP: 15 cmH2O; PS: 06 cmH2O.  Ramp Time: 30 Minutes.     CPAP mask -  As fitted during titration OR patient preference, headgear, tubing, and filter;  heated humidifier; wireless modem. Remote monitoring enrollment.    Rojas Anglin MD, FAA; NPI: [id number]  7/15/2024

## 2024-07-19 ENCOUNTER — OFFICE VISIT (OUTPATIENT)
Age: 44
End: 2024-07-19
Payer: COMMERCIAL

## 2024-07-19 DIAGNOSIS — E11.65 TYPE 2 DIABETES MELLITUS WITH HYPERGLYCEMIA, WITHOUT LONG-TERM CURRENT USE OF INSULIN (HCC): Primary | ICD-10-CM

## 2024-07-19 PROCEDURE — G0108 DIAB MANAGE TRN  PER INDIV: HCPCS | Performed by: DIETITIAN, REGISTERED

## 2024-07-23 NOTE — PROGRESS NOTES
[facility]  Diabetes Self-Management Education & Support Program  Encounter Note      SUMMARY  Diabetes self-care management training was completed related to healthy eating. he participant will return on August 16 to continue DSMES related to healthy eating. The participant did not identify SMART Goal(s) and will practice knowledge and skills related to healthy eating and monitoring to improve diabetes self-management.        EVALUATION:  Mario has completed class series in the past - shared that he has been working on using his smartphone destin for logging his meals. Focused nutrition education on improving quality CHO with lower kcal intakes with simple meal preparation. He is halving his take out portions to help decrease kcal intakes.    RECOMMENDATIONS:  Continue to make changes to food choices - add complex CHO  Portion control your protein intakes: breakfast 2-3 oz, lunch and dinner 4-5 oz.   Watch intake of high fat foods to decrease kcal intakes  Switch to diet beverages to decrease glucose and kcal.     TOPICS DISCUSSED TODAY:  WHAT CAN I EAT? 60      Next provider visit is pending.       DATE DSMES TOPIC EVALUATION     7/19/24 WHAT CAN I EAT?   General principles   Determining a healthy weight   Nutritional terms & tools   Healthy Plate method   Carbohydrate Counting   Reading food labels   Free destin     The participant   Uses Healthy Plate principles in constructing meals: Yes  Reads food labels in choosing acceptable foods: Yes    Mario and I spent most of today's visit discussing how to re-distribute his carbohydrate intakes. We also reviewed portions and expanded his food choices to include adequate CHO to prevent nutrient deficiencies. Using his smartphone apps - developed strategies to increase quality CHO options and decrease empty kcal and how to build meals that are simple to prepare at home. Shared tips to decrease kcal and improve intakes of dietary fiber for

## 2024-08-02 DIAGNOSIS — E11.65 TYPE 2 DIABETES MELLITUS WITH HYPERGLYCEMIA, WITHOUT LONG-TERM CURRENT USE OF INSULIN (HCC): ICD-10-CM

## 2024-08-03 RX ORDER — SEMAGLUTIDE 1.34 MG/ML
1 INJECTION, SOLUTION SUBCUTANEOUS
Qty: 3 ML | Refills: 0 | Status: SHIPPED | OUTPATIENT
Start: 2024-08-03

## 2024-08-16 ENCOUNTER — TELEMEDICINE (OUTPATIENT)
Age: 44
End: 2024-08-16

## 2024-08-16 DIAGNOSIS — E11.65 TYPE 2 DIABETES MELLITUS WITH HYPERGLYCEMIA, WITHOUT LONG-TERM CURRENT USE OF INSULIN (HCC): Primary | ICD-10-CM

## 2024-08-16 NOTE — PROGRESS NOTES
[facility]  Diabetes Self-Management Education & Support Program  Encounter Note      SUMMARY  Diabetes self-care management training was completed related to problem solving. The participant will return on September 26 to continue DSMES related to healthy eating. The participant did not identify SMART Goal(s) and will practice knowledge and skills related to healthy eating and monitoring and medications to improve diabetes self-management.        EVALUATION:  Pt battling illness and we scheduled virtual for today. Discussed using sick day guidelines, choose simple meals/foods - can choose smaller CHO portions more frequently if this meets his energy level instead of larger meals.     RECOMMENDATIONS:  Take out sick day guidelines and use.   Watch for s/s of DKA - nausea/abdominal cramping/vomiting and SOB  Stay hydrated per your sick day guidelines  Contact provider if hypo/hyperglycemia occurs     TOPICS DISCUSSED TODAY:  HOW DO I FIGURE OUT WHAT'S INFLUENCING MY BLOOD GLUCOSES? 30      Next provider visit is scheduled for 10/21/24         DATE DSMES TOPIC EVALUATION     8/16/2024 HOW DO I FIGURE OUT WHAT'S INFLUENCING MY BLOOD GLUCOSES?   Problem solving using SOAR   Set goals   Sort options   Arrive at a plan   Reaffirm plan   Common problems in diabetes self-care   Hypoglycemia   Hyperglycemia   Sick days   Pattern management   Disaster preparedness plan      Dimitri and I met virtually due to illness. He confirmed that he has not used his sick day guidelines. C/O being hungry and making bad choices. Encouraged him to focus on using sick day guidelines - confirmed that he knows where they are. Discussed options for simple meals including soups, sandwiches, pizza, fruit/salads along with vegetables he has at home. Reminded him of fluids and encouraged 8 oz per hour awake.   Advised to continue testing BG and increase frequency. Contact provider and if s/s of DKA arise with

## 2024-08-30 DIAGNOSIS — E11.65 TYPE 2 DIABETES MELLITUS WITH HYPERGLYCEMIA, WITHOUT LONG-TERM CURRENT USE OF INSULIN (HCC): ICD-10-CM

## 2024-08-30 RX ORDER — SEMAGLUTIDE 1.34 MG/ML
1 INJECTION, SOLUTION SUBCUTANEOUS
Qty: 3 ML | Refills: 0 | Status: SHIPPED | OUTPATIENT
Start: 2024-08-30

## 2024-08-31 SDOH — ECONOMIC STABILITY: FOOD INSECURITY: WITHIN THE PAST 12 MONTHS, THE FOOD YOU BOUGHT JUST DIDN'T LAST AND YOU DIDN'T HAVE MONEY TO GET MORE.: NEVER TRUE

## 2024-08-31 SDOH — ECONOMIC STABILITY: FOOD INSECURITY: WITHIN THE PAST 12 MONTHS, YOU WORRIED THAT YOUR FOOD WOULD RUN OUT BEFORE YOU GOT MONEY TO BUY MORE.: NEVER TRUE

## 2024-08-31 SDOH — ECONOMIC STABILITY: INCOME INSECURITY: HOW HARD IS IT FOR YOU TO PAY FOR THE VERY BASICS LIKE FOOD, HOUSING, MEDICAL CARE, AND HEATING?: NOT HARD AT ALL

## 2024-08-31 SDOH — ECONOMIC STABILITY: TRANSPORTATION INSECURITY
IN THE PAST 12 MONTHS, HAS LACK OF TRANSPORTATION KEPT YOU FROM MEETINGS, WORK, OR FROM GETTING THINGS NEEDED FOR DAILY LIVING?: NO

## 2024-09-03 ENCOUNTER — OFFICE VISIT (OUTPATIENT)
Dept: PRIMARY CARE CLINIC | Facility: CLINIC | Age: 44
End: 2024-09-03
Payer: COMMERCIAL

## 2024-09-03 VITALS
BODY MASS INDEX: 49.44 KG/M2 | HEIGHT: 67 IN | DIASTOLIC BLOOD PRESSURE: 75 MMHG | SYSTOLIC BLOOD PRESSURE: 114 MMHG | TEMPERATURE: 97 F | RESPIRATION RATE: 20 BRPM | HEART RATE: 98 BPM | OXYGEN SATURATION: 97 % | WEIGHT: 315 LBS

## 2024-09-03 DIAGNOSIS — Z23 NEEDS FLU SHOT: ICD-10-CM

## 2024-09-03 DIAGNOSIS — Z11.59 NEED FOR HEPATITIS B SCREENING TEST: ICD-10-CM

## 2024-09-03 DIAGNOSIS — F41.9 ANXIETY AND DEPRESSION: ICD-10-CM

## 2024-09-03 DIAGNOSIS — E78.2 MIXED HYPERLIPIDEMIA: ICD-10-CM

## 2024-09-03 DIAGNOSIS — E11.8 DIABETES MELLITUS WITH COMPLICATION (HCC): ICD-10-CM

## 2024-09-03 DIAGNOSIS — I10 PRIMARY HYPERTENSION: ICD-10-CM

## 2024-09-03 DIAGNOSIS — F32.A ANXIETY AND DEPRESSION: ICD-10-CM

## 2024-09-03 DIAGNOSIS — G47.33 OSA TREATED WITH BIPAP: ICD-10-CM

## 2024-09-03 DIAGNOSIS — Z23 ENCOUNTER FOR ADMINISTRATION OF VACCINE: ICD-10-CM

## 2024-09-03 DIAGNOSIS — E66.01 MORBIDLY OBESE (HCC): ICD-10-CM

## 2024-09-03 DIAGNOSIS — E11.8 DIABETES MELLITUS WITH COMPLICATION (HCC): Primary | ICD-10-CM

## 2024-09-03 PROCEDURE — 90471 IMMUNIZATION ADMIN: CPT | Performed by: INTERNAL MEDICINE

## 2024-09-03 PROCEDURE — 3046F HEMOGLOBIN A1C LEVEL >9.0%: CPT | Performed by: INTERNAL MEDICINE

## 2024-09-03 PROCEDURE — 3074F SYST BP LT 130 MM HG: CPT | Performed by: INTERNAL MEDICINE

## 2024-09-03 PROCEDURE — 99214 OFFICE O/P EST MOD 30 MIN: CPT | Performed by: INTERNAL MEDICINE

## 2024-09-03 PROCEDURE — 3078F DIAST BP <80 MM HG: CPT | Performed by: INTERNAL MEDICINE

## 2024-09-03 PROCEDURE — 90661 CCIIV3 VAC ABX FR 0.5 ML IM: CPT | Performed by: INTERNAL MEDICINE

## 2024-09-03 RX ORDER — EMPAGLIFLOZIN, METFORMIN HYDROCHLORIDE 12.5; 1 MG/1; MG/1
TABLET, EXTENDED RELEASE ORAL
Qty: 90 TABLET | Refills: 0 | Status: SHIPPED | OUTPATIENT
Start: 2024-09-03

## 2024-09-03 ASSESSMENT — PATIENT HEALTH QUESTIONNAIRE - PHQ9
SUM OF ALL RESPONSES TO PHQ QUESTIONS 1-9: 2
SUM OF ALL RESPONSES TO PHQ9 QUESTIONS 1 & 2: 2
SUM OF ALL RESPONSES TO PHQ QUESTIONS 1-9: 2
1. LITTLE INTEREST OR PLEASURE IN DOING THINGS: SEVERAL DAYS
2. FEELING DOWN, DEPRESSED OR HOPELESS: SEVERAL DAYS

## 2024-09-03 NOTE — PROGRESS NOTES
Dimitri Cabrales (:  1980) is a 44 y.o. male,Established patient, here for evaluation of the following chief complaint(s):  Follow-up (On diabetes. )    History of Present Illness  The patient is a 44-year-old male who presents for evaluation of diabetes and Hypertension.     He reports an improvement in his blood sugar levels, which now range from 120 to 180, a significant decrease from previous readings in the 300s. He administers Ozempic once a week on Saturday mornings. Initial nausea experienced during the first week of Ozempic use has since subsided. He noticed weight loss and a decrease in appetite, but these reversed after three weeks. Despite a week of insulin use prior to starting Ozempic, his blood sugar levels remained in the 300s. He continues to use insulin daily. He has discontinued Synjardy as he believed it was necessary due to the introduction of Ozempic and insulin. He reports no tingling or numbness in his feet. His physical activity includes walking two to three times a week. He expresses a desire to lose weight.    His blood pressure is well-managed with losartan, hydrochlorothiazide, and Bystolic, which he takes at night. He reports no shortness of breath.    He uses a BiPAP machine at night and reports good sleep quality. He is under the care of a psychiatrist for depression and anxiety and is currently taking Trintellix and Rexulti. He does not take medication for ADHD as a previous study indicated he does not have the condition. He occasionally receives the influenza vaccine and is considering getting the COVID-19 vaccine. He reports no bowel irregularities such as diarrhea or constipation. He is also on atorvastatin for cholesterol management and reports no leg cramps or muscle aches associated with this medication.     Physical Exam  /75 (Site: Left Upper Arm, Position: Sitting)   Pulse 98   Temp 97 °F (36.1 °C) (Temporal)   Resp 20   Ht 1.702 m (5' 7\")   Wt (!)

## 2024-09-03 NOTE — PROGRESS NOTES
\"Have you been to the ER, urgent care clinic since your last visit?  Hospitalized since your last visit?\"    NO      “Have you seen or consulted any other health care providers outside of [facility] since your last visit?”    NO      Chief Complaint   Patient presents with    Follow-up     On diabetes.        Patient provided with most updated VIS prior to administration. Opportunity given for questions and concerns provided. No concerns at this time    Immunizations administered to patient 9/3/2024 by Rosie Abdi LPN with consent.Patient tolerated procedure well. No reactions noted.

## 2024-09-04 LAB
ALBUMIN SERPL-MCNC: 4 G/DL (ref 4.1–5.1)
ALP SERPL-CCNC: 127 IU/L (ref 44–121)
ALT SERPL-CCNC: 29 IU/L (ref 0–44)
AST SERPL-CCNC: 29 IU/L (ref 0–40)
BILIRUB SERPL-MCNC: 0.6 MG/DL (ref 0–1.2)
BUN SERPL-MCNC: 12 MG/DL (ref 6–24)
BUN/CREAT SERPL: 11 (ref 9–20)
CALCIUM SERPL-MCNC: 9.2 MG/DL (ref 8.7–10.2)
CHLORIDE SERPL-SCNC: 103 MMOL/L (ref 96–106)
CHOLEST SERPL-MCNC: 231 MG/DL (ref 100–199)
CO2 SERPL-SCNC: 24 MMOL/L (ref 20–29)
CREAT SERPL-MCNC: 1.1 MG/DL (ref 0.76–1.27)
EGFRCR SERPLBLD CKD-EPI 2021: 85 ML/MIN/1.73
ERYTHROCYTE [DISTWIDTH] IN BLOOD BY AUTOMATED COUNT: 14.2 % (ref 11.6–15.4)
GLOBULIN SER CALC-MCNC: 3.3 G/DL (ref 1.5–4.5)
GLUCOSE SERPL-MCNC: 132 MG/DL (ref 70–99)
HBA1C MFR BLD: 6.7 % (ref 4.8–5.6)
HBV SURFACE AB SER QL: REACTIVE
HCT VFR BLD AUTO: 45.3 % (ref 37.5–51)
HDLC SERPL-MCNC: 29 MG/DL
HGB BLD-MCNC: 14.6 G/DL (ref 13–17.7)
LDLC SERPL CALC-MCNC: 178 MG/DL (ref 0–99)
MCH RBC QN AUTO: 28.1 PG (ref 26.6–33)
MCHC RBC AUTO-ENTMCNC: 32.2 G/DL (ref 31.5–35.7)
MCV RBC AUTO: 87 FL (ref 79–97)
PLATELET # BLD AUTO: 339 X10E3/UL (ref 150–450)
POTASSIUM SERPL-SCNC: 4.3 MMOL/L (ref 3.5–5.2)
PROT SERPL-MCNC: 7.3 G/DL (ref 6–8.5)
RBC # BLD AUTO: 5.2 X10E6/UL (ref 4.14–5.8)
SODIUM SERPL-SCNC: 144 MMOL/L (ref 134–144)
TRIGL SERPL-MCNC: 131 MG/DL (ref 0–149)
VLDLC SERPL CALC-MCNC: 24 MG/DL (ref 5–40)
WBC # BLD AUTO: 7.1 X10E3/UL (ref 3.4–10.8)

## 2024-09-06 DIAGNOSIS — G47.10 HYPERSOMNIA WITH SLEEP APNEA: ICD-10-CM

## 2024-09-06 DIAGNOSIS — G47.30 HYPERSOMNIA WITH SLEEP APNEA: ICD-10-CM

## 2024-09-19 ENCOUNTER — TELEPHONE (OUTPATIENT)
Dept: PRIMARY CARE CLINIC | Facility: CLINIC | Age: 44
End: 2024-09-19

## 2024-09-23 RX ORDER — ARMODAFINIL 150 MG/1
150 TABLET ORAL DAILY
Qty: 30 TABLET | Refills: 1 | Status: SHIPPED | OUTPATIENT
Start: 2024-09-23 | End: 2025-03-22

## 2024-09-26 ENCOUNTER — OFFICE VISIT (OUTPATIENT)
Age: 44
End: 2024-09-26
Payer: COMMERCIAL

## 2024-09-26 DIAGNOSIS — E11.65 TYPE 2 DIABETES MELLITUS WITH HYPERGLYCEMIA, WITHOUT LONG-TERM CURRENT USE OF INSULIN (HCC): Primary | ICD-10-CM

## 2024-09-26 PROCEDURE — G0108 DIAB MANAGE TRN  PER INDIV: HCPCS | Performed by: DIETITIAN, REGISTERED

## 2024-10-18 ASSESSMENT — SLEEP AND FATIGUE QUESTIONNAIRES
HOW LIKELY ARE YOU TO NOD OFF OR FALL ASLEEP WHILE SITTING QUIETLY AFTER LUNCH WITHOUT ALCOHOL: SLIGHT CHANCE OF DOZING
HOW LIKELY ARE YOU TO NOD OFF OR FALL ASLEEP WHILE WATCHING TV: MODERATE CHANCE OF DOZING
HOW LIKELY ARE YOU TO NOD OFF OR FALL ASLEEP WHILE SITTING AND TALKING TO SOMEONE: WOULD NEVER DOZE
FOSQ SCORE: 13.5
DO YOU HAVE DIFFICULTY BEING AS ACTIVE AS YOU WANT TO BE IN THE MORNING BECAUSE YOU ARE SLEEPY OR TIRED: YES, MODERATE
HOW LIKELY ARE YOU TO NOD OFF OR FALL ASLEEP WHILE LYING DOWN TO REST IN THE AFTERNOON WHEN CIRCUMSTANCES PERMIT: HIGH CHANCE OF DOZING
DO YOU HAVE DIFFICULTY WATCHING A MOVIE OR VIDEO BECAUSE YOU BECOME SLEEPY OR TIRED: YES, A LITTLE
HOW LIKELY ARE YOU TO NOD OFF OR FALL ASLEEP WHEN YOU ARE A PASSENGER IN A CAR FOR AN HOUR WITHOUT A BREAK: SLIGHT CHANCE OF DOZING
DO YOU HAVE DIFFICULTY OPERATING A MOTOR VEHICLE FOR LONG DISTANCES (GREATER THAN 100 MILES) BECAUSE YOU BECOME SLEEPY: NO
HOW LIKELY ARE YOU TO NOD OFF OR FALL ASLEEP WHILE SITTING AND READING: MODERATE CHANCE OF DOZING
DO YOU HAVE DIFFICULTY OPERATING A MOTOR VEHICLE FOR SHORT DISTANCES (LESS THAN 100 MILES) BECAUSE YOU BECOME SLEEPY: NO
HOW LIKELY ARE YOU TO NOD OFF OR FALL ASLEEP WHILE SITTING INACTIVE IN A PUBLIC PLACE: SLIGHT CHANCE OF DOZING
DO YOU HAVE DIFFICULTY VISITING YOUR FAMILY OR FRIENDS IN THEIR HOME BECAUSE YOU BECOME SLEEPY OR TIRED: YES, MODERATE
HOW LIKELY ARE YOU TO NOD OFF OR FALL ASLEEP WHILE SITTING AND TALKING TO SOMEONE: WOULD NEVER DOZE
HOW LIKELY ARE YOU TO NOD OFF OR FALL ASLEEP IN A CAR, WHILE STOPPED FOR A FEW MINUTES IN TRAFFIC: WOULD NEVER DOZE
HOW LIKELY ARE YOU TO NOD OFF OR FALL ASLEEP WHILE SITTING INACTIVE IN A PUBLIC PLACE: SLIGHT CHANCE OF DOZING
DO YOU HAVE DIFFICULTY BEING AS ACTIVE AS YOU WANT TO BE IN THE EVENING BECAUSE YOU ARE SLEEPY OR TIRED: YES, MODERATE
HOW LIKELY ARE YOU TO NOD OFF OR FALL ASLEEP WHILE WATCHING TV: MODERATE CHANCE OF DOZING
HOW LIKELY ARE YOU TO NOD OFF OR FALL ASLEEP WHILE SITTING QUIETLY AFTER LUNCH WITHOUT ALCOHOL: SLIGHT CHANCE OF DOZING
DO YOU GENERALLY HAVE DIFFICULTY REMEMBERING THINGS BECAUSE YOU ARE SLEEPY OR TIRED: YES, A LITTLE
DO YOU HAVE DIFFICULTY CONCENTRATING ON THE THINGS YOU DO BECAUSE YOU ARE SLEEPY OR TIRED: YES, A LITTLE
HOW LIKELY ARE YOU TO NOD OFF OR FALL ASLEEP WHILE SITTING AND READING: MODERATE CHANCE OF DOZING
HOW LIKELY ARE YOU TO NOD OFF OR FALL ASLEEP IN A CAR, WHILE STOPPED FOR A FEW MINUTES IN TRAFFIC: WOULD NEVER DOZE
HAS YOUR MOOD BEEN AFFECTED BECAUSE YOU ARE SLEEPY OR TIRED: YES, MODERATE
HOW LIKELY ARE YOU TO NOD OFF OR FALL ASLEEP WHEN YOU ARE A PASSENGER IN A CAR FOR AN HOUR WITHOUT A BREAK: SLIGHT CHANCE OF DOZING
HOW LIKELY ARE YOU TO NOD OFF OR FALL ASLEEP WHILE LYING DOWN TO REST IN THE AFTERNOON WHEN CIRCUMSTANCES PERMIT: HIGH CHANCE OF DOZING
ESS TOTAL SCORE: 10
HAS YOUR RELATIONSHIP WITH FAMILY, FRIENDS OR WORK COLLEAGUES BEEN AFFECTED BECAUSE YOU ARE SLEEPY OR TIRED: YES, MODERATE

## 2024-10-21 ENCOUNTER — OFFICE VISIT (OUTPATIENT)
Age: 44
End: 2024-10-21
Payer: COMMERCIAL

## 2024-10-21 VITALS
BODY MASS INDEX: 49.44 KG/M2 | HEART RATE: 92 BPM | DIASTOLIC BLOOD PRESSURE: 116 MMHG | WEIGHT: 315 LBS | HEIGHT: 67 IN | OXYGEN SATURATION: 97 % | SYSTOLIC BLOOD PRESSURE: 179 MMHG

## 2024-10-21 DIAGNOSIS — G47.33 OSA (OBSTRUCTIVE SLEEP APNEA): Primary | ICD-10-CM

## 2024-10-21 DIAGNOSIS — I10 PRIMARY HYPERTENSION: ICD-10-CM

## 2024-10-21 PROCEDURE — 99215 OFFICE O/P EST HI 40 MIN: CPT | Performed by: INTERNAL MEDICINE

## 2024-10-21 PROCEDURE — 3077F SYST BP >= 140 MM HG: CPT | Performed by: INTERNAL MEDICINE

## 2024-10-21 PROCEDURE — 3080F DIAST BP >= 90 MM HG: CPT | Performed by: INTERNAL MEDICINE

## 2024-10-21 ASSESSMENT — SLEEP AND FATIGUE QUESTIONNAIRES
HOW LIKELY ARE YOU TO NOD OFF OR FALL ASLEEP WHEN YOU ARE A PASSENGER IN A CAR FOR AN HOUR WITHOUT A BREAK: SLIGHT CHANCE OF DOZING
HOW LIKELY ARE YOU TO NOD OFF OR FALL ASLEEP WHILE SITTING AND TALKING TO SOMEONE: WOULD NEVER DOZE
ESS TOTAL SCORE: 1
HOW LIKELY ARE YOU TO NOD OFF OR FALL ASLEEP WHILE SITTING INACTIVE IN A PUBLIC PLACE: WOULD NEVER DOZE
HOW LIKELY ARE YOU TO NOD OFF OR FALL ASLEEP WHILE LYING DOWN TO REST IN THE AFTERNOON WHEN CIRCUMSTANCES PERMIT: WOULD NEVER DOZE
HOW LIKELY ARE YOU TO NOD OFF OR FALL ASLEEP WHILE SITTING QUIETLY AFTER LUNCH WITHOUT ALCOHOL: WOULD NEVER DOZE
HOW LIKELY ARE YOU TO NOD OFF OR FALL ASLEEP WHILE WATCHING TV: WOULD NEVER DOZE
HOW LIKELY ARE YOU TO NOD OFF OR FALL ASLEEP IN A CAR, WHILE STOPPED FOR A FEW MINUTES IN TRAFFIC: WOULD NEVER DOZE
HOW LIKELY ARE YOU TO NOD OFF OR FALL ASLEEP WHILE SITTING AND READING: WOULD NEVER DOZE

## 2024-10-21 NOTE — PATIENT INSTRUCTIONS
[address]  [place] [address]  Tel.  [phone]  Fax. [phone] [address]  [place] [address]  Tel.  [phone]  Fax. [phone] [address] [place] [address]  Tel.  [phone]  Fax. [phone]     Learning About CPAP for Sleep Apnea  What is CPAP?              CPAP is a small machine that you use at home every night while you sleep. It increases air pressure in your throat to keep your airway open. When you have sleep apnea, this can help you sleep better so you feel much better. CPAP stands for \"continuous positive airway pressure.\"  The CPAP machine will have one of the following:  A mask that covers your nose and mouth  Prongs that fit into your nose  A mask that covers your nose only, the most common type. This type is called NCPAP. The N stands for \"nasal.\"  Why is it done?  CPAP is usually the best treatment for obstructive sleep apnea. It is the first treatment choice and the most widely used. Your doctor may suggest CPAP if you have:  Moderate to severe sleep apnea.  Sleep apnea and coronary artery disease (CAD) or heart failure.  How does it help?  CPAP can help you have more normal sleep, so you feel less sleepy and more alert during the daytime.  CPAP may help keep heart failure or other heart problems from getting worse.  NCPAP may help lower your blood pressure.  If you use CPAP, your bed partner may also sleep better because you are not snoring or restless.  What are the side effects?  Some people who use CPAP have:  A dry or stuffy nose and a sore throat.  Irritated skin on the face.  Sore eyes.  Bloating.  If you have any of these problems, work with your doctor to fix them. Here are some things you can try:  Be sure the mask or nasal prongs fit well.  See if your doctor can adjust the pressure of your CPAP.  If your nose is dry, try a humidifier.  If your nose is runny or stuffy, try decongestant medicine or a steroid

## 2024-10-21 NOTE — PROGRESS NOTES
monitoring application, is willing to track therapy and agrees to notify use if AHI is >5 per hour.    * Counseling was provided regarding the importance of regular PAP use with emphasis on ensuring sufficient total sleep time, proper sleep hygiene, and safe driving.    * Re-enforced proper and regular cleaning for the device.    * He was asked to contact our office for any problems regarding PAP therapy.      3. Recommended a dedicated weight loss program through appropriate diet and exercise regimen as significant weight reduction has been shown to reduce severity of obstructive sleep apnea.     Return for follow-up after testing is completed.       SUBJECTIVE/OBJECTIVE:    He  is seen today for follow up on PAP device and reports no problems using the device.   The following concerns identified:    Drowsiness no Problems exhaling no   Snoring no Forget to put on no   Mask Comfortable yes Can't fall asleep no   Dry Mouth no Mask falls off no   Air Leaking no Frequent awakenings no       He admits that his sleep has improved on PAP therapy using FF mask and heated tubing.  Thank he is currently taking armodafinil 150 mg daily.    Review of device download indicated:    Usage Days >= 4 hours 100 %  Median Usage  7 hour 41 minutes    Median Device Pressure 21.1 / 15.1 cmH2O  Device Pressure 95% 23.1 / 17.2 cmH2O    Median Leak 0.0 L/min  95% Leak 0.0 L/min    Average AHI: 3.9 /hr which reflects improved sleep breathing condition.    [place] Sleepiness Score: 1   Modified F.O.S.Q. Score Total / 2: (P) 13.5       Sleep Review of Systems: notable for Negative difficulty falling asleep; Negative awakenings at night; Negative  early morning headaches; Negative  memory problems; Negative  concentration issues; Negative  chest pain; Negative  shortness of breath;  Negative rashes or itching; Negative heartburn / belching / flatulence; Negative  significant mood issues; no afternoon naps per week.         BP (!) 179/116

## 2024-11-07 ENCOUNTER — TELEMEDICINE (OUTPATIENT)
Age: 44
End: 2024-11-07
Payer: COMMERCIAL

## 2024-11-07 DIAGNOSIS — E11.65 TYPE 2 DIABETES MELLITUS WITH HYPERGLYCEMIA, WITHOUT LONG-TERM CURRENT USE OF INSULIN (HCC): Primary | ICD-10-CM

## 2024-11-07 PROCEDURE — G0108 DIAB MANAGE TRN  PER INDIV: HCPCS | Performed by: DIETITIAN, REGISTERED

## 2024-11-13 NOTE — PROGRESS NOTES
[facility]  Diabetes Self-Management Education & Support Program  Encounter Note      SUMMARY  Diabetes self-care management training was completed related to post program follow up.    EVALUATION:  Dimitri shared that he has been implementing some changes since our last visit. Shared that his no longer snacking and eating meals consistently with appetite but managed. Dimitri shared that he has been strongly encouraged by his manager to prioritize his health and to \"out of office\" himself for his lunch so that he can eat and do some activity. Continues to make slow progressive weight loss.     RECOMMENDATIONS:  1) prioritize your activity - take advantage of :gift of time\".  2) consider if the [employer] beverage is the best options when comparing activity to \"work it off\"  3) plan for complex carbs along with vegetables at your meal for 60g/meal  4) watch high fat foods      TOPICS DISCUSSED TODAY:  WHAT CAN I EAT? 30  HOW DOES PHYSICAL ACTIVITY AFFECT MY DIABETES? 30      Next provider visit is scheduled for 11/15/24       DATE DSMES TOPIC EVALUATION     11/7/24 WHAT CAN I EAT?   General principles   Determining a healthy weight   Nutritional terms & tools   Healthy Plate method   Carbohydrate Counting   Reading food labels   Free apps      Praised Dimitri for the following changes:  no regular soda - using Clearly [place] and Coke Zero; including vegetables with each meal and will use for a snack if needed. He is using air fryer for preparing food at home. Making sandwiches at home and will still have his soup with this meal.   Today we discussed food choices and how adding foods with high amounts of fat contributes to higher kcal intakes. Reviewed planning for consistent carbohydrates and adding lower kcal side dishes to meals. Instructed on how to manage holiday eating.  Shared that he is having medium McCafe Frappe and agrees to consult the nutrition information and then decide \"is

## 2024-11-14 SDOH — ECONOMIC STABILITY: INCOME INSECURITY: HOW HARD IS IT FOR YOU TO PAY FOR THE VERY BASICS LIKE FOOD, HOUSING, MEDICAL CARE, AND HEATING?: NOT HARD AT ALL

## 2024-11-14 SDOH — ECONOMIC STABILITY: FOOD INSECURITY: WITHIN THE PAST 12 MONTHS, THE FOOD YOU BOUGHT JUST DIDN'T LAST AND YOU DIDN'T HAVE MONEY TO GET MORE.: NEVER TRUE

## 2024-11-14 SDOH — ECONOMIC STABILITY: FOOD INSECURITY: WITHIN THE PAST 12 MONTHS, YOU WORRIED THAT YOUR FOOD WOULD RUN OUT BEFORE YOU GOT MONEY TO BUY MORE.: NEVER TRUE

## 2024-11-15 ENCOUNTER — OFFICE VISIT (OUTPATIENT)
Dept: PRIMARY CARE CLINIC | Facility: CLINIC | Age: 44
End: 2024-11-15
Payer: COMMERCIAL

## 2024-11-15 VITALS
TEMPERATURE: 97 F | OXYGEN SATURATION: 95 % | HEIGHT: 67 IN | HEART RATE: 90 BPM | RESPIRATION RATE: 20 BRPM | SYSTOLIC BLOOD PRESSURE: 135 MMHG | DIASTOLIC BLOOD PRESSURE: 84 MMHG | BODY MASS INDEX: 49.44 KG/M2 | WEIGHT: 315 LBS

## 2024-11-15 DIAGNOSIS — I15.2 HYPERTENSION ASSOCIATED WITH DIABETES (HCC): ICD-10-CM

## 2024-11-15 DIAGNOSIS — E66.01 MORBIDLY OBESE: ICD-10-CM

## 2024-11-15 DIAGNOSIS — E11.59 HYPERTENSION ASSOCIATED WITH DIABETES (HCC): ICD-10-CM

## 2024-11-15 DIAGNOSIS — F33.1 MAJOR DEPRESSIVE DISORDER, RECURRENT, MODERATE (HCC): ICD-10-CM

## 2024-11-15 DIAGNOSIS — E11.9 TYPE 2 DIABETES MELLITUS WITHOUT COMPLICATION, WITHOUT LONG-TERM CURRENT USE OF INSULIN (HCC): Primary | ICD-10-CM

## 2024-11-15 PROCEDURE — 3044F HG A1C LEVEL LT 7.0%: CPT | Performed by: INTERNAL MEDICINE

## 2024-11-15 PROCEDURE — 3079F DIAST BP 80-89 MM HG: CPT | Performed by: INTERNAL MEDICINE

## 2024-11-15 PROCEDURE — 3075F SYST BP GE 130 - 139MM HG: CPT | Performed by: INTERNAL MEDICINE

## 2024-11-15 PROCEDURE — 99214 OFFICE O/P EST MOD 30 MIN: CPT | Performed by: INTERNAL MEDICINE

## 2024-11-15 RX ORDER — LOSARTAN POTASSIUM AND HYDROCHLOROTHIAZIDE 12.5; 5 MG/1; MG/1
1 TABLET ORAL DAILY
Qty: 90 TABLET | Refills: 1 | Status: SHIPPED | OUTPATIENT
Start: 2024-11-15

## 2024-11-15 RX ORDER — NEBIVOLOL 5 MG/1
5 TABLET ORAL DAILY
Qty: 90 TABLET | Refills: 1 | Status: SHIPPED | OUTPATIENT
Start: 2024-11-15

## 2024-11-15 RX ORDER — TIRZEPATIDE 7.5 MG/.5ML
7.5 INJECTION, SOLUTION SUBCUTANEOUS
Qty: 3 ML | Refills: 1 | Status: SHIPPED | OUTPATIENT
Start: 2024-11-15 | End: 2025-02-13

## 2024-11-15 ASSESSMENT — COLUMBIA-SUICIDE SEVERITY RATING SCALE - C-SSRS
1. WITHIN THE PAST MONTH, HAVE YOU WISHED YOU WERE DEAD OR WISHED YOU COULD GO TO SLEEP AND NOT WAKE UP?: YES
6. HAVE YOU EVER DONE ANYTHING, STARTED TO DO ANYTHING, OR PREPARED TO DO ANYTHING TO END YOUR LIFE?: NO

## 2024-11-15 ASSESSMENT — PATIENT HEALTH QUESTIONNAIRE - PHQ9
SUM OF ALL RESPONSES TO PHQ9 QUESTIONS 1 & 2: 6
SUM OF ALL RESPONSES TO PHQ QUESTIONS 1-9: 16
5. POOR APPETITE OR OVEREATING: MORE THAN HALF THE DAYS
9. THOUGHTS THAT YOU WOULD BE BETTER OFF DEAD, OR OF HURTING YOURSELF: SEVERAL DAYS
3. TROUBLE FALLING OR STAYING ASLEEP: MORE THAN HALF THE DAYS
SUM OF ALL RESPONSES TO PHQ QUESTIONS 1-9: 17
SUM OF ALL RESPONSES TO PHQ QUESTIONS 1-9: 17
8. MOVING OR SPEAKING SO SLOWLY THAT OTHER PEOPLE COULD HAVE NOTICED. OR THE OPPOSITE, BEING SO FIGETY OR RESTLESS THAT YOU HAVE BEEN MOVING AROUND A LOT MORE THAN USUAL: NOT AT ALL
7. TROUBLE CONCENTRATING ON THINGS, SUCH AS READING THE NEWSPAPER OR WATCHING TELEVISION: MORE THAN HALF THE DAYS
4. FEELING TIRED OR HAVING LITTLE ENERGY: MORE THAN HALF THE DAYS
1. LITTLE INTEREST OR PLEASURE IN DOING THINGS: NEARLY EVERY DAY
6. FEELING BAD ABOUT YOURSELF - OR THAT YOU ARE A FAILURE OR HAVE LET YOURSELF OR YOUR FAMILY DOWN: MORE THAN HALF THE DAYS
SUM OF ALL RESPONSES TO PHQ QUESTIONS 1-9: 17
10. IF YOU CHECKED OFF ANY PROBLEMS, HOW DIFFICULT HAVE THESE PROBLEMS MADE IT FOR YOU TO DO YOUR WORK, TAKE CARE OF THINGS AT HOME, OR GET ALONG WITH OTHER PEOPLE: EXTREMELY DIFFICULT
2. FEELING DOWN, DEPRESSED OR HOPELESS: NEARLY EVERY DAY

## 2024-11-15 NOTE — PROGRESS NOTES
\"Have you been to the ER, urgent care clinic since your last visit?  Hospitalized since your last visit?\"    NO      “Have you seen or consulted any other health care providers outside our system since your last visit?”    Eye doctor       Chief Complaint   Patient presents with    Follow-up     On diabetes.     Depression       Pt did not want resources for a therapist.

## 2024-11-15 NOTE — PROGRESS NOTES
Dimitri Cabrales (:  1980) is a 44 y.o. male,Established patient, here for evaluation of the following chief complaint(s):  Follow-up (On diabetes. ) and Depression    History of Present Illness  The patient is a 4-year-old male seen today for a follow-up on diabetes and depression.    He is currently on Bystolic, losartan, and hydrochlorothiazide for blood pressure management. He was previously on Synjardy, but he discontinued. He is also taking Ozempic, but has not experienced significant weight loss. He continues to experience cravings. He is working with a diabetic educator and reports positive progress.    He discontinued Trintellix for approximately a month, during which he felt well and initiated a workout regimen three times a week. However, following the election, he experienced anxiety and resumed Trintellix, which has been beneficial. He occasionally forgets to take it in the morning. He is also on Rexulti, which has been effective. He is employed at an insurance company where monthly layoffs occur, causing him concern about potential changes to the affordable care act that could affect his healthcare due to his pre-existing conditions. He is under the care of a psychiatrist and has communicated his medication discontinuation to her. His prescriptions for Trintellix and Rexulti have been refilled and he has been taking them for a week. His next appointment with the psychiatrist is scheduled for 2024. He has not taken armodafinil since starting his workout routine, which he did not continue this past week. He believes his depression increases his desire to sleep.     Physical Exam  /84 (Site: Left Upper Arm, Position: Sitting)   Pulse 90   Temp 97 °F (36.1 °C) (Temporal)   Resp 20   Ht 1.702 m (5' 7\")   Wt (!) 146.5 kg (323 lb)   SpO2 95%   BMI 50.59 kg/m²      Vitals and nursing note reviewed.   Constitutional:       Appearance: Normal appearance.  Morbidly Obese   Eyes:

## 2024-11-21 ENCOUNTER — TELEPHONE (OUTPATIENT)
Dept: PRIMARY CARE CLINIC | Facility: CLINIC | Age: 44
End: 2024-11-21

## 2024-11-21 NOTE — TELEPHONE ENCOUNTER
Prior Auth started for - Mounjaro  Key number- [id number]     Called and  notified the patient that this is in process as well

## 2024-11-21 NOTE — TELEPHONE ENCOUNTER
Pt is calling because his insurance is requiring a PA for   Tirzepatide (MOUNJARO) 7.5 MG/0.5ML SOAJ    He requested for a nurse to call him

## 2024-11-27 DIAGNOSIS — E11.8 DIABETES MELLITUS WITH COMPLICATION (HCC): ICD-10-CM

## 2024-11-27 NOTE — TELEPHONE ENCOUNTER
Patient is worried because he is supposed to take his Ozempic on Saturday and the request just came in today.  He is asking to talk to a nurse.

## 2024-11-29 DIAGNOSIS — G47.10 HYPERSOMNIA WITH SLEEP APNEA: ICD-10-CM

## 2024-11-29 DIAGNOSIS — G47.30 HYPERSOMNIA WITH SLEEP APNEA: ICD-10-CM

## 2024-12-01 RX ORDER — SEMAGLUTIDE 2.68 MG/ML
INJECTION, SOLUTION SUBCUTANEOUS
Qty: 3 ML | Refills: 1 | Status: SHIPPED | OUTPATIENT
Start: 2024-12-01

## 2024-12-04 RX ORDER — ARMODAFINIL 150 MG/1
150 TABLET ORAL DAILY
Qty: 30 TABLET | Refills: 1 | Status: SHIPPED | OUTPATIENT
Start: 2024-12-04 | End: 2025-06-02

## 2024-12-04 NOTE — TELEPHONE ENCOUNTER
Orders Placed This Encounter    Armodafinil (NUVIGIL) 150 MG TABS tablet     Sig: Take 1 tablet by mouth daily for 180 days. Max Daily Amount: 150 mg     Dispense:  30 tablet     Refill:  1     Not to exceed 4 additional fills before 03/22/2025

## 2024-12-23 DIAGNOSIS — E11.9 TYPE 2 DIABETES MELLITUS WITHOUT COMPLICATION, WITHOUT LONG-TERM CURRENT USE OF INSULIN (HCC): Primary | ICD-10-CM

## 2024-12-23 RX ORDER — GLUCOSAMINE HCL/CHONDROITIN SU 500-400 MG
CAPSULE ORAL
Qty: 100 STRIP | Refills: 1 | Status: SHIPPED | OUTPATIENT
Start: 2024-12-23

## 2024-12-23 NOTE — TELEPHONE ENCOUNTER
Pt is calling requesting refill on his Lantus and his strips. His Lantus is under his discontinued medication list. I let the pt know that I will reach out to the nurses and see why it was discontinued. He would like for it to be sent to -  [id number] - [place], [place] - [address] -  [phone] - F [phone]

## 2024-12-24 DIAGNOSIS — E11.9 TYPE 2 DIABETES MELLITUS WITHOUT COMPLICATION, WITHOUT LONG-TERM CURRENT USE OF INSULIN (HCC): Primary | ICD-10-CM

## 2024-12-24 RX ORDER — LANCETS 30 GAUGE
1 EACH MISCELLANEOUS DAILY
Qty: 100 EACH | Refills: 2 | Status: SHIPPED | OUTPATIENT
Start: 2024-12-24

## 2024-12-24 NOTE — TELEPHONE ENCOUNTER
Spoke to pt and he needed one touch brand test strips. I asked if he could go back to pharmacy and ask for the correct ones, he said yes and I will sent lancets script to Dr. Sabillon to fill. She will probably refill today or tomorrow. He said ok, thank you.

## 2024-12-24 NOTE — TELEPHONE ENCOUNTER
Patient picked up test strips yesterday that aren't the same as he had before so he is confused if they will work with his current monitor.  Also, he needs lancets and he did not get those yesterday.

## 2025-01-06 ENCOUNTER — TELEMEDICINE (OUTPATIENT)
Age: 45
End: 2025-01-06
Payer: COMMERCIAL

## 2025-01-06 DIAGNOSIS — E11.65 TYPE 2 DIABETES MELLITUS WITH HYPERGLYCEMIA, WITHOUT LONG-TERM CURRENT USE OF INSULIN (HCC): Primary | ICD-10-CM

## 2025-01-06 PROCEDURE — G0108 DIAB MANAGE TRN  PER INDIV: HCPCS | Performed by: DIETITIAN, REGISTERED

## 2025-01-06 NOTE — PROGRESS NOTES
[facility]  Diabetes Self-Management Education & Support Program  Encounter Note      SUMMARY  Diabetes self-care management training was completed related to post program follow up. Dimitri and I will meet again 2/4/25       EVALUATION:  Dimitri shared that he has relapsed losing habits he had built since our last visit in . He is not active and feels this along with larger portions of side dishes has increased his weight since mid November. Shared that he managed well with food choices over the holiday season.   Also shared that he has been missing medications and some have been restarted or changed related to to sleep and depression management. Feeling tired and this contributes to his decrease in activity.     RECOMMENDATIONS:  1) use measuring tools and revisit portions of sides dishes - feels they increase.with dinner meals.  2) return to using [employer]   3) find a place to \"put medication needed in AM (armodafinil) to help with \"wakefulness\"     TOPICS DISCUSSED TODAY:  HOW DO I FIGURE OUT WHAT'S INFLUENCING MY BLOOD GLUCOSES? 30    Next provider visit is to be scheduled.       DATE DSMES TOPIC EVALUATION     1/6/2025 WHAT CAN I EAT?   General principles   Determining a healthy weight   Nutritional terms & tools   Healthy Plate method   Carbohydrate Counting   Reading food labels   Free apps         The participant   Uses Healthy Plate principles in constructing meals: Yes  Reads food labels in choosing acceptable foods: Yes    We reviewed his habits as he shared   Ordering in 3-4 times over 5 day per periods.  Weekends are stable with eating home.   Shared that his sides are increasing - carb values. Would like to re-visit measuring his portions as he found to helpful towards weight loss     DATE DSMES TOPIC EVALUATION     1/6/2025 HOW DOES PHYSICAL ACTIVITY AFFECT MY DIABETES?   Benefits of physical activity   Beginning a program of physical activity   Walking

## 2025-01-23 ENCOUNTER — TELEPHONE (OUTPATIENT)
Dept: PRIMARY CARE CLINIC | Facility: CLINIC | Age: 45
End: 2025-01-23

## 2025-01-23 NOTE — TELEPHONE ENCOUNTER
Appointment Request From: Dimitri Cabrales     With Provider: Dr. Nichelle Sabillon MD [[facility]]     Preferred Date Range: 1/27/2025 - 1/31/2025     Preferred Times: Any Time     Reason for visit: Request an Appointment     Comments:  Diabetes check up and bloodwork.

## 2025-01-24 DIAGNOSIS — E11.8 DIABETES MELLITUS WITH COMPLICATION (HCC): ICD-10-CM

## 2025-01-24 RX ORDER — SEMAGLUTIDE 2.68 MG/ML
INJECTION, SOLUTION SUBCUTANEOUS
Qty: 3 ML | Refills: 1 | Status: SHIPPED | OUTPATIENT
Start: 2025-01-24

## 2025-02-03 SDOH — ECONOMIC STABILITY: FOOD INSECURITY: WITHIN THE PAST 12 MONTHS, YOU WORRIED THAT YOUR FOOD WOULD RUN OUT BEFORE YOU GOT MONEY TO BUY MORE.: NEVER TRUE

## 2025-02-03 SDOH — ECONOMIC STABILITY: TRANSPORTATION INSECURITY
IN THE PAST 12 MONTHS, HAS THE LACK OF TRANSPORTATION KEPT YOU FROM MEDICAL APPOINTMENTS OR FROM GETTING MEDICATIONS?: NO

## 2025-02-03 SDOH — ECONOMIC STABILITY: FOOD INSECURITY: WITHIN THE PAST 12 MONTHS, THE FOOD YOU BOUGHT JUST DIDN'T LAST AND YOU DIDN'T HAVE MONEY TO GET MORE.: NEVER TRUE

## 2025-02-03 SDOH — ECONOMIC STABILITY: INCOME INSECURITY: IN THE LAST 12 MONTHS, WAS THERE A TIME WHEN YOU WERE NOT ABLE TO PAY THE MORTGAGE OR RENT ON TIME?: NO

## 2025-02-04 ENCOUNTER — TELEMEDICINE (OUTPATIENT)
Age: 45
End: 2025-02-04

## 2025-02-04 DIAGNOSIS — E11.65 TYPE 2 DIABETES MELLITUS WITH HYPERGLYCEMIA, WITHOUT LONG-TERM CURRENT USE OF INSULIN (HCC): Primary | ICD-10-CM

## 2025-02-04 NOTE — PROGRESS NOTES
Pt connected with educator but needed to leave appointment.  Appointment was rescheduled for 2/27/25.    Tanisha Berman RD [facility]  Program for Diabetes [facility]/[phone]

## 2025-02-05 ENCOUNTER — OFFICE VISIT (OUTPATIENT)
Dept: PRIMARY CARE CLINIC | Facility: CLINIC | Age: 45
End: 2025-02-05
Payer: COMMERCIAL

## 2025-02-05 VITALS
RESPIRATION RATE: 20 BRPM | TEMPERATURE: 97 F | DIASTOLIC BLOOD PRESSURE: 92 MMHG | HEIGHT: 67 IN | BODY MASS INDEX: 49.44 KG/M2 | SYSTOLIC BLOOD PRESSURE: 148 MMHG | HEART RATE: 89 BPM | OXYGEN SATURATION: 98 % | WEIGHT: 315 LBS

## 2025-02-05 DIAGNOSIS — E11.8 DIABETES MELLITUS WITH COMPLICATION (HCC): Primary | ICD-10-CM

## 2025-02-05 DIAGNOSIS — E78.2 MIXED HYPERLIPIDEMIA: ICD-10-CM

## 2025-02-05 DIAGNOSIS — E11.59 HYPERTENSION ASSOCIATED WITH DIABETES (HCC): ICD-10-CM

## 2025-02-05 DIAGNOSIS — I15.2 HYPERTENSION ASSOCIATED WITH DIABETES (HCC): ICD-10-CM

## 2025-02-05 DIAGNOSIS — F33.1 MAJOR DEPRESSIVE DISORDER, RECURRENT, MODERATE (HCC): ICD-10-CM

## 2025-02-05 DIAGNOSIS — E11.8 DIABETES MELLITUS WITH COMPLICATION (HCC): ICD-10-CM

## 2025-02-05 DIAGNOSIS — E66.01 MORBIDLY OBESE: ICD-10-CM

## 2025-02-05 PROCEDURE — 3077F SYST BP >= 140 MM HG: CPT | Performed by: INTERNAL MEDICINE

## 2025-02-05 PROCEDURE — 3079F DIAST BP 80-89 MM HG: CPT | Performed by: INTERNAL MEDICINE

## 2025-02-05 PROCEDURE — 99214 OFFICE O/P EST MOD 30 MIN: CPT | Performed by: INTERNAL MEDICINE

## 2025-02-05 RX ORDER — METFORMIN HYDROCHLORIDE 750 MG/1
750 TABLET, EXTENDED RELEASE ORAL
Qty: 90 TABLET | Refills: 1 | Status: SHIPPED | OUTPATIENT
Start: 2025-02-05

## 2025-02-05 RX ORDER — CARIPRAZINE 1.5 MG/1
1.5 CAPSULE, GELATIN COATED ORAL DAILY
COMMUNITY
Start: 2025-01-22

## 2025-02-05 ASSESSMENT — PATIENT HEALTH QUESTIONNAIRE - PHQ9
2. FEELING DOWN, DEPRESSED OR HOPELESS: SEVERAL DAYS
SUM OF ALL RESPONSES TO PHQ QUESTIONS 1-9: 1
1. LITTLE INTEREST OR PLEASURE IN DOING THINGS: NOT AT ALL
SUM OF ALL RESPONSES TO PHQ QUESTIONS 1-9: 1
SUM OF ALL RESPONSES TO PHQ9 QUESTIONS 1 & 2: 1

## 2025-02-05 NOTE — PROGRESS NOTES
\"Have you been to the ER, urgent care clinic since your last visit?  Hospitalized since your last visit?\"    NO      “Have you seen or consulted any other health care providers outside our system since your last visit?”    NO      Chief Complaint   Patient presents with    Follow-up     Diabetes.        Pt is ok with scribe.

## 2025-02-05 NOTE — PROGRESS NOTES
Dimitri Cabrales (:  1980) is a 45 y.o. male,Established patient, here for evaluation of the following chief complaint(s):  Follow-up (Diabetes. )      [facility]  [address] [id number]    History of Present Illness  The patient presents for evaluation of diabetes, hypertension, hyperlipidemia, and depression.    He has not been monitoring her blood glucose levels. He attempted to switch to Mounjaro, but due to insurance approval delays, he missed the scheduled start date and had to continue with Ozempic. He is uncertain about her insurance coverage for Mounjaro. He reports weight gain and persistent hunger, even after the holiday season. He experiences nausea on the days she takes Ozempic, which she manages by eating with the medication. He also reports abdominal cramps associated with Ozempic, but not with metformin. He has discontinued Synjardy since 2023. He has no adverse reactions to metformin. His current regimen includes Ozempic, but he reports it does not aid in appetite control. He is also on metformin, which was not refilled, and Synjardy, which was also not refilled.    He is currently under the care of a psychiatrist for her depression. He has recently started Vraylar and has discontinued Rexulti. He is also on Trintellix 20 mg.    He is on atorvastatin for cholesterol management, although she occasionally misses doses. He reports experiencing nausea, but is unsure if it is a side effect of the atorvastatin or one of her other medications.    He is on Bystolic for hypertension, which she takes in the morning. He did not take losartan today due to a shortage and admits to struggling with adherence to multiple daily medications.    MEDICATIONS  Current: Ozempic, atorvastatin, nebivolol, losartan, Trintellix, Vraylar  Discontinued: Synjardy, Rexulti  Current Outpatient Medications   Medication Sig Dispense Refill

## 2025-02-08 DIAGNOSIS — E11.8 DIABETES MELLITUS WITH COMPLICATION (HCC): Primary | ICD-10-CM

## 2025-02-08 LAB
ALBUMIN SERPL-MCNC: 4.1 G/DL (ref 4.1–5.1)
ALP SERPL-CCNC: 117 IU/L (ref 44–121)
ALT SERPL-CCNC: 24 IU/L (ref 0–44)
AST SERPL-CCNC: 28 IU/L (ref 0–40)
BILIRUB SERPL-MCNC: 0.6 MG/DL (ref 0–1.2)
BUN SERPL-MCNC: 8 MG/DL (ref 6–24)
BUN/CREAT SERPL: 7 (ref 9–20)
CALCIUM SERPL-MCNC: 9.4 MG/DL (ref 8.7–10.2)
CHLORIDE SERPL-SCNC: 105 MMOL/L (ref 96–106)
CHOLEST SERPL-MCNC: 196 MG/DL (ref 100–199)
CO2 SERPL-SCNC: 24 MMOL/L (ref 20–29)
CREAT SERPL-MCNC: 1.16 MG/DL (ref 0.76–1.27)
EGFRCR SERPLBLD CKD-EPI 2021: 79 ML/MIN/1.73
GLOBULIN SER CALC-MCNC: 3.2 G/DL (ref 1.5–4.5)
GLUCOSE SERPL-MCNC: 148 MG/DL (ref 70–99)
HBA1C MFR BLD: 7 % (ref 4.8–5.6)
HDLC SERPL-MCNC: 30 MG/DL
LDLC SERPL CALC-MCNC: 142 MG/DL (ref 0–99)
POTASSIUM SERPL-SCNC: 4 MMOL/L (ref 3.5–5.2)
PROT SERPL-MCNC: 7.3 G/DL (ref 6–8.5)
SODIUM SERPL-SCNC: 145 MMOL/L (ref 134–144)
TRIGL SERPL-MCNC: 130 MG/DL (ref 0–149)
VLDLC SERPL CALC-MCNC: 24 MG/DL (ref 5–40)

## 2025-02-08 RX ORDER — TIRZEPATIDE 5 MG/.5ML
5 INJECTION, SOLUTION SUBCUTANEOUS WEEKLY
Qty: 2 ML | Refills: 0 | Status: SHIPPED | OUTPATIENT
Start: 2025-02-08 | End: 2025-03-10

## 2025-02-27 ENCOUNTER — TELEMEDICINE (OUTPATIENT)
Age: 45
End: 2025-02-27
Payer: COMMERCIAL

## 2025-02-27 DIAGNOSIS — E11.65 TYPE 2 DIABETES MELLITUS WITH HYPERGLYCEMIA, WITHOUT LONG-TERM CURRENT USE OF INSULIN (HCC): Primary | ICD-10-CM

## 2025-02-27 PROCEDURE — G0108 DIAB MANAGE TRN  PER INDIV: HCPCS | Performed by: DIETITIAN, REGISTERED

## 2025-02-27 NOTE — PROGRESS NOTES
[facility]  Diabetes Self-Management Education & Support Program  Encounter Note      SUMMARY  Diabetes self-care management training was completed related to post program diabetes education and follow up education for weight loss.     EVALUATION:  Dimitri shared that he started Mounjaro - reports 7 lb weight loss and has taken 2 doses so far. Finds that he is experiencing prolonged hunger cues lingering post meal.   Dimitri shared that he has started taking walks:10-15 minutes daily. Also has been working on home projects that require increased movement/activity - decreasing his sedentary time from 6 hours to 2 hours most evenings. He shared that he is in a better mood and finds his depression is better.   Reports following dietary changes - gave up soda and is using \"liquid death\" sparkling water with 4g carb 20 kcal per serving.  Also no longer choosing fries but baked potato with chives (no added fat). He is no longer snacking/eating after 8 pm. We discussed that these changes can contribute to 1/2 lb weight loss/week.     RECOMMENDATIONS:  1) continue to work on small changes - maintain healthy food replacements.   2) continue to expand exercise - focus on small amounts     Next provider visit is pending       SMART GOAL(S)  Build in resistance bands for 10 minutes twice a day and 5 minutes total warm up and cool down stretching  ACHIEVEMENT OF GOAL(S) : 0-24% - set today       DATE DSMES TOPIC EVALUATION     2/27/2025 HOW DO I FIGURE OUT WHAT'S INFLUENCING MY BLOOD GLUCOSES?   Problem solving using SOAR   Set goals   Sort options   Arrive at a plan   Reaffirm plan    Dimitri shared that he is currently not testing his BG due to not having lancets. Plans to follow up with his pharmacy to see if available for  to begin testing to better assess how his new interventions are contributing to his BG.   Reports slight regression and had eating sweets again - brownies/cookies but

## 2025-03-04 DIAGNOSIS — G47.10 HYPERSOMNIA WITH SLEEP APNEA: ICD-10-CM

## 2025-03-04 DIAGNOSIS — G47.30 HYPERSOMNIA WITH SLEEP APNEA: ICD-10-CM

## 2025-03-07 ENCOUNTER — TELEPHONE (OUTPATIENT)
Age: 45
End: 2025-03-07

## 2025-03-07 DIAGNOSIS — G47.30 HYPERSOMNIA WITH SLEEP APNEA: Primary | ICD-10-CM

## 2025-03-07 DIAGNOSIS — G47.10 HYPERSOMNIA WITH SLEEP APNEA: Primary | ICD-10-CM

## 2025-03-07 RX ORDER — ARMODAFINIL 150 MG/1
150 TABLET ORAL DAILY
Qty: 30 TABLET | Refills: 5 | Status: SHIPPED | OUTPATIENT
Start: 2025-03-07 | End: 2025-09-03

## 2025-03-07 NOTE — TELEPHONE ENCOUNTER
Orders Placed This Encounter    Armodafinil (NUVIGIL) 150 MG TABS tablet     Sig: Take 1 tablet by mouth daily for 180 days. Max Daily Amount: 150 mg     Dispense:  30 tablet     Refill:  5

## 2025-03-11 RX ORDER — ARMODAFINIL 150 MG/1
150 TABLET ORAL DAILY
Qty: 30 TABLET | Refills: 1 | OUTPATIENT
Start: 2025-03-11 | End: 2025-09-07

## 2025-03-12 DIAGNOSIS — E11.8 DIABETES MELLITUS WITH COMPLICATION (HCC): ICD-10-CM

## 2025-03-13 RX ORDER — TIRZEPATIDE 5 MG/.5ML
5 INJECTION, SOLUTION SUBCUTANEOUS
Qty: 2 ML | Refills: 1 | Status: SHIPPED | OUTPATIENT
Start: 2025-03-13 | End: 2025-06-11

## 2025-03-13 NOTE — TELEPHONE ENCOUNTER
Requested Prescriptions     Pending Prescriptions Disp Refills    MOUNJARO 5 MG/0.5ML SOAJ [Pharmacy Med Name: MOUNJARO 5 MG/0.5 ML PEN]       Sig: INJECT 5 MG SUBCUTANEOUSLY WEEKLY

## 2025-04-13 DIAGNOSIS — E78.2 MIXED HYPERLIPIDEMIA: ICD-10-CM

## 2025-04-13 RX ORDER — ATORVASTATIN CALCIUM 20 MG/1
20 TABLET, FILM COATED ORAL DAILY
Qty: 90 TABLET | Refills: 1 | Status: SHIPPED | OUTPATIENT
Start: 2025-04-13

## 2025-04-20 ASSESSMENT — SLEEP AND FATIGUE QUESTIONNAIRES
HOW LIKELY ARE YOU TO NOD OFF OR FALL ASLEEP WHILE SITTING AND READING: SLIGHT CHANCE OF DOZING
HOW LIKELY ARE YOU TO NOD OFF OR FALL ASLEEP WHILE SITTING AND TALKING TO SOMEONE: WOULD NEVER DOZE
HOW LIKELY ARE YOU TO NOD OFF OR FALL ASLEEP WHILE WATCHING TV: SLIGHT CHANCE OF DOZING
DO YOU GENERALLY HAVE DIFFICULTY REMEMBERING THINGS BECAUSE YOU ARE SLEEPY OR TIRED: YES, MODERATE
DO YOU HAVE DIFFICULTY BEING AS ACTIVE AS YOU WANT TO BE IN THE MORNING BECAUSE YOU ARE SLEEPY OR TIRED: YES, MODERATE
HOW LIKELY ARE YOU TO NOD OFF OR FALL ASLEEP WHEN YOU ARE A PASSENGER IN A CAR FOR AN HOUR WITHOUT A BREAK: WOULD NEVER DOZE
DO YOU HAVE DIFFICULTY VISITING YOUR FAMILY OR FRIENDS IN THEIR HOME BECAUSE YOU BECOME SLEEPY OR TIRED: YES, A LITTLE
DO YOU HAVE DIFFICULTY OPERATING A MOTOR VEHICLE FOR SHORT DISTANCES (LESS THAN 100 MILES) BECAUSE YOU BECOME SLEEPY: NO
DO YOU HAVE DIFFICULTY OPERATING A MOTOR VEHICLE FOR LONG DISTANCES (GREATER THAN 100 MILES) BECAUSE YOU BECOME SLEEPY: YES, A LITTLE
HOW LIKELY ARE YOU TO NOD OFF OR FALL ASLEEP IN A CAR, WHILE STOPPED FOR A FEW MINUTES IN TRAFFIC: SLIGHT CHANCE OF DOZING
HOW LIKELY ARE YOU TO NOD OFF OR FALL ASLEEP WHILE WATCHING TV: SLIGHT CHANCE OF DOZING
ESS TOTAL SCORE: 8
DO YOU HAVE DIFFICULTY BEING AS ACTIVE AS YOU WANT TO BE IN THE EVENING BECAUSE YOU ARE SLEEPY OR TIRED: YES, MODERATE
HAS YOUR RELATIONSHIP WITH FAMILY, FRIENDS OR WORK COLLEAGUES BEEN AFFECTED BECAUSE YOU ARE SLEEPY OR TIRED: YES, A LITTLE
HOW LIKELY ARE YOU TO NOD OFF OR FALL ASLEEP IN A CAR, WHILE STOPPED FOR A FEW MINUTES IN TRAFFIC: SLIGHT CHANCE OF DOZING
FOSQ SCORE: 13
HOW LIKELY ARE YOU TO NOD OFF OR FALL ASLEEP WHILE SITTING INACTIVE IN A PUBLIC PLACE: SLIGHT CHANCE OF DOZING
HOW LIKELY ARE YOU TO NOD OFF OR FALL ASLEEP WHILE SITTING INACTIVE IN A PUBLIC PLACE: SLIGHT CHANCE OF DOZING
HOW LIKELY ARE YOU TO NOD OFF OR FALL ASLEEP WHILE SITTING QUIETLY AFTER LUNCH WITHOUT ALCOHOL: SLIGHT CHANCE OF DOZING
HOW LIKELY ARE YOU TO NOD OFF OR FALL ASLEEP WHILE LYING DOWN TO REST IN THE AFTERNOON WHEN CIRCUMSTANCES PERMIT: HIGH CHANCE OF DOZING
DO YOU HAVE DIFFICULTY WATCHING A MOVIE OR VIDEO BECAUSE YOU BECOME SLEEPY OR TIRED: YES, A LITTLE
HOW LIKELY ARE YOU TO NOD OFF OR FALL ASLEEP WHILE SITTING AND READING: SLIGHT CHANCE OF DOZING
HOW LIKELY ARE YOU TO NOD OFF OR FALL ASLEEP WHILE SITTING QUIETLY AFTER LUNCH WITHOUT ALCOHOL: SLIGHT CHANCE OF DOZING
DO YOU HAVE DIFFICULTY CONCENTRATING ON THE THINGS YOU DO BECAUSE YOU ARE SLEEPY OR TIRED: YES, MODERATE
HOW LIKELY ARE YOU TO NOD OFF OR FALL ASLEEP WHILE SITTING AND TALKING TO SOMEONE: WOULD NEVER DOZE
HAS YOUR MOOD BEEN AFFECTED BECAUSE YOU ARE SLEEPY OR TIRED: YES, MODERATE
HOW LIKELY ARE YOU TO NOD OFF OR FALL ASLEEP WHILE LYING DOWN TO REST IN THE AFTERNOON WHEN CIRCUMSTANCES PERMIT: HIGH CHANCE OF DOZING
HOW LIKELY ARE YOU TO NOD OFF OR FALL ASLEEP WHEN YOU ARE A PASSENGER IN A CAR FOR AN HOUR WITHOUT A BREAK: WOULD NEVER DOZE

## 2025-04-21 ENCOUNTER — OFFICE VISIT (OUTPATIENT)
Dept: PRIMARY CARE CLINIC | Facility: CLINIC | Age: 45
End: 2025-04-21
Payer: COMMERCIAL

## 2025-04-21 VITALS
TEMPERATURE: 97 F | BODY MASS INDEX: 49.44 KG/M2 | HEART RATE: 84 BPM | SYSTOLIC BLOOD PRESSURE: 128 MMHG | WEIGHT: 315 LBS | HEIGHT: 67 IN | RESPIRATION RATE: 20 BRPM | OXYGEN SATURATION: 99 % | DIASTOLIC BLOOD PRESSURE: 83 MMHG

## 2025-04-21 DIAGNOSIS — E11.59 HYPERTENSION ASSOCIATED WITH DIABETES (HCC): ICD-10-CM

## 2025-04-21 DIAGNOSIS — E55.9 VITAMIN D DEFICIENCY: ICD-10-CM

## 2025-04-21 DIAGNOSIS — E11.8 DIABETES MELLITUS WITH COMPLICATION (HCC): ICD-10-CM

## 2025-04-21 DIAGNOSIS — J02.9 SORE THROAT: Primary | ICD-10-CM

## 2025-04-21 DIAGNOSIS — I15.2 HYPERTENSION ASSOCIATED WITH DIABETES (HCC): ICD-10-CM

## 2025-04-21 DIAGNOSIS — G47.33 OSA TREATED WITH BIPAP: ICD-10-CM

## 2025-04-21 DIAGNOSIS — E66.01 MORBIDLY OBESE (HCC): ICD-10-CM

## 2025-04-21 PROCEDURE — 99214 OFFICE O/P EST MOD 30 MIN: CPT | Performed by: INTERNAL MEDICINE

## 2025-04-21 PROCEDURE — 3051F HG A1C>EQUAL 7.0%<8.0%: CPT | Performed by: INTERNAL MEDICINE

## 2025-04-21 PROCEDURE — 3079F DIAST BP 80-89 MM HG: CPT | Performed by: INTERNAL MEDICINE

## 2025-04-21 PROCEDURE — 3074F SYST BP LT 130 MM HG: CPT | Performed by: INTERNAL MEDICINE

## 2025-04-21 ASSESSMENT — ENCOUNTER SYMPTOMS
ABDOMINAL PAIN: 0
EYE DISCHARGE: 0
BACK PAIN: 0
SHORTNESS OF BREATH: 0
CONSTIPATION: 0
COLOR CHANGE: 0
DIARRHEA: 0
RHINORRHEA: 0
CHEST TIGHTNESS: 0
SORE THROAT: 0
COUGH: 0

## 2025-04-21 ASSESSMENT — PATIENT HEALTH QUESTIONNAIRE - PHQ9
1. LITTLE INTEREST OR PLEASURE IN DOING THINGS: NOT AT ALL
SUM OF ALL RESPONSES TO PHQ QUESTIONS 1-9: 0
SUM OF ALL RESPONSES TO PHQ QUESTIONS 1-9: 0
2. FEELING DOWN, DEPRESSED OR HOPELESS: NOT AT ALL
SUM OF ALL RESPONSES TO PHQ QUESTIONS 1-9: 0
SUM OF ALL RESPONSES TO PHQ QUESTIONS 1-9: 0

## 2025-04-21 NOTE — PROGRESS NOTES
Dimitri Cabrales (:  1980) is a 45 y.o. male, Established patient, here for evaluation of the following chief complaint(s):  Pharyngitis (Started this past Saturday. /Has been taking theraflu ) and Follow-up (On diabetes. )      ASSESSMENT/PLAN:  1. Sore throat  -     AMB POC RAPID STREP A  POC strep A test administered prior to visit today.  2. Diabetes mellitus with complication (HCC)  -     Tirzepatide 10 MG/0.5ML SOAJ; Inject 10 mg into the skin every 7 days, Disp-2 mL, R-1Normal sent to pharmacy.  -     Hemoglobin A1C; Future  -     Comprehensive Metabolic Panel; Future  -     Albumin/Creatinine Ratio, Urine; Future  -     Lipid Panel; Future  I increased Mounjaro from 5 mg to 10 mg to continue injecting weekly.  I ordered blood work to measure Hgb A1C levels.   I ordered a urinalysis to measure albumin/ creatinine ratio.   I ordered a CMP.  I ordered a lipid panel.  Lipid panel from 2025 reviewed.   3. Morbidly obese  I recommend the patient exercise regularly, monitor his diet, and stay well-hydrated.  I explained that 5,000 steps are needed daily for healthy lifestyle and to maintain conditioning, and he will need to increase to 10,000 a day to work on weight loss.   4. Hypertension associated with diabetes  I recommend that he continue taking Bystolic 5 mg daily and Hyzaar 50-12.5 mg daily.   5. Vitamin D deficiency  -     Vitamin D 25 Hydroxy; Future  Ordered lab work to check Vitamin D levels. Waiting for results. Recommend that patient take a Vitamin D supplement of 1,000 units daily.  6. KEVIN treated with BiPAP  He is followed by sleep medicine.  I recommend he continue using his BIPAP.             Subjective   SUBJECTIVE/OBJECTIVE:  Pharyngitis  Pertinent negatives include no abdominal pain, arthralgias, congestion, coughing, fatigue, headaches, myalgias, rash or sore throat.     Patient presents today for follow-up on chronic conditions. He is fasting today for labs.     He also presented

## 2025-04-21 NOTE — PROGRESS NOTES
\"Have you been to the ER, urgent care clinic since your last visit?  Hospitalized since your last visit?\"    NO      “Have you seen or consulted any other health care providers outside our system since your last visit?”    NO      Chief Complaint   Patient presents with    Pharyngitis     Started this past Saturday.   Has been taking theraflu     Follow-up     On diabetes.        Pt is ok with scribe.     I told pt Dr. Sabillon wants me to test him for strep and he said, is that in my throat? I said yes, I swab both sides of your throat. He said I have a gag reflex. I said that's ok.   When I came back with the test, I asked pt to stick his tongue out and he did. I then asked that he tilt his head back and he did but then closed his mouth and looked at me and said,\"what?\" I said stick your tongue out and say ahh. He did and I went to put the swab in his mouth and I asked that he put his head back again and he said, \"I can't\" and put his hand up and closed his mouth. I said ok and threw the test and gloves away.

## 2025-04-22 ENCOUNTER — HOSPITAL ENCOUNTER (OUTPATIENT)
Facility: HOSPITAL | Age: 45
Discharge: HOME | End: 2025-04-25

## 2025-04-22 ENCOUNTER — OFFICE VISIT (OUTPATIENT)
Age: 45
End: 2025-04-22
Payer: COMMERCIAL

## 2025-04-22 VITALS
OXYGEN SATURATION: 96 % | BODY MASS INDEX: 49.44 KG/M2 | TEMPERATURE: 97.6 F | HEART RATE: 93 BPM | SYSTOLIC BLOOD PRESSURE: 138 MMHG | DIASTOLIC BLOOD PRESSURE: 82 MMHG | WEIGHT: 315 LBS | HEIGHT: 67 IN

## 2025-04-22 DIAGNOSIS — I15.2 HYPERTENSION ASSOCIATED WITH DIABETES (HCC): ICD-10-CM

## 2025-04-22 DIAGNOSIS — E11.59 HYPERTENSION ASSOCIATED WITH DIABETES (HCC): ICD-10-CM

## 2025-04-22 DIAGNOSIS — G47.30 HYPERSOMNIA WITH SLEEP APNEA: Primary | ICD-10-CM

## 2025-04-22 DIAGNOSIS — Z79.899 MEDICATION MANAGEMENT: ICD-10-CM

## 2025-04-22 DIAGNOSIS — G47.10 HYPERSOMNIA WITH SLEEP APNEA: Primary | ICD-10-CM

## 2025-04-22 PROCEDURE — 3079F DIAST BP 80-89 MM HG: CPT | Performed by: INTERNAL MEDICINE

## 2025-04-22 PROCEDURE — 3051F HG A1C>EQUAL 7.0%<8.0%: CPT | Performed by: INTERNAL MEDICINE

## 2025-04-22 PROCEDURE — 3075F SYST BP GE 130 - 139MM HG: CPT | Performed by: INTERNAL MEDICINE

## 2025-04-22 PROCEDURE — 99214 OFFICE O/P EST MOD 30 MIN: CPT | Performed by: INTERNAL MEDICINE

## 2025-04-22 RX ORDER — CLINDAMYCIN PHOSPHATE 10 UG/ML
LOTION TOPICAL AS NEEDED
COMMUNITY

## 2025-04-22 RX ORDER — DOXYCYCLINE 100 MG/1
TABLET ORAL
COMMUNITY
Start: 2025-04-04

## 2025-04-22 RX ORDER — ARMODAFINIL 150 MG/1
150 TABLET ORAL DAILY
Qty: 90 TABLET | Refills: 1 | Status: SHIPPED | OUTPATIENT
Start: 2025-04-22 | End: 2025-10-19

## 2025-04-22 NOTE — PATIENT INSTRUCTIONS
[address]  [place] [address]  Tel.  [phone]  Fax. [phone] [address]  [place] [address]  Tel.  [phone]  Fax. [phone] [address] [place] [address]  Tel.  [phone]  Fax. [phone]     Learning About CPAP for Sleep Apnea  What is CPAP?              CPAP is a small machine that you use at home every night while you sleep. It increases air pressure in your throat to keep your airway open. When you have sleep apnea, this can help you sleep better so you feel much better. CPAP stands for \"continuous positive airway pressure.\"  The CPAP machine will have one of the following:  A mask that covers your nose and mouth  Prongs that fit into your nose  A mask that covers your nose only, the most common type. This type is called NCPAP. The N stands for \"nasal.\"  Why is it done?  CPAP is usually the best treatment for obstructive sleep apnea. It is the first treatment choice and the most widely used. Your doctor may suggest CPAP if you have:  Moderate to severe sleep apnea.  Sleep apnea and coronary artery disease (CAD) or heart failure.  How does it help?  CPAP can help you have more normal sleep, so you feel less sleepy and more alert during the daytime.  CPAP may help keep heart failure or other heart problems from getting worse.  NCPAP may help lower your blood pressure.  If you use CPAP, your bed partner may also sleep better because you are not snoring or restless.  What are the side effects?  Some people who use CPAP have:  A dry or stuffy nose and a sore throat.  Irritated skin on the face.  Sore eyes.  Bloating.  If you have any of these problems, work with your doctor to fix them. Here are some things you can try:  Be sure the mask or nasal prongs fit well.  See if your doctor can adjust the pressure of your CPAP.  If your nose is dry, try a humidifier.  If your nose is runny or stuffy, try decongestant medicine or a steroid

## 2025-04-22 NOTE — PROGRESS NOTES
[address] [address]  Tel.  [phone]    Fax. [phone]     [address], [place] [address]  Tel.  [phone]    Fax. [phone] [address] [place] [address]  Tel.  [phone]    Fax. [phone]       Dimitri Cabrales (: 1980) is a 45 y.o. male, established patient, seen for positive airway pressure follow-up and evaluation of the following chief complaint(s):   Sleep Problem (6 month follow up )       Dimitri Cabrales was last seen by me on 10-21-24, prior notes reviewed in detail.  Home sleep test 2017 showed AHI of 37.5/hr with a lowest SpO2 of 72%, duration of SpO2 < 88% 55.4 min. Weight at time of sleep testing 295 pounds.     Subsequent BiPAP titration 2018 after continued daytime sleepiness with APAP showed adequate treatment at a pressure of 14/5 cmH2O.  Weight at time of sleep testing 306 pounds.     Negative MSLT 2019 showed Avg sleep latency of 16:12 minutes and no sleep onset REM periods.     ASSESSMENT/PLAN:   Diagnosis Orders   1. KEVIN (obstructive sleep apnea)        2. Hypertension associated with diabetes        3. Medication management        4. BMI 50.0-59.9, adult            On ResMed:  AirCurve 11 VAuto:    Settings:  Mode - VAuto    Max IPAP: 25 cmH2O    Min EPAP: 15 cmH2O    PS: 6 cmH2O    Sleep Apnea -   * He is adherent with PAP therapy and PAP continues to benefit patient and remains necessary for control of his sleep apnea. Continue on current pressures.  Detailed PAP download was reviewed with patient for 7 nights.  Nocturnal awakenings as implied by changes in respiratory rate and tidal volume occurred on 3 nights of which only 1 night there was a significant prolonged awakening.  Stimulus control therapy as a means of decreasing the frequency and duration of awakenings discussed with patient.  Central apneas were noted on detailed downloads these appear to be of no significant

## 2025-04-23 LAB
25(OH)D3+25(OH)D2 SERPL-MCNC: 8.2 NG/ML (ref 30–100)
ALBUMIN SERPL-MCNC: 4.2 G/DL (ref 4.1–5.1)
ALP SERPL-CCNC: 126 IU/L (ref 44–121)
ALT SERPL-CCNC: 24 IU/L (ref 0–44)
AST SERPL-CCNC: 22 IU/L (ref 0–40)
BILIRUB SERPL-MCNC: 0.4 MG/DL (ref 0–1.2)
BUN SERPL-MCNC: 9 MG/DL (ref 6–24)
BUN/CREAT SERPL: 8 (ref 9–20)
CALCIUM SERPL-MCNC: 9 MG/DL (ref 8.7–10.2)
CHLORIDE SERPL-SCNC: 103 MMOL/L (ref 96–106)
CHOLEST SERPL-MCNC: 215 MG/DL (ref 100–199)
CO2 SERPL-SCNC: 23 MMOL/L (ref 20–29)
CREAT SERPL-MCNC: 1.15 MG/DL (ref 0.76–1.27)
EGFRCR SERPLBLD CKD-EPI 2021: 80 ML/MIN/1.73
GLOBULIN SER CALC-MCNC: 3 G/DL (ref 1.5–4.5)
GLUCOSE SERPL-MCNC: 138 MG/DL (ref 70–99)
HBA1C MFR BLD: 6.5 % (ref 4.8–5.6)
HDLC SERPL-MCNC: 35 MG/DL
LDLC SERPL CALC-MCNC: 159 MG/DL (ref 0–99)
POTASSIUM SERPL-SCNC: 4.4 MMOL/L (ref 3.5–5.2)
PROT SERPL-MCNC: 7.2 G/DL (ref 6–8.5)
SODIUM SERPL-SCNC: 145 MMOL/L (ref 134–144)
TRIGL SERPL-MCNC: 114 MG/DL (ref 0–149)
VLDLC SERPL CALC-MCNC: 21 MG/DL (ref 5–40)

## 2025-04-24 ENCOUNTER — TELEMEDICINE (OUTPATIENT)
Age: 45
End: 2025-04-24
Payer: COMMERCIAL

## 2025-04-24 DIAGNOSIS — E11.65 TYPE 2 DIABETES MELLITUS WITH HYPERGLYCEMIA, WITHOUT LONG-TERM CURRENT USE OF INSULIN (HCC): Primary | ICD-10-CM

## 2025-04-24 DIAGNOSIS — E66.01 OBESITY, MORBID (HCC): ICD-10-CM

## 2025-04-24 PROCEDURE — G0108 DIAB MANAGE TRN  PER INDIV: HCPCS | Performed by: DIETITIAN, REGISTERED

## 2025-04-25 LAB
AMPHETAMINES UR QL SCN: NEGATIVE NG/ML
BARBITURATES UR QL SCN: NEGATIVE NG/ML
BENZODIAZ UR QL SCN: NEGATIVE NG/ML
BZE UR QL SCN: NEGATIVE NG/ML
CANNABINOIDS UR QL SCN: NEGATIVE NG/ML
OPIATES UR QL SCN: NEGATIVE NG/ML
PCP UR QL SCN: NEGATIVE NG/ML
SERVICE CMNT-IMP: NORMAL

## 2025-04-26 ENCOUNTER — RESULTS FOLLOW-UP (OUTPATIENT)
Dept: PRIMARY CARE CLINIC | Facility: CLINIC | Age: 45
End: 2025-04-26

## 2025-04-26 DIAGNOSIS — E55.9 VITAMIN D DEFICIENCY: Primary | ICD-10-CM

## 2025-04-26 RX ORDER — MULTIVIT-MIN/IRON/FOLIC ACID/K 18-600-40
2000 CAPSULE ORAL DAILY
Qty: 90 CAPSULE | Refills: 0 | Status: SHIPPED | OUTPATIENT
Start: 2025-04-26 | End: 2025-07-25

## 2025-04-29 NOTE — PROGRESS NOTES
[facility]  Diabetes Self-Management Education & Support Program  Encounter Note      SUMMARY  Diabetes self-care management training was completed related to physical activity. The participant will return on June 12 to continue DSMES related to problem solving - DM and weight loss.      EVALUATION:  Dimitri shared that he has relapsed with his weight loss. He shared that he has noticed a change in the benefits of Mounjaro - reports had increase in dose prescribed. He attributes changes in work and health balance along with completed projects has led to decreased activity and more sitting.     RECOMMENDATIONS:  1) move forward with current goal for activity.  2) consider adding 3 episodes of 5 minutes movement am and pm      TOPICS DISCUSSED TODAY:  HOW DOES PHYSICAL ACTIVITY AFFECT MY DIABETES? 30      Next provider visit is not scheduled at this time.          DATE DSMES TOPIC EVALUATION     4/24/25 HOW DOES PHYSICAL ACTIVITY AFFECT MY DIABETES?   Benefits of physical activity   Beginning a program of physical activity   Walking   Pedometers   Goal setting   Structured physical activity program   Aerobic activity   Resistance   Flexibility   Balance   Physical activity program progression   Safety issues   Barriers to physical activity   Facilitators of physical activity      Dimitri has finished his home dilan project - resulting in more sitting in the evening instead of activity. Shared that he does not like to be around people and that this is a barrier at times to activity. We discussed using small amounts of time \"5 minutes\" (walking down the smith, walking in chair, doing resistance exercises etc.) to increase his movement.   SMART GOAL - continued  Build in resistance bands for 10 minutes twice a day and 5 minutes total warm up and cool down stretching          JB PRITCHETT RD, [facility] [place] on 4/29/2025 at 10:15 AM    I have personally reviewed the health record,

## 2025-05-18 DIAGNOSIS — I15.2 HYPERTENSION ASSOCIATED WITH DIABETES (HCC): ICD-10-CM

## 2025-05-18 DIAGNOSIS — E11.59 HYPERTENSION ASSOCIATED WITH DIABETES (HCC): ICD-10-CM

## 2025-05-19 DIAGNOSIS — E11.9 TYPE 2 DIABETES MELLITUS WITHOUT COMPLICATION, WITHOUT LONG-TERM CURRENT USE OF INSULIN (HCC): Primary | ICD-10-CM

## 2025-05-19 RX ORDER — LOSARTAN POTASSIUM AND HYDROCHLOROTHIAZIDE 12.5; 5 MG/1; MG/1
1 TABLET ORAL DAILY
Qty: 90 TABLET | Refills: 1 | Status: SHIPPED | OUTPATIENT
Start: 2025-05-19

## 2025-05-19 RX ORDER — NEBIVOLOL 5 MG/1
5 TABLET ORAL DAILY
Qty: 90 TABLET | Refills: 1 | Status: SHIPPED | OUTPATIENT
Start: 2025-05-19

## 2025-06-12 ENCOUNTER — TELEMEDICINE (OUTPATIENT)
Age: 45
End: 2025-06-12
Payer: COMMERCIAL

## 2025-06-12 DIAGNOSIS — E11.65 TYPE 2 DIABETES MELLITUS WITH HYPERGLYCEMIA, WITHOUT LONG-TERM CURRENT USE OF INSULIN (HCC): Primary | ICD-10-CM

## 2025-06-12 PROCEDURE — G0108 DIAB MANAGE TRN  PER INDIV: HCPCS | Performed by: DIETITIAN, REGISTERED

## 2025-06-12 NOTE — PROGRESS NOTES
HOW DOES PHYSICAL ACTIVITY AFFECT MY DIABETES?   Benefits of physical activity   Beginning a program of physical activity   Walking   Pedometers   Goal setting   Structured physical activity program   Aerobic activity   Resistance   Flexibility   Balance   Physical activity program progression   Safety issues   Barriers to physical activity   Facilitators of physical activity    Shared that during meetings - using resistance bands daily - 15 minutes.and another 15 minutes (6 episodes of 5 minutes)   Doing high interval training 20 x2 weeks.    Stress level is more manageable or sleeping better.          JB PRITCHETT RD, [facility] on 6/12/2025 at 3:51 PM    I have personally reviewed the health record, including provider notes, laboratory data and current medications before making these care and education recommendations. Total minutes: 0    Dimitri Libron, was evaluated through a synchronous (real-time) audio-video encounter. The patient (or guardian if applicable) is aware that this is a billable service, which includes applicable co-pays. This Virtual Visit was conducted with patient's (and/or legal guardian's) consent. Patient identification was verified, and a caregiver was present when appropriate.   The patient was located at Home: [address] [place] [id number]  Provider was located at Facility (Appt Dept): [address] [place] [address], [address]  [place] [address]  Confirm you are appropriately licensed, registered, or certified to deliver care in the state where the patient is located as indicated above. If you are not or unsure, please re-schedule the visit: Yes, I confirm. ;

## 2025-06-14 DIAGNOSIS — E11.9 TYPE 2 DIABETES MELLITUS WITHOUT COMPLICATION, WITHOUT LONG-TERM CURRENT USE OF INSULIN (HCC): ICD-10-CM

## 2025-06-14 DIAGNOSIS — E11.8 DIABETES MELLITUS WITH COMPLICATION (HCC): ICD-10-CM

## 2025-06-14 RX ORDER — BLOOD SUGAR DIAGNOSTIC
STRIP MISCELLANEOUS
Qty: 100 STRIP | Refills: 1 | Status: SHIPPED | OUTPATIENT
Start: 2025-06-14

## 2025-06-15 RX ORDER — TIRZEPATIDE 10 MG/.5ML
INJECTION, SOLUTION SUBCUTANEOUS
Qty: 3 ML | Refills: 1 | Status: SHIPPED | OUTPATIENT
Start: 2025-06-15

## 2025-07-23 DIAGNOSIS — E55.9 VITAMIN D DEFICIENCY: ICD-10-CM

## 2025-07-23 RX ORDER — ACETAMINOPHEN 160 MG
TABLET,DISINTEGRATING ORAL DAILY
Qty: 90 CAPSULE | Refills: 0 | Status: SHIPPED | OUTPATIENT
Start: 2025-07-23

## 2025-07-30 DIAGNOSIS — E11.8 DIABETES MELLITUS WITH COMPLICATION (HCC): ICD-10-CM

## 2025-07-30 RX ORDER — METFORMIN HYDROCHLORIDE 750 MG/1
750 TABLET, EXTENDED RELEASE ORAL
Qty: 90 TABLET | Refills: 1 | Status: SHIPPED | OUTPATIENT
Start: 2025-07-30

## 2025-08-04 ENCOUNTER — TELEMEDICINE (OUTPATIENT)
Age: 45
End: 2025-08-04
Payer: COMMERCIAL

## 2025-08-04 DIAGNOSIS — E11.65 TYPE 2 DIABETES MELLITUS WITH HYPERGLYCEMIA, WITHOUT LONG-TERM CURRENT USE OF INSULIN (HCC): Primary | ICD-10-CM

## 2025-08-04 PROCEDURE — G0108 DIAB MANAGE TRN  PER INDIV: HCPCS | Performed by: DIETITIAN, REGISTERED
